# Patient Record
Sex: FEMALE | Race: OTHER | NOT HISPANIC OR LATINO
[De-identification: names, ages, dates, MRNs, and addresses within clinical notes are randomized per-mention and may not be internally consistent; named-entity substitution may affect disease eponyms.]

---

## 2014-05-07 RX ORDER — PANTOPRAZOLE SODIUM 20 MG/1
1 TABLET, DELAYED RELEASE ORAL
Qty: 0 | Refills: 0 | COMMUNITY
Start: 2014-05-07

## 2017-01-09 ENCOUNTER — APPOINTMENT (OUTPATIENT)
Dept: GERIATRICS | Facility: CLINIC | Age: 82
End: 2017-01-09

## 2017-02-06 ENCOUNTER — TRANSCRIPTION ENCOUNTER (OUTPATIENT)
Age: 82
End: 2017-02-06

## 2017-02-10 ENCOUNTER — APPOINTMENT (OUTPATIENT)
Dept: GERIATRICS | Facility: CLINIC | Age: 82
End: 2017-02-10

## 2017-02-21 ENCOUNTER — TRANSCRIPTION ENCOUNTER (OUTPATIENT)
Age: 82
End: 2017-02-21

## 2017-03-13 ENCOUNTER — APPOINTMENT (OUTPATIENT)
Dept: GERIATRICS | Facility: CLINIC | Age: 82
End: 2017-03-13

## 2017-03-13 VITALS
BODY MASS INDEX: 28.91 KG/M2 | DIASTOLIC BLOOD PRESSURE: 60 MMHG | WEIGHT: 134 LBS | OXYGEN SATURATION: 97 % | RESPIRATION RATE: 15 BRPM | HEIGHT: 57 IN | SYSTOLIC BLOOD PRESSURE: 110 MMHG | TEMPERATURE: 97.4 F | HEART RATE: 92 BPM

## 2017-03-13 DIAGNOSIS — R05 COUGH: ICD-10-CM

## 2017-03-20 ENCOUNTER — TRANSCRIPTION ENCOUNTER (OUTPATIENT)
Age: 82
End: 2017-03-20

## 2017-04-04 ENCOUNTER — TRANSCRIPTION ENCOUNTER (OUTPATIENT)
Age: 82
End: 2017-04-04

## 2017-04-04 ENCOUNTER — RX RENEWAL (OUTPATIENT)
Age: 82
End: 2017-04-04

## 2017-05-04 ENCOUNTER — INPATIENT (INPATIENT)
Facility: HOSPITAL | Age: 82
LOS: 3 days | Discharge: HOME | DRG: 698 | End: 2017-05-08
Attending: INTERNAL MEDICINE | Admitting: HOSPITALIST
Payer: MEDICARE

## 2017-05-04 VITALS
OXYGEN SATURATION: 96 % | TEMPERATURE: 100 F | DIASTOLIC BLOOD PRESSURE: 101 MMHG | SYSTOLIC BLOOD PRESSURE: 177 MMHG | HEART RATE: 119 BPM | RESPIRATION RATE: 20 BRPM

## 2017-05-04 DIAGNOSIS — N39.0 URINARY TRACT INFECTION, SITE NOT SPECIFIED: ICD-10-CM

## 2017-05-04 LAB
ALBUMIN SERPL ELPH-MCNC: 3.6 G/DL — SIGNIFICANT CHANGE UP (ref 3.3–5)
ALP SERPL-CCNC: 78 U/L — SIGNIFICANT CHANGE UP (ref 40–120)
ALT FLD-CCNC: 15 U/L RC — SIGNIFICANT CHANGE UP (ref 10–45)
ANION GAP SERPL CALC-SCNC: 16 MMOL/L — SIGNIFICANT CHANGE UP (ref 5–17)
APPEARANCE UR: ABNORMAL
APTT BLD: 34 SEC — SIGNIFICANT CHANGE UP (ref 27.5–37.4)
AST SERPL-CCNC: 17 U/L — SIGNIFICANT CHANGE UP (ref 10–40)
BACTERIA # UR AUTO: ABNORMAL /HPF
BASOPHILS # BLD AUTO: 0.1 K/UL — SIGNIFICANT CHANGE UP (ref 0–0.2)
BASOPHILS NFR BLD AUTO: 0.5 % — SIGNIFICANT CHANGE UP (ref 0–2)
BILIRUB SERPL-MCNC: 0.2 MG/DL — SIGNIFICANT CHANGE UP (ref 0.2–1.2)
BILIRUB UR-MCNC: NEGATIVE — SIGNIFICANT CHANGE UP
BUN SERPL-MCNC: 44 MG/DL — HIGH (ref 7–23)
CALCIUM SERPL-MCNC: 11.2 MG/DL — HIGH (ref 8.4–10.5)
CHLORIDE SERPL-SCNC: 101 MMOL/L — SIGNIFICANT CHANGE UP (ref 96–108)
CO2 SERPL-SCNC: 22 MMOL/L — SIGNIFICANT CHANGE UP (ref 22–31)
COLOR SPEC: YELLOW — SIGNIFICANT CHANGE UP
COMMENT - URINE: SIGNIFICANT CHANGE UP
COMMENT - URINE: SIGNIFICANT CHANGE UP
CREAT SERPL-MCNC: 2.81 MG/DL — HIGH (ref 0.5–1.3)
DIFF PNL FLD: ABNORMAL
EOSINOPHIL # BLD AUTO: 0.2 K/UL — SIGNIFICANT CHANGE UP (ref 0–0.5)
EOSINOPHIL NFR BLD AUTO: 1.7 % — SIGNIFICANT CHANGE UP (ref 0–6)
GAS PNL BLDV: SIGNIFICANT CHANGE UP
GLUCOSE SERPL-MCNC: 108 MG/DL — HIGH (ref 70–99)
GLUCOSE UR QL: NEGATIVE — SIGNIFICANT CHANGE UP
HCT VFR BLD CALC: 32.6 % — LOW (ref 34.5–45)
HGB BLD-MCNC: 10.5 G/DL — LOW (ref 11.5–15.5)
INR BLD: 1.02 RATIO — SIGNIFICANT CHANGE UP (ref 0.88–1.16)
KETONES UR-MCNC: NEGATIVE — SIGNIFICANT CHANGE UP
LEUKOCYTE ESTERASE UR-ACNC: ABNORMAL
LYMPHOCYTES # BLD AUTO: 19.6 % — SIGNIFICANT CHANGE UP (ref 13–44)
LYMPHOCYTES # BLD AUTO: 2.3 K/UL — SIGNIFICANT CHANGE UP (ref 1–3.3)
MCHC RBC-ENTMCNC: 29.3 PG — SIGNIFICANT CHANGE UP (ref 27–34)
MCHC RBC-ENTMCNC: 32.3 GM/DL — SIGNIFICANT CHANGE UP (ref 32–36)
MCV RBC AUTO: 90.5 FL — SIGNIFICANT CHANGE UP (ref 80–100)
MONOCYTES # BLD AUTO: 1.1 K/UL — HIGH (ref 0–0.9)
MONOCYTES NFR BLD AUTO: 9.3 % — SIGNIFICANT CHANGE UP (ref 2–14)
NEUTROPHILS # BLD AUTO: 8 K/UL — HIGH (ref 1.8–7.4)
NEUTROPHILS NFR BLD AUTO: 69 % — SIGNIFICANT CHANGE UP (ref 43–77)
NITRITE UR-MCNC: NEGATIVE — SIGNIFICANT CHANGE UP
PH UR: 6.5 — SIGNIFICANT CHANGE UP (ref 5–8)
PLATELET # BLD AUTO: 298 K/UL — SIGNIFICANT CHANGE UP (ref 150–400)
POTASSIUM SERPL-MCNC: 4.7 MMOL/L — SIGNIFICANT CHANGE UP (ref 3.5–5.3)
POTASSIUM SERPL-SCNC: 4.7 MMOL/L — SIGNIFICANT CHANGE UP (ref 3.5–5.3)
PROT SERPL-MCNC: 8.7 G/DL — HIGH (ref 6–8.3)
PROT UR-MCNC: 30 MG/DL
PROTHROM AB SERPL-ACNC: 11.1 SEC — SIGNIFICANT CHANGE UP (ref 9.8–12.7)
RBC # BLD: 3.6 M/UL — LOW (ref 3.8–5.2)
RBC # FLD: 13.5 % — SIGNIFICANT CHANGE UP (ref 10.3–14.5)
RBC CASTS # UR COMP ASSIST: ABNORMAL /HPF (ref 0–2)
SODIUM SERPL-SCNC: 139 MMOL/L — SIGNIFICANT CHANGE UP (ref 135–145)
SP GR SPEC: 1.01 — LOW (ref 1.01–1.02)
UROBILINOGEN FLD QL: NEGATIVE — SIGNIFICANT CHANGE UP
WBC # BLD: 11.7 K/UL — HIGH (ref 3.8–10.5)
WBC # FLD AUTO: 11.7 K/UL — HIGH (ref 3.8–10.5)
WBC UR QL: >50 /HPF (ref 0–5)

## 2017-05-04 PROCEDURE — 99285 EMERGENCY DEPT VISIT HI MDM: CPT | Mod: GC

## 2017-05-04 PROCEDURE — 74176 CT ABD & PELVIS W/O CONTRAST: CPT | Mod: 26

## 2017-05-04 RX ORDER — ACETAMINOPHEN 500 MG
1000 TABLET ORAL ONCE
Qty: 0 | Refills: 0 | Status: COMPLETED | OUTPATIENT
Start: 2017-05-04 | End: 2017-05-04

## 2017-05-04 RX ORDER — SODIUM CHLORIDE 9 MG/ML
1000 INJECTION INTRAMUSCULAR; INTRAVENOUS; SUBCUTANEOUS ONCE
Qty: 0 | Refills: 0 | Status: COMPLETED | OUTPATIENT
Start: 2017-05-04 | End: 2017-05-04

## 2017-05-04 RX ORDER — CEFTRIAXONE 500 MG/1
1 INJECTION, POWDER, FOR SOLUTION INTRAMUSCULAR; INTRAVENOUS ONCE
Qty: 0 | Refills: 0 | Status: COMPLETED | OUTPATIENT
Start: 2017-05-04 | End: 2017-05-04

## 2017-05-04 RX ORDER — CEFTRIAXONE 500 MG/1
1 INJECTION, POWDER, FOR SOLUTION INTRAMUSCULAR; INTRAVENOUS EVERY 24 HOURS
Qty: 0 | Refills: 0 | Status: DISCONTINUED | OUTPATIENT
Start: 2017-05-05 | End: 2017-05-05

## 2017-05-04 RX ORDER — CEFTRIAXONE 500 MG/1
INJECTION, POWDER, FOR SOLUTION INTRAMUSCULAR; INTRAVENOUS
Qty: 0 | Refills: 0 | Status: DISCONTINUED | OUTPATIENT
Start: 2017-05-04 | End: 2017-05-05

## 2017-05-04 RX ADMIN — SODIUM CHLORIDE 1000 MILLILITER(S): 9 INJECTION INTRAMUSCULAR; INTRAVENOUS; SUBCUTANEOUS at 22:57

## 2017-05-04 RX ADMIN — CEFTRIAXONE 100 GRAM(S): 500 INJECTION, POWDER, FOR SOLUTION INTRAMUSCULAR; INTRAVENOUS at 18:59

## 2017-05-04 RX ADMIN — SODIUM CHLORIDE 1000 MILLILITER(S): 9 INJECTION INTRAMUSCULAR; INTRAVENOUS; SUBCUTANEOUS at 20:12

## 2017-05-04 RX ADMIN — Medication 400 MILLIGRAM(S): at 22:58

## 2017-05-04 RX ADMIN — SODIUM CHLORIDE 1000 MILLILITER(S): 9 INJECTION INTRAMUSCULAR; INTRAVENOUS; SUBCUTANEOUS at 17:20

## 2017-05-04 NOTE — ED PROVIDER NOTE - OBJECTIVE STATEMENT
Tequila Smith, DO: 82F with hx of Dementia, CKD w/ 1 functional kidney, neurogenic bladder requiring self-catheterization & HTN here for dysuria & urinary frequency w/ hematuria. Pt AAO x 3.  endorses pt w/ chills - pt denies. No nausea/vomiting, dizziness, back pain, no change in vision, neck stiffness, no sore throat, no chest pain, no SOB, no cough, no abdominal pain, no diarrhea, no joint pain, no rashes, no focal neurologic complaints, otherwise as HPI.  PMD: Dr. Karina Wilson (Clinic)  Uro: Dr. Romel Pinedo

## 2017-05-04 NOTE — ED ADULT NURSE NOTE - OBJECTIVE STATEMENT
pt via ems with urinary retention no urine since last night hx neurogenic bladder with self cath per pt and  pt tachycardic with oral temp 99.1 on arrival skin warm dry abd large unreducable abd hernia pt denies chest pain orsob  abd discomfort on plapation

## 2017-05-04 NOTE — ED PROVIDER NOTE - PHYSICAL EXAMINATION
Tequila Smith, DO: PE: CONSTITUTIONAL: Well appearing, well nourished, in no apparent distress. ENMT: Airway patent, nasal mucosa clear, mouth with normal mucosa. HEAD: NCAT EYES: PERRL, EOMI CARDIAC: RRR, no m/r/g, no pedal edema RESPIRATORY: CTA b/l, no adventitious sounds GI: Abdomen non-distended, abdominal hernia TTP, abd otherwise TTP, no CVAT MSK: Spine appears normal, range of motion is not limited, no muscle/joint tenderness NEURO: CNII-XII grossly intact, 5/5 strength, full sensation all extremities SKIN: No rash

## 2017-05-04 NOTE — ED PROVIDER NOTE - MEDICAL DECISION MAKING DETAILS
82 y F congenital solitary kidney, unknown baseline Cr, self cath x 4-5 years for neurogenic bladder c decraesed UOP and suprapubic pain/tenderness c fevers at home (subjective).  Tachycardic on intial exam.  T 99.7 PO.  Concern for complicated UTI likely requiring admission.  UA/UCx, fluid bolus, basic labs, vbg, CT A/P to eval for obstruction, abx after cx, reassess.  --BMM

## 2017-05-04 NOTE — ED ADULT NURSE REASSESSMENT NOTE - NS ED NURSE REASSESS COMMENT FT1
Report received from BARRIE Jones. Pt. A+OX3, sinus tachycardia on CM. New IV placed, IVF being administered as ordered. Chris draining clear, yellow urine. Comfort and safety maintained. Pending dispo.

## 2017-05-04 NOTE — ED PROVIDER NOTE - CARE PLAN
Principal Discharge DX:	UTI (urinary tract infection)  Secondary Diagnosis:	Neurogenic bladder disorder

## 2017-05-04 NOTE — ED ADULT NURSE REASSESSMENT NOTE - NS ED NURSE REASSESS COMMENT FT1
pt upon return from ct scan iv site left a/c soft swelling iv removed from area to be ressited for fluid s

## 2017-05-04 NOTE — ED PROVIDER NOTE - PROGRESS NOTE DETAILS
Tequila Smith DO: Hospitalist paged. Tequila Smith, DO: Son now at bedside. States 2/2 Dementia, mother has been washing & reusing self-catheters because she believes someone told her in hospital she could. Son has been throwing out reused catheters.

## 2017-05-05 ENCOUNTER — TRANSCRIPTION ENCOUNTER (OUTPATIENT)
Age: 82
End: 2017-05-05

## 2017-05-05 ENCOUNTER — APPOINTMENT (OUTPATIENT)
Dept: GERIATRICS | Facility: CLINIC | Age: 82
End: 2017-05-05

## 2017-05-05 DIAGNOSIS — Z29.9 ENCOUNTER FOR PROPHYLACTIC MEASURES, UNSPECIFIED: ICD-10-CM

## 2017-05-05 DIAGNOSIS — F03.90 UNSPECIFIED DEMENTIA, UNSPECIFIED SEVERITY, WITHOUT BEHAVIORAL DISTURBANCE, PSYCHOTIC DISTURBANCE, MOOD DISTURBANCE, AND ANXIETY: ICD-10-CM

## 2017-05-05 DIAGNOSIS — E83.52 HYPERCALCEMIA: ICD-10-CM

## 2017-05-05 DIAGNOSIS — N31.9 NEUROMUSCULAR DYSFUNCTION OF BLADDER, UNSPECIFIED: ICD-10-CM

## 2017-05-05 DIAGNOSIS — N18.9 CHRONIC KIDNEY DISEASE, UNSPECIFIED: ICD-10-CM

## 2017-05-05 DIAGNOSIS — N39.0 URINARY TRACT INFECTION, SITE NOT SPECIFIED: ICD-10-CM

## 2017-05-05 DIAGNOSIS — I10 ESSENTIAL (PRIMARY) HYPERTENSION: ICD-10-CM

## 2017-05-05 DIAGNOSIS — N17.9 ACUTE KIDNEY FAILURE, UNSPECIFIED: ICD-10-CM

## 2017-05-05 LAB
24R-OH-CALCIDIOL SERPL-MCNC: 64.4 NG/ML — SIGNIFICANT CHANGE UP (ref 30–100)
CALCIUM SERPL-MCNC: 8.9 MG/DL — SIGNIFICANT CHANGE UP (ref 8.4–10.5)
PTH-INTACT FLD-MCNC: 32 PG/ML — SIGNIFICANT CHANGE UP (ref 15–65)
VIT D25+D1,25 OH+D1,25 PNL SERPL-MCNC: 44.3 PG/ML — SIGNIFICANT CHANGE UP (ref 19.9–79.3)

## 2017-05-05 PROCEDURE — 93010 ELECTROCARDIOGRAM REPORT: CPT

## 2017-05-05 PROCEDURE — 99223 1ST HOSP IP/OBS HIGH 75: CPT | Mod: GC

## 2017-05-05 PROCEDURE — 99222 1ST HOSP IP/OBS MODERATE 55: CPT

## 2017-05-05 RX ORDER — SODIUM CHLORIDE 9 MG/ML
1000 INJECTION INTRAMUSCULAR; INTRAVENOUS; SUBCUTANEOUS
Qty: 0 | Refills: 0 | Status: DISCONTINUED | OUTPATIENT
Start: 2017-05-05 | End: 2017-05-06

## 2017-05-05 RX ORDER — CEFTRIAXONE 500 MG/1
1 INJECTION, POWDER, FOR SOLUTION INTRAMUSCULAR; INTRAVENOUS EVERY 24 HOURS
Qty: 0 | Refills: 0 | Status: DISCONTINUED | OUTPATIENT
Start: 2017-05-05 | End: 2017-05-07

## 2017-05-05 RX ORDER — PANTOPRAZOLE SODIUM 20 MG/1
40 TABLET, DELAYED RELEASE ORAL
Qty: 0 | Refills: 0 | Status: DISCONTINUED | OUTPATIENT
Start: 2017-05-05 | End: 2017-05-08

## 2017-05-05 RX ORDER — MEROPENEM 1 G/30ML
500 INJECTION INTRAVENOUS EVERY 12 HOURS
Qty: 0 | Refills: 0 | Status: DISCONTINUED | OUTPATIENT
Start: 2017-05-05 | End: 2017-05-05

## 2017-05-05 RX ORDER — MIRTAZAPINE 45 MG/1
15 TABLET, ORALLY DISINTEGRATING ORAL AT BEDTIME
Qty: 0 | Refills: 0 | Status: DISCONTINUED | OUTPATIENT
Start: 2017-05-05 | End: 2017-05-08

## 2017-05-05 RX ORDER — SIMETHICONE 80 MG/1
80 TABLET, CHEWABLE ORAL
Qty: 0 | Refills: 0 | Status: DISCONTINUED | OUTPATIENT
Start: 2017-05-05 | End: 2017-05-08

## 2017-05-05 RX ORDER — METOPROLOL TARTRATE 50 MG
50 TABLET ORAL
Qty: 0 | Refills: 0 | Status: DISCONTINUED | OUTPATIENT
Start: 2017-05-05 | End: 2017-05-08

## 2017-05-05 RX ORDER — SIMVASTATIN 20 MG/1
20 TABLET, FILM COATED ORAL AT BEDTIME
Qty: 0 | Refills: 0 | Status: DISCONTINUED | OUTPATIENT
Start: 2017-05-05 | End: 2017-05-08

## 2017-05-05 RX ORDER — HEPARIN SODIUM 5000 [USP'U]/ML
5000 INJECTION INTRAVENOUS; SUBCUTANEOUS EVERY 12 HOURS
Qty: 0 | Refills: 0 | Status: DISCONTINUED | OUTPATIENT
Start: 2017-05-05 | End: 2017-05-05

## 2017-05-05 RX ORDER — FERROUS SULFATE 325(65) MG
325 TABLET ORAL DAILY
Qty: 0 | Refills: 0 | Status: DISCONTINUED | OUTPATIENT
Start: 2017-05-05 | End: 2017-05-08

## 2017-05-05 RX ORDER — SODIUM CHLORIDE 9 MG/ML
1000 INJECTION INTRAMUSCULAR; INTRAVENOUS; SUBCUTANEOUS
Qty: 0 | Refills: 0 | Status: DISCONTINUED | OUTPATIENT
Start: 2017-05-05 | End: 2017-05-05

## 2017-05-05 RX ORDER — HEPARIN SODIUM 5000 [USP'U]/ML
5000 INJECTION INTRAVENOUS; SUBCUTANEOUS EVERY 8 HOURS
Qty: 0 | Refills: 0 | Status: DISCONTINUED | OUTPATIENT
Start: 2017-05-05 | End: 2017-05-08

## 2017-05-05 RX ADMIN — SODIUM CHLORIDE 100 MILLILITER(S): 9 INJECTION INTRAMUSCULAR; INTRAVENOUS; SUBCUTANEOUS at 02:01

## 2017-05-05 RX ADMIN — SODIUM CHLORIDE 60 MILLILITER(S): 9 INJECTION INTRAMUSCULAR; INTRAVENOUS; SUBCUTANEOUS at 22:34

## 2017-05-05 RX ADMIN — SIMVASTATIN 20 MILLIGRAM(S): 20 TABLET, FILM COATED ORAL at 22:36

## 2017-05-05 RX ADMIN — Medication 1000 MILLIGRAM(S): at 00:52

## 2017-05-05 RX ADMIN — MIRTAZAPINE 15 MILLIGRAM(S): 45 TABLET, ORALLY DISINTEGRATING ORAL at 22:35

## 2017-05-05 RX ADMIN — Medication 325 MILLIGRAM(S): at 11:54

## 2017-05-05 RX ADMIN — HEPARIN SODIUM 5000 UNIT(S): 5000 INJECTION INTRAVENOUS; SUBCUTANEOUS at 06:47

## 2017-05-05 RX ADMIN — CEFTRIAXONE 100 GRAM(S): 500 INJECTION, POWDER, FOR SOLUTION INTRAMUSCULAR; INTRAVENOUS at 10:39

## 2017-05-05 RX ADMIN — Medication 50 MILLIGRAM(S): at 06:43

## 2017-05-05 RX ADMIN — SODIUM CHLORIDE 100 MILLILITER(S): 9 INJECTION INTRAMUSCULAR; INTRAVENOUS; SUBCUTANEOUS at 10:55

## 2017-05-05 RX ADMIN — SODIUM CHLORIDE 60 MILLILITER(S): 9 INJECTION INTRAMUSCULAR; INTRAVENOUS; SUBCUTANEOUS at 16:09

## 2017-05-05 RX ADMIN — Medication 50 MILLIGRAM(S): at 17:20

## 2017-05-05 RX ADMIN — HEPARIN SODIUM 5000 UNIT(S): 5000 INJECTION INTRAVENOUS; SUBCUTANEOUS at 22:35

## 2017-05-05 RX ADMIN — PANTOPRAZOLE SODIUM 40 MILLIGRAM(S): 20 TABLET, DELAYED RELEASE ORAL at 06:43

## 2017-05-05 NOTE — DISCHARGE NOTE ADULT - HOME CARE AGENCY
A nurse will visit you in your home from [facility].  They will contact you to confirm their visit.  Call [phone] or [phone] with any questions.

## 2017-05-05 NOTE — H&P ADULT. - PROBLEM SELECTOR PLAN 1
has increased urinary freq as symptom with tachycardia, leukocytosis  has h/o ESBL ecoli on chart but no documented cultures  will treat with meropenem for now and de-escalate pending Cx results  start  cc/hr, no signs of volume overload

## 2017-05-05 NOTE — H&P ADULT. - PROBLEM SELECTOR PLAN 3
slightly elevated  cont toprol xl 50 bid slightly elevated  cont Toprol xl 50 bid may have NELL on top of CKD as baseline 1.8-2  trend, avoid nephrotoxins, monitor UOP  IV Fluids

## 2017-05-05 NOTE — DISCHARGE NOTE ADULT - ADDITIONAL INSTRUCTIONS
Make appointments to follow up with your out patient physicians.  Bring all discharge paperwork including discharge medication list to your follow up appointments. Make appointments to follow up with your out patient physicians.  Bring all discharge paperwork including discharge medication list to your follow up appointments.  Follow-up with ID clinic in 2-3 weeks --call for appointment [phone]

## 2017-05-05 NOTE — DISCHARGE NOTE ADULT - PLAN OF CARE
Remain without infection HOME CARE INSTRUCTIONS  If you were prescribed antibiotics, take them exactly as your caregiver instructs you. Finish the medication even if you feel better after you have only taken some of the medication.  Drink enough water and fluids to keep your urine clear or pale yellow.  Avoid caffeine, tea, and carbonated beverages. They tend to irritate your bladder.  Empty your bladder often. Avoid holding urine for long periods of time.  Empty your bladder before and after sexual intercourse.  After a bowel movement, women should cleanse from front to back. Use each tissue only once.  SEEK MEDICAL CARE IF:  You have back pain.  You develop a fever.  Your symptoms do not begin to resolve within 3 days.  SEEK IMMEDIATE MEDICAL CARE IF:  You have severe back pain or lower abdominal pain.  You develop chills.  You have nausea or vomiting.  You have continued burning or discomfort with urination. Continue current medications Low salt diet  Activity as tolerated.  Take all medication as prescribed.  Follow up with your medical doctor for routine blood pressure monitoring at your next visit.  Notify your doctor if you have any of the following symptoms:   Dizziness, Lightheadedness, Blurry vision, Headache, Chest pain, Shortness of breath Avoid taking (NSAIDs) - (ex: Ibuprofen, Advil, Celebrex, Naprosyn)  Avoid taking any nephrotoxic agents (can harm kidneys) - Intravenous contrast for diagnostic testing, combination cold medications.  Have all medications adjusted for your renal function by your Health Care Provider.  Blood pressure control is important.  Take all medication as prescribed. chronic knee infection--continue antibiotic as prescribed follow-up with your PCP for Orthopedic consult/follow-up d/c with Chris catheter Continue current medications  home care arranged follow-up with your PCP for Orthopedic consult/follow-up  wound care right knee: dry dressing daily/prn

## 2017-05-05 NOTE — DISCHARGE NOTE ADULT - PATIENT PORTAL LINK FT
“You can access the [employer] Patient Portal, offered by [facility], by registering with the following website: [email or web address]”

## 2017-05-05 NOTE — H&P ADULT. - PROBLEM SELECTOR PLAN 4
near baseline  may have NELL on top of CKD  trend, avoid nephrotoxins, monitor UOP  likely improve with fluids IV Fluids as above  check PTH, vit d  trend Ca

## 2017-05-05 NOTE — DISCHARGE NOTE ADULT - PROVIDER TOKENS
TOKEN:'[id number]',TOKEN:'[id number]' TOKEN:'[id number]'

## 2017-05-05 NOTE — DISCHARGE NOTE ADULT - SECONDARY DIAGNOSIS.
Neurogenic bladder disorder HTN (hypertension) Acute on chronic kidney failure Effusion of right knee

## 2017-05-05 NOTE — H&P ADULT. - ATTENDING COMMENTS
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 Patient states that in the past 2-3 days she has felt more tired and felt that she has urinated more frequently than her baseline line. Denies abdominal pain, denies dysuria, flank pain. Patient states that when she self caths, but denies reusing her catheters. Per ED charting, son was concerned that pt was reusing her catherters. Pt able to provide much of the history, but with underlying dementia, accuracy of HPI in question.  Of note, Patient defers use of  phone. Team called at provided contact info yet unable to make contact with family.  Labs, imaging reviewed. EKG ordered and pending.   UTI (urinary tract infection) concern for sepsis as patient with leukocytosis and tachycardia on exam. No appreciable hypotension. -Maintenance IV fluid -?h/o ESBL E.coli on chart yet no documented cultures, treat with meropenem for now F/U speciation and sensitivities of cultures. continue with roque. Primary day team to follow up with family in AM with further collateral information.  Remainder of plan as noted above.  Primary day team to assume care in AM

## 2017-05-05 NOTE — H&P ADULT. - RS GEN PE MLT RESP DETAILS PC
respirations non-labored/no intercostal retractions/breath sounds equal/no rhonchi/airway patent/no chest wall tenderness/good air movement/no wheezes/no rales/clear to auscultation bilaterally

## 2017-05-05 NOTE — DISCHARGE NOTE ADULT - CARE PROVIDERS DIRECT ADDRESSES
,[email or web address]_Unknown ,[email or web address],[email or web address]_Unknown,[email or web address]

## 2017-05-05 NOTE — DISCHARGE NOTE ADULT - MEDICATION SUMMARY - MEDICATIONS TO TAKE
I will START or STAY ON the medications listed below when I get home from the hospital:    Remeron 15 mg oral tablet  -- 1 tab(s) by mouth once a day (at bedtime)  -- Indication: For Dementia    simvastatin 20 mg oral tablet  -- 1 tab(s) by mouth once a day (at bedtime)  -- Indication: For HLD    metoprolol succinate 50 mg oral tablet, extended release  -- 1 tab(s) by mouth 2 times a day  -- Indication: For HTN (hypertension)    ferrous sulfate  -- 325 milligram(s) by mouth once a day  -- Indication: For supplement    simethicone 80 mg oral tablet, chewable  -- 1 tab(s) by mouth 4 times a day, As needed, bloating  -- Indication: For Dyspepsia    amoxicillin-clavulanate 500 mg-125 mg oral tablet  -- 1 tab(s) by mouth 2 times a day  -- Indication: For Urinary tract infection    pantoprazole 40 mg oral delayed release tablet  -- 1 tab(s) by mouth 2 times a day (before meals)  -- Indication: For Dyspepsia I will START or STAY ON the medications listed below when I get home from the hospital:    Remeron 15 mg oral tablet  -- 1 tab(s) by mouth once a day (at bedtime)  -- Indication: For Dementia    simvastatin 20 mg oral tablet  -- 1 tab(s) by mouth once a day (at bedtime)  -- Indication: For HLD    metoprolol succinate 50 mg oral tablet, extended release  -- 1 tab(s) by mouth 2 times a day  -- Indication: For HTN (hypertension)    Keflex 500 mg oral capsule  -- 1 cap(s) by mouth every 8 hours  -- Finish all this medication unless otherwise directed by prescriber.    -- Indication: For knee infection     ferrous sulfate  -- 325 milligram(s) by mouth once a day  -- Indication: For supplement    simethicone 80 mg oral tablet, chewable  -- 1 tab(s) by mouth 4 times a day, As needed, bloating  -- Indication: For Dyspepsia    pantoprazole 40 mg oral delayed release tablet  -- 1 tab(s) by mouth 2 times a day (before meals)  -- Indication: For Dyspepsia

## 2017-05-05 NOTE — H&P ADULT. - LAB RESULTS AND INTERPRETATION
Labs reivewed by me  UA positive, Cr 2.81, ca 11.2, wbc 11.7, rbc 10.5 Labs reviewed by me  UA positive, Cr 2.81, ca 11.2, wbc 11.7 (leukocytosis), rbc 10.5

## 2017-05-05 NOTE — DISCHARGE NOTE ADULT - CARE PROVIDER_API CALL
Karina Wilson), Geriatric Medicine; Internal Medicine  [address] [place] [address]  Phone: [phone]  Fax: [phone]    Romel Schilling (), Nephrology  [address] [place] [address]  Phone: [phone]  Fax: [phone] Karina Wilson), Geriatric Medicine; Internal Medicine  [address] [place] [address]  Phone: [phone]  Fax: [phone]    Romel Schilling (), Nephrology  [address] [address]  Phone: [phone]  Fax: [phone]    Matheus Woo), Infectious Disease; Internal Medicine  [address] [place] [address]  Phone: [phone]  Fax: [phone]

## 2017-05-05 NOTE — H&P ADULT. - NEGATIVE GENERAL GENITOURINARY SYMPTOMS
no flank pain L/no flank pain R/no renal colic no flank pain R/no renal colic/no flank pain L/no dysuria

## 2017-05-05 NOTE — DISCHARGE NOTE ADULT - CARE PLAN
Principal Discharge DX:	UTI (urinary tract infection)  Goal:	Remain without infection  Instructions for follow-up, activity and diet:	HOME CARE INSTRUCTIONS  If you were prescribed antibiotics, take them exactly as your caregiver instructs you. Finish the medication even if you feel better after you have only taken some of the medication.  Drink enough water and fluids to keep your urine clear or pale yellow.  Avoid caffeine, tea, and carbonated beverages. They tend to irritate your bladder.  Empty your bladder often. Avoid holding urine for long periods of time.  Empty your bladder before and after sexual intercourse.  After a bowel movement, women should cleanse from front to back. Use each tissue only once.  SEEK MEDICAL CARE IF:  You have back pain.  You develop a fever.  Your symptoms do not begin to resolve within 3 days.  SEEK IMMEDIATE MEDICAL CARE IF:  You have severe back pain or lower abdominal pain.  You develop chills.  You have nausea or vomiting.  You have continued burning or discomfort with urination.  Secondary Diagnosis:	Neurogenic bladder disorder  Instructions for follow-up, activity and diet:	Continue current medications  Secondary Diagnosis:	HTN (hypertension)  Instructions for follow-up, activity and diet:	Low salt diet  Activity as tolerated.  Take all medication as prescribed.  Follow up with your medical doctor for routine blood pressure monitoring at your next visit.  Notify your doctor if you have any of the following symptoms:   Dizziness, Lightheadedness, Blurry vision, Headache, Chest pain, Shortness of breath  Secondary Diagnosis:	Acute on chronic kidney failure  Instructions for follow-up, activity and diet:	Avoid taking (NSAIDs) - (ex: Ibuprofen, Advil, Celebrex, Naprosyn)  Avoid taking any nephrotoxic agents (can harm kidneys) - Intravenous contrast for diagnostic testing, combination cold medications.  Have all medications adjusted for your renal function by your Health Care Provider.  Blood pressure control is important.  Take all medication as prescribed. Principal Discharge DX:	UTI (urinary tract infection)  Goal:	Remain without infection  Instructions for follow-up, activity and diet:	HOME CARE INSTRUCTIONS  If you were prescribed antibiotics, take them exactly as your caregiver instructs you. Finish the medication even if you feel better after you have only taken some of the medication.  Drink enough water and fluids to keep your urine clear or pale yellow.  Avoid caffeine, tea, and carbonated beverages. They tend to irritate your bladder.  Empty your bladder often. Avoid holding urine for long periods of time.  Empty your bladder before and after sexual intercourse.  After a bowel movement, women should cleanse from front to back. Use each tissue only once.  SEEK MEDICAL CARE IF:  You have back pain.  You develop a fever.  Your symptoms do not begin to resolve within 3 days.  SEEK IMMEDIATE MEDICAL CARE IF:  You have severe back pain or lower abdominal pain.  You develop chills.  You have nausea or vomiting.  You have continued burning or discomfort with urination.  Secondary Diagnosis:	Neurogenic bladder disorder  Instructions for follow-up, activity and diet:	Continue current medications  Secondary Diagnosis:	HTN (hypertension)  Instructions for follow-up, activity and diet:	Low salt diet  Activity as tolerated.  Take all medication as prescribed.  Follow up with your medical doctor for routine blood pressure monitoring at your next visit.  Notify your doctor if you have any of the following symptoms:   Dizziness, Lightheadedness, Blurry vision, Headache, Chest pain, Shortness of breath  Secondary Diagnosis:	Acute on chronic kidney failure  Instructions for follow-up, activity and diet:	Avoid taking (NSAIDs) - (ex: Ibuprofen, Advil, Celebrex, Naprosyn)  Avoid taking any nephrotoxic agents (can harm kidneys) - Intravenous contrast for diagnostic testing, combination cold medications.  Have all medications adjusted for your renal function by your Health Care Provider.  Blood pressure control is important.  Take all medication as prescribed.  Secondary Diagnosis:	Effusion of right knee  Goal:	chronic knee infection--continue antibiotic as prescribed  Instructions for follow-up, activity and diet:	follow-up with your PCP for Orthopedic consult/follow-up Principal Discharge DX:	UTI (urinary tract infection)  Goal:	Remain without infection  Instructions for follow-up, activity and diet:	HOME CARE INSTRUCTIONS  If you were prescribed antibiotics, take them exactly as your caregiver instructs you. Finish the medication even if you feel better after you have only taken some of the medication.  Drink enough water and fluids to keep your urine clear or pale yellow.  Avoid caffeine, tea, and carbonated beverages. They tend to irritate your bladder.  Empty your bladder often. Avoid holding urine for long periods of time.  Empty your bladder before and after sexual intercourse.  After a bowel movement, women should cleanse from front to back. Use each tissue only once.  SEEK MEDICAL CARE IF:  You have back pain.  You develop a fever.  Your symptoms do not begin to resolve within 3 days.  SEEK IMMEDIATE MEDICAL CARE IF:  You have severe back pain or lower abdominal pain.  You develop chills.  You have nausea or vomiting.  You have continued burning or discomfort with urination.  Secondary Diagnosis:	Neurogenic bladder disorder  Goal:	d/c with Chris catheter  Instructions for follow-up, activity and diet:	Continue current medications  home care arranged  Secondary Diagnosis:	HTN (hypertension)  Instructions for follow-up, activity and diet:	Low salt diet  Activity as tolerated.  Take all medication as prescribed.  Follow up with your medical doctor for routine blood pressure monitoring at your next visit.  Notify your doctor if you have any of the following symptoms:   Dizziness, Lightheadedness, Blurry vision, Headache, Chest pain, Shortness of breath  Secondary Diagnosis:	Acute on chronic kidney failure  Instructions for follow-up, activity and diet:	Avoid taking (NSAIDs) - (ex: Ibuprofen, Advil, Celebrex, Naprosyn)  Avoid taking any nephrotoxic agents (can harm kidneys) - Intravenous contrast for diagnostic testing, combination cold medications.  Have all medications adjusted for your renal function by your Health Care Provider.  Blood pressure control is important.  Take all medication as prescribed.  Secondary Diagnosis:	Effusion of right knee  Goal:	chronic knee infection--continue antibiotic as prescribed  Instructions for follow-up, activity and diet:	follow-up with your PCP for Orthopedic consult/follow-up  wound care right knee: dry dressing daily/prn

## 2017-05-05 NOTE — DISCHARGE NOTE ADULT - HOSPITAL COURSE
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 Pt able to provide much of the history but appears confused at times. Unable to reach family via phone. She states she has been feeling bladder pressure, urge to urinate more frequency for the past few days. Usually self caths once in am and once qhs. Denies dysuria, fever, chills, abd pain, flank pain, CP, SOB, diarrhea. Believes she takes an antibiotics from her urologist (6 weeks on, 6 weeks off) but unclear which antibiotic. Has documented ESBL ecoli uti in her chart but we do not have records of her culture. Per ED notes, appears son was concerned that pt was reusing her catherters. In the ED vitals were T 99.7, , /101, RR 20, O2 96 RA. Received 3 L NS bolus, 1g ceftriaxone.    Patient was initially started on IVF (maintenance) and Meropenem for concern of ESBL on urine culture from 2009. ID was consulted and recommended to switch Meropenem to Ceftriaxone via IV. Dr. Romel Pinedo (nephrology) was notified about her admission to Hospital. 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 Pt able to provide much of the history but appears confused at times Usually self caths once in am and once qhs. Denies dysuria, fever, chills, abd pain, flank pain, CP, SOB, diarrhea. Believes she takes an antibiotics from her urologist (6 weeks on, 6 weeks off). Patient was initially started on IVF (maintenance) and Meropenem for concern of ESBL on urine culture from 2009. ID was consulted and recommended to switch Meropenem to Ceftriaxone via IV. Patient also was evaluated by her nephrologist Dr. Romel Pinedo and we found that due to cognitive impairment, patient is not able to self catheterize anymore . After discussion with nephrologist and family roque catheter has been inserted and patient will be discharged with roque catheter. Urine culture showed Ecoli and IV antibiotic was changed to Augmentin for total course of 7 days. As per discussion with family members will not do further evaluation for chronic knee infection and patient will f/u with her own orthopedic surgeon. Plan and changes was discussed with PCP. 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 Pt able to provide much of the history but appears confused at times Usually self caths once in am and once qhs. Denies dysuria, fever, chills, abd pain, flank pain, CP, SOB, diarrhea. Believes she takes an antibiotics from her urologist (6 weeks on, 6 weeks off). Patient was initially started on IVF (maintenance) and Meropenem for concern of ESBL on urine culture from 2009. ID was consulted and recommended to switch Meropenem to Ceftriaxone via IV. Patient also was evaluated by her nephrologist Dr. Romel Pinedo and we found that due to cognitive impairment, patient is not able to self catheterize anymore . After discussion with nephrologist and family roque catheter has been inserted and patient will be discharged with roque catheter. Urine culture showed Ecoli and IV antibiotic was changed to Keflex for 7 more days. As per discussion with family members and ID will not do further evaluation for chronic knee infection and patient will f/u with ID and orthopedic surgeon. Plan and changes was discussed with PCP.

## 2017-05-05 NOTE — ED ADULT NURSE REASSESSMENT NOTE - NS ED NURSE REASSESS COMMENT FT1
Medicine MD at bedside evaluating patient. Chris bag noted to be draining and leaking from bag clear yellow urine. Chris bag changed, draining well.

## 2017-05-05 NOTE — H&P ADULT. - RADIOLOGY RESULTS AND INTERPRETATION
Radiology reviewed by me  CT w/o hydro, trabeculated bladder, renal atrophy Radiology reviewed:  CT abdomen/pelvis:   IMPRESSION:   1.  No hydronephrosis or nephrolithiasis.  2.  Thickened and mildly trabeculated bladder with indwelling Chris   catheter.  3.  Renal atrophy.

## 2017-05-05 NOTE — H&P ADULT. - HISTORY OF PRESENT ILLNESS
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 Pt able to provide much of the history but appears confused at times. Unable to reach family via phone. She states she has been feeling bladder pressure, urge to urinate more freq for the past few days. Usually self caths once in am and once qhs. Denies dysuria, fever, chills, abd pain, flank pain, CP, SOB, diarrhea. Believes she takes an antibiotics from her urologist (6 weeks on, 6 weeks off) but unclear which antibiotic. Has documented ESBL ecoli uti in her chart but we do not have records of her culture.     In the ED vitals were T 99.7, , /101, RR 20, O2 96 RA. Recieved 3 L NS bolus, 1g ceftriaxone. 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 Pt able to provide much of the history but appears confused at times. Unable to reach family via phone. She states she has been feeling bladder pressure, urge to urinate more freq for the past few days. Usually self caths once in am and once qhs. Denies dysuria, fever, chills, abd pain, flank pain, CP, SOB, diarrhea. Believes she takes an antibiotics from her urologist (6 weeks on, 6 weeks off) but unclear which antibiotic. Has documented ESBL ecoli uti in her chart but we do not have records of her culture. Per ED notes, appears son was concerned that pt was reusing her catherters.    In the ED vitals were T 99.7, , /101, RR 20, O2 96 RA. Recieved 3 L NS bolus, 1g ceftriaxone.

## 2017-05-06 LAB
-  AMIKACIN: SIGNIFICANT CHANGE UP
-  AMPICILLIN/SULBACTAM: SIGNIFICANT CHANGE UP
-  AMPICILLIN: SIGNIFICANT CHANGE UP
-  AZTREONAM: SIGNIFICANT CHANGE UP
-  CEFAZOLIN: SIGNIFICANT CHANGE UP
-  CEFEPIME: SIGNIFICANT CHANGE UP
-  CEFOXITIN: SIGNIFICANT CHANGE UP
-  CEFTAZIDIME: SIGNIFICANT CHANGE UP
-  CEFTRIAXONE: SIGNIFICANT CHANGE UP
-  CIPROFLOXACIN: SIGNIFICANT CHANGE UP
-  ERTAPENEM: SIGNIFICANT CHANGE UP
-  GENTAMICIN: SIGNIFICANT CHANGE UP
-  IMIPENEM: SIGNIFICANT CHANGE UP
-  LEVOFLOXACIN: SIGNIFICANT CHANGE UP
-  MEROPENEM: SIGNIFICANT CHANGE UP
-  NITROFURANTOIN: SIGNIFICANT CHANGE UP
-  PIPERACILLIN/TAZOBACTAM: SIGNIFICANT CHANGE UP
-  TOBRAMYCIN: SIGNIFICANT CHANGE UP
-  TRIMETHOPRIM/SULFAMETHOXAZOLE: SIGNIFICANT CHANGE UP
ANION GAP SERPL CALC-SCNC: 15 MMOL/L — SIGNIFICANT CHANGE UP (ref 5–17)
BUN SERPL-MCNC: 27 MG/DL — HIGH (ref 7–23)
CALCIUM SERPL-MCNC: 9.7 MG/DL — SIGNIFICANT CHANGE UP (ref 8.4–10.5)
CHLORIDE SERPL-SCNC: 109 MMOL/L — HIGH (ref 96–108)
CO2 SERPL-SCNC: 17 MMOL/L — LOW (ref 22–31)
CREAT SERPL-MCNC: 2.28 MG/DL — HIGH (ref 0.5–1.3)
CULTURE RESULTS: SIGNIFICANT CHANGE UP
GLUCOSE SERPL-MCNC: 101 MG/DL — HIGH (ref 70–99)
HCT VFR BLD CALC: 31 % — LOW (ref 34.5–45)
HGB BLD-MCNC: 9.5 G/DL — LOW (ref 11.5–15.5)
MAGNESIUM SERPL-MCNC: 1.9 MG/DL — SIGNIFICANT CHANGE UP (ref 1.6–2.6)
MCHC RBC-ENTMCNC: 28.4 PG — SIGNIFICANT CHANGE UP (ref 27–34)
MCHC RBC-ENTMCNC: 30.6 GM/DL — LOW (ref 32–36)
MCV RBC AUTO: 92.8 FL — SIGNIFICANT CHANGE UP (ref 80–100)
METHOD TYPE: SIGNIFICANT CHANGE UP
ORGANISM # SPEC MICROSCOPIC CNT: SIGNIFICANT CHANGE UP
ORGANISM # SPEC MICROSCOPIC CNT: SIGNIFICANT CHANGE UP
PHOSPHATE SERPL-MCNC: 3 MG/DL — SIGNIFICANT CHANGE UP (ref 2.5–4.5)
PLATELET # BLD AUTO: 320 K/UL — SIGNIFICANT CHANGE UP (ref 150–400)
POTASSIUM SERPL-MCNC: 4.5 MMOL/L — SIGNIFICANT CHANGE UP (ref 3.5–5.3)
POTASSIUM SERPL-SCNC: 4.5 MMOL/L — SIGNIFICANT CHANGE UP (ref 3.5–5.3)
PROT SERPL-MCNC: 6.8 G/DL — SIGNIFICANT CHANGE UP (ref 6–8.3)
PROT SERPL-MCNC: 6.8 G/DL — SIGNIFICANT CHANGE UP (ref 6–8.3)
RBC # BLD: 3.34 M/UL — LOW (ref 3.8–5.2)
RBC # FLD: 15 % — HIGH (ref 10.3–14.5)
SODIUM SERPL-SCNC: 141 MMOL/L — SIGNIFICANT CHANGE UP (ref 135–145)
SPECIMEN SOURCE: SIGNIFICANT CHANGE UP
WBC # BLD: 8.63 K/UL — SIGNIFICANT CHANGE UP (ref 3.8–10.5)
WBC # FLD AUTO: 8.63 K/UL — SIGNIFICANT CHANGE UP (ref 3.8–10.5)

## 2017-05-06 PROCEDURE — 99232 SBSQ HOSP IP/OBS MODERATE 35: CPT

## 2017-05-06 PROCEDURE — 99233 SBSQ HOSP IP/OBS HIGH 50: CPT

## 2017-05-06 RX ORDER — ERYTHROPOIETIN 10000 [IU]/ML
10000 INJECTION, SOLUTION INTRAVENOUS; SUBCUTANEOUS ONCE
Qty: 0 | Refills: 0 | Status: COMPLETED | OUTPATIENT
Start: 2017-05-06 | End: 2017-05-06

## 2017-05-06 RX ORDER — HYDRALAZINE HCL 50 MG
10 TABLET ORAL ONCE
Qty: 0 | Refills: 0 | Status: COMPLETED | OUTPATIENT
Start: 2017-05-06 | End: 2017-05-06

## 2017-05-06 RX ADMIN — HEPARIN SODIUM 5000 UNIT(S): 5000 INJECTION INTRAVENOUS; SUBCUTANEOUS at 12:51

## 2017-05-06 RX ADMIN — Medication 325 MILLIGRAM(S): at 12:49

## 2017-05-06 RX ADMIN — SIMVASTATIN 20 MILLIGRAM(S): 20 TABLET, FILM COATED ORAL at 21:44

## 2017-05-06 RX ADMIN — HEPARIN SODIUM 5000 UNIT(S): 5000 INJECTION INTRAVENOUS; SUBCUTANEOUS at 21:46

## 2017-05-06 RX ADMIN — Medication 50 MILLIGRAM(S): at 19:07

## 2017-05-06 RX ADMIN — MIRTAZAPINE 15 MILLIGRAM(S): 45 TABLET, ORALLY DISINTEGRATING ORAL at 21:45

## 2017-05-06 RX ADMIN — CEFTRIAXONE 100 GRAM(S): 500 INJECTION, POWDER, FOR SOLUTION INTRAMUSCULAR; INTRAVENOUS at 10:48

## 2017-05-06 RX ADMIN — HEPARIN SODIUM 5000 UNIT(S): 5000 INJECTION INTRAVENOUS; SUBCUTANEOUS at 05:21

## 2017-05-06 RX ADMIN — PANTOPRAZOLE SODIUM 40 MILLIGRAM(S): 20 TABLET, DELAYED RELEASE ORAL at 05:21

## 2017-05-06 RX ADMIN — Medication 10 MILLIGRAM(S): at 00:40

## 2017-05-06 RX ADMIN — Medication 50 MILLIGRAM(S): at 05:21

## 2017-05-06 RX ADMIN — ERYTHROPOIETIN 10000 UNIT(S): 10000 INJECTION, SOLUTION INTRAVENOUS; SUBCUTANEOUS at 12:50

## 2017-05-06 NOTE — PROVIDER CONTACT NOTE (OTHER) - ACTION/TREATMENT ORDERED:
Jeb Quintana will put an order in for intermittent catheterizations; the frequency will be as per patient request Jeb Quintana will put an order in for intermittent catheterizations; the frequency will be as per patient request. Bladder scan doesn't need to be done prior to catheterization

## 2017-05-06 NOTE — PROVIDER CONTACT NOTE (OTHER) - ASSESSMENT
Patient is requesting intermittent catheterization. Patient failed self eval of catheterization by urology. Patient had Chris catheter removed today

## 2017-05-06 NOTE — PROVIDER CONTACT NOTE (OTHER) - BACKGROUND
Patient admitted to unit with increased urinary frequency and urinary tract infection; patient is on IV ceftriaxone for UTI.

## 2017-05-07 LAB
ALBUMIN SERPL ELPH-MCNC: 3 G/DL — LOW (ref 3.3–5)
ALP SERPL-CCNC: 64 U/L — SIGNIFICANT CHANGE UP (ref 40–120)
ALT FLD-CCNC: 13 U/L — SIGNIFICANT CHANGE UP (ref 10–45)
ANION GAP SERPL CALC-SCNC: 13 MMOL/L — SIGNIFICANT CHANGE UP (ref 5–17)
AST SERPL-CCNC: 18 U/L — SIGNIFICANT CHANGE UP (ref 10–40)
BASOPHILS # BLD AUTO: 0.03 K/UL — SIGNIFICANT CHANGE UP (ref 0–0.2)
BASOPHILS NFR BLD AUTO: 0.4 % — SIGNIFICANT CHANGE UP (ref 0–2)
BILIRUB SERPL-MCNC: 0.2 MG/DL — SIGNIFICANT CHANGE UP (ref 0.2–1.2)
BUN SERPL-MCNC: 22 MG/DL — SIGNIFICANT CHANGE UP (ref 7–23)
CALCIUM SERPL-MCNC: 9.8 MG/DL — SIGNIFICANT CHANGE UP (ref 8.4–10.5)
CHLORIDE SERPL-SCNC: 106 MMOL/L — SIGNIFICANT CHANGE UP (ref 96–108)
CO2 SERPL-SCNC: 20 MMOL/L — LOW (ref 22–31)
CREAT SERPL-MCNC: 2.4 MG/DL — HIGH (ref 0.5–1.3)
EOSINOPHIL # BLD AUTO: 0.36 K/UL — SIGNIFICANT CHANGE UP (ref 0–0.5)
EOSINOPHIL NFR BLD AUTO: 4.4 % — SIGNIFICANT CHANGE UP (ref 0–6)
GLUCOSE SERPL-MCNC: 87 MG/DL — SIGNIFICANT CHANGE UP (ref 70–99)
HCT VFR BLD CALC: 31.8 % — LOW (ref 34.5–45)
HGB BLD-MCNC: 9.6 G/DL — LOW (ref 11.5–15.5)
IMM GRANULOCYTES NFR BLD AUTO: 0.6 % — SIGNIFICANT CHANGE UP (ref 0–1.5)
LYMPHOCYTES # BLD AUTO: 2.44 K/UL — SIGNIFICANT CHANGE UP (ref 1–3.3)
LYMPHOCYTES # BLD AUTO: 29.7 % — SIGNIFICANT CHANGE UP (ref 13–44)
MCHC RBC-ENTMCNC: 27.7 PG — SIGNIFICANT CHANGE UP (ref 27–34)
MCHC RBC-ENTMCNC: 30.2 GM/DL — LOW (ref 32–36)
MCV RBC AUTO: 91.9 FL — SIGNIFICANT CHANGE UP (ref 80–100)
MONOCYTES # BLD AUTO: 0.92 K/UL — HIGH (ref 0–0.9)
MONOCYTES NFR BLD AUTO: 11.2 % — SIGNIFICANT CHANGE UP (ref 2–14)
NEUTROPHILS # BLD AUTO: 4.41 K/UL — SIGNIFICANT CHANGE UP (ref 1.8–7.4)
NEUTROPHILS NFR BLD AUTO: 53.7 % — SIGNIFICANT CHANGE UP (ref 43–77)
PLATELET # BLD AUTO: 336 K/UL — SIGNIFICANT CHANGE UP (ref 150–400)
POTASSIUM SERPL-MCNC: 4.5 MMOL/L — SIGNIFICANT CHANGE UP (ref 3.5–5.3)
POTASSIUM SERPL-SCNC: 4.5 MMOL/L — SIGNIFICANT CHANGE UP (ref 3.5–5.3)
PROT SERPL-MCNC: 7.7 G/DL — SIGNIFICANT CHANGE UP (ref 6–8.3)
RBC # BLD: 3.46 M/UL — LOW (ref 3.8–5.2)
RBC # FLD: 15.1 % — HIGH (ref 10.3–14.5)
SODIUM SERPL-SCNC: 139 MMOL/L — SIGNIFICANT CHANGE UP (ref 135–145)
WBC # BLD: 8.21 K/UL — SIGNIFICANT CHANGE UP (ref 3.8–10.5)
WBC # FLD AUTO: 8.21 K/UL — SIGNIFICANT CHANGE UP (ref 3.8–10.5)

## 2017-05-07 PROCEDURE — 99232 SBSQ HOSP IP/OBS MODERATE 35: CPT

## 2017-05-07 RX ORDER — ERYTHROPOIETIN 10000 [IU]/ML
10000 INJECTION, SOLUTION INTRAVENOUS; SUBCUTANEOUS ONCE
Qty: 0 | Refills: 0 | Status: DISCONTINUED | OUTPATIENT
Start: 2017-05-07 | End: 2017-05-07

## 2017-05-07 RX ORDER — ERYTHROPOIETIN 10000 [IU]/ML
10000 INJECTION, SOLUTION INTRAVENOUS; SUBCUTANEOUS ONCE
Qty: 0 | Refills: 0 | Status: COMPLETED | OUTPATIENT
Start: 2017-05-07 | End: 2017-05-07

## 2017-05-07 RX ADMIN — ERYTHROPOIETIN 10000 UNIT(S): 10000 INJECTION, SOLUTION INTRAVENOUS; SUBCUTANEOUS at 18:31

## 2017-05-07 RX ADMIN — Medication 1 TABLET(S): at 18:31

## 2017-05-07 RX ADMIN — Medication 325 MILLIGRAM(S): at 12:57

## 2017-05-07 RX ADMIN — Medication 50 MILLIGRAM(S): at 05:12

## 2017-05-07 RX ADMIN — SIMVASTATIN 20 MILLIGRAM(S): 20 TABLET, FILM COATED ORAL at 21:47

## 2017-05-07 RX ADMIN — MIRTAZAPINE 15 MILLIGRAM(S): 45 TABLET, ORALLY DISINTEGRATING ORAL at 21:47

## 2017-05-07 RX ADMIN — HEPARIN SODIUM 5000 UNIT(S): 5000 INJECTION INTRAVENOUS; SUBCUTANEOUS at 05:11

## 2017-05-07 RX ADMIN — Medication 50 MILLIGRAM(S): at 18:32

## 2017-05-07 RX ADMIN — CEFTRIAXONE 100 GRAM(S): 500 INJECTION, POWDER, FOR SOLUTION INTRAMUSCULAR; INTRAVENOUS at 09:44

## 2017-05-07 RX ADMIN — HEPARIN SODIUM 5000 UNIT(S): 5000 INJECTION INTRAVENOUS; SUBCUTANEOUS at 12:57

## 2017-05-07 RX ADMIN — PANTOPRAZOLE SODIUM 40 MILLIGRAM(S): 20 TABLET, DELAYED RELEASE ORAL at 05:11

## 2017-05-07 RX ADMIN — HEPARIN SODIUM 5000 UNIT(S): 5000 INJECTION INTRAVENOUS; SUBCUTANEOUS at 21:47

## 2017-05-07 NOTE — PHYSICAL THERAPY INITIAL EVALUATION ADULT - GAIT DEVIATIONS NOTED, PT EVAL
decreased step length/verbal cueing for proper use of walker during amb/decreased weight-shifting ability/decreased margarita

## 2017-05-07 NOTE — PHYSICAL THERAPY INITIAL EVALUATION ADULT - GENERAL OBSERVATIONS, REHAB EVAL
Pt received Pt received supine in bed, +IVL, +chair alarm, A&Ox2 (self and place), +roque Pt received supine in bed, +IVL, +chair alarm, A&Ox2 (self and place), +roque, dressing on R knee, slightly swollen

## 2017-05-07 NOTE — PHYSICAL THERAPY INITIAL EVALUATION ADULT - DISCHARGE DISPOSITION, PT EVAL
rehabilitation facility/DC subacute rehab; if pt to go home, home PT, recommend use of rolling walker, assistance for all mobility/ADLs, Dc plan to be emailed, CM to be notified, NP aware.

## 2017-05-07 NOTE — PHYSICAL THERAPY INITIAL EVALUATION ADULT - ADDITIONAL COMMENTS
Per pt, pt lives with spouse in apt with 6 steps to enter, +elevator inside to 6th floor, PTA ind amb and ADLs, per CM notes HHA 5 daysx4 hrs; spouse able to assist as needed; owns rolling walker (used walker after surgery); family did not answer when PT attempted to call

## 2017-05-07 NOTE — PHYSICAL THERAPY INITIAL EVALUATION ADULT - PRECAUTIONS/LIMITATIONS, REHAB EVAL
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 CT abd/pelvis: No hydronephrosis or nephrolithiasis.Thickened and mildly trabeculated bladder with indwelling Chris catheter. Renal atrophy. 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 CT abd/pelvis: No hydronephrosis or nephrolithiasis.Thickened and mildly trabeculated bladder with indwelling Chris catheter. Renal atrophy./fall precautions

## 2017-05-07 NOTE — PHYSICAL THERAPY INITIAL EVALUATION ADULT - PERTINENT HX OF CURRENT PROBLEM, REHAB EVAL
Pt is a 82F admitted to [facility] on 5/4/17 for increased urinary frequency. Pt reports feeling bladder pressure, urge to urinate, self caths at home. Pt denies dysuria, fever/chills/abd pain/flank pain/CP/SOB/diarrhea.

## 2017-05-07 NOTE — PHYSICAL THERAPY INITIAL EVALUATION ADULT - ACTIVE RANGE OF MOTION EXAMINATION, REHAB EVAL
BUE and BLE WFl/Left UE Active ROM was WFL (within functional limits)/Right UE Active ROM was WFL (within functional limits)/Right LE Active ROM was WFL (within functional limits)/Left LE Active ROM was WFL (within functional limits)

## 2017-05-08 ENCOUNTER — TRANSCRIPTION ENCOUNTER (OUTPATIENT)
Age: 82
End: 2017-05-08

## 2017-05-08 VITALS — DIASTOLIC BLOOD PRESSURE: 90 MMHG | SYSTOLIC BLOOD PRESSURE: 157 MMHG | HEART RATE: 83 BPM

## 2017-05-08 LAB
% ALBUMIN: 39.6 % — SIGNIFICANT CHANGE UP
% ALPHA 1: 6.9 % — SIGNIFICANT CHANGE UP
% ALPHA 2: 14.5 % — SIGNIFICANT CHANGE UP
% BETA: 14.4 % — SIGNIFICANT CHANGE UP
% GAMMA: 24.6 % — SIGNIFICANT CHANGE UP
ALBUMIN SERPL ELPH-MCNC: 2.7 G/DL — LOW (ref 3.6–5.5)
ALBUMIN/GLOB SERPL ELPH: 0.7 RATIO — SIGNIFICANT CHANGE UP
ALPHA1 GLOB SERPL ELPH-MCNC: 0.5 G/DL — HIGH (ref 0.1–0.4)
ALPHA2 GLOB SERPL ELPH-MCNC: 1 G/DL — SIGNIFICANT CHANGE UP (ref 0.5–1)
ANION GAP SERPL CALC-SCNC: 14 MMOL/L — SIGNIFICANT CHANGE UP (ref 5–17)
B-GLOBULIN SERPL ELPH-MCNC: 1 G/DL — SIGNIFICANT CHANGE UP (ref 0.5–1)
BUN SERPL-MCNC: 27 MG/DL — HIGH (ref 7–23)
CALCIUM SERPL-MCNC: 9.8 MG/DL — SIGNIFICANT CHANGE UP (ref 8.4–10.5)
CHLORIDE SERPL-SCNC: 104 MMOL/L — SIGNIFICANT CHANGE UP (ref 96–108)
CO2 SERPL-SCNC: 19 MMOL/L — LOW (ref 22–31)
CREAT SERPL-MCNC: 2.41 MG/DL — HIGH (ref 0.5–1.3)
GAMMA GLOBULIN: 1.7 G/DL — HIGH (ref 0.6–1.6)
GLUCOSE SERPL-MCNC: 86 MG/DL — SIGNIFICANT CHANGE UP (ref 70–99)
HCT VFR BLD CALC: 29.9 % — LOW (ref 34.5–45)
HGB BLD-MCNC: 9.3 G/DL — LOW (ref 11.5–15.5)
INTERPRETATION SERPL IFE-IMP: SIGNIFICANT CHANGE UP
M PROTEIN 24H UR ELPH-MRATE: SIGNIFICANT CHANGE UP
MCHC RBC-ENTMCNC: 28.3 PG — SIGNIFICANT CHANGE UP (ref 27–34)
MCHC RBC-ENTMCNC: 31.1 GM/DL — LOW (ref 32–36)
MCV RBC AUTO: 90.9 FL — SIGNIFICANT CHANGE UP (ref 80–100)
PLATELET # BLD AUTO: 313 K/UL — SIGNIFICANT CHANGE UP (ref 150–400)
POTASSIUM SERPL-MCNC: 4.6 MMOL/L — SIGNIFICANT CHANGE UP (ref 3.5–5.3)
POTASSIUM SERPL-SCNC: 4.6 MMOL/L — SIGNIFICANT CHANGE UP (ref 3.5–5.3)
PROT PATTERN SERPL ELPH-IMP: SIGNIFICANT CHANGE UP
RBC # BLD: 3.29 M/UL — LOW (ref 3.8–5.2)
RBC # FLD: 14.6 % — HIGH (ref 10.3–14.5)
SODIUM SERPL-SCNC: 137 MMOL/L — SIGNIFICANT CHANGE UP (ref 135–145)
WBC # BLD: 8.78 K/UL — SIGNIFICANT CHANGE UP (ref 3.8–10.5)
WBC # FLD AUTO: 8.78 K/UL — SIGNIFICANT CHANGE UP (ref 3.8–10.5)

## 2017-05-08 PROCEDURE — 86334 IMMUNOFIX E-PHORESIS SERUM: CPT

## 2017-05-08 PROCEDURE — 87086 URINE CULTURE/COLONY COUNT: CPT

## 2017-05-08 PROCEDURE — 82330 ASSAY OF CALCIUM: CPT

## 2017-05-08 PROCEDURE — 85014 HEMATOCRIT: CPT

## 2017-05-08 PROCEDURE — 99285 EMERGENCY DEPT VISIT HI MDM: CPT | Mod: 25

## 2017-05-08 PROCEDURE — 85610 PROTHROMBIN TIME: CPT

## 2017-05-08 PROCEDURE — 84165 PROTEIN E-PHORESIS SERUM: CPT

## 2017-05-08 PROCEDURE — 84156 ASSAY OF PROTEIN URINE: CPT

## 2017-05-08 PROCEDURE — 99233 SBSQ HOSP IP/OBS HIGH 50: CPT

## 2017-05-08 PROCEDURE — 99239 HOSP IP/OBS DSCHRG MGMT >30: CPT

## 2017-05-08 PROCEDURE — 81001 URINALYSIS AUTO W/SCOPE: CPT

## 2017-05-08 PROCEDURE — 83605 ASSAY OF LACTIC ACID: CPT

## 2017-05-08 PROCEDURE — 51702 INSERT TEMP BLADDER CATH: CPT

## 2017-05-08 PROCEDURE — 85027 COMPLETE CBC AUTOMATED: CPT

## 2017-05-08 PROCEDURE — 84100 ASSAY OF PHOSPHORUS: CPT

## 2017-05-08 PROCEDURE — 82652 VIT D 1 25-DIHYDROXY: CPT

## 2017-05-08 PROCEDURE — 83970 ASSAY OF PARATHORMONE: CPT

## 2017-05-08 PROCEDURE — 82310 ASSAY OF CALCIUM: CPT

## 2017-05-08 PROCEDURE — 84295 ASSAY OF SERUM SODIUM: CPT

## 2017-05-08 PROCEDURE — 85730 THROMBOPLASTIN TIME PARTIAL: CPT

## 2017-05-08 PROCEDURE — 80048 BASIC METABOLIC PNL TOTAL CA: CPT

## 2017-05-08 PROCEDURE — 84132 ASSAY OF SERUM POTASSIUM: CPT

## 2017-05-08 PROCEDURE — 87040 BLOOD CULTURE FOR BACTERIA: CPT

## 2017-05-08 PROCEDURE — 83735 ASSAY OF MAGNESIUM: CPT

## 2017-05-08 PROCEDURE — 82947 ASSAY GLUCOSE BLOOD QUANT: CPT

## 2017-05-08 PROCEDURE — 86325 OTHER IMMUNOELECTROPHORESIS: CPT

## 2017-05-08 PROCEDURE — 74176 CT ABD & PELVIS W/O CONTRAST: CPT

## 2017-05-08 PROCEDURE — 82803 BLOOD GASES ANY COMBINATION: CPT

## 2017-05-08 PROCEDURE — 80053 COMPREHEN METABOLIC PANEL: CPT

## 2017-05-08 PROCEDURE — 96374 THER/PROPH/DIAG INJ IV PUSH: CPT | Mod: XU

## 2017-05-08 PROCEDURE — 97161 PT EVAL LOW COMPLEX 20 MIN: CPT

## 2017-05-08 PROCEDURE — 82435 ASSAY OF BLOOD CHLORIDE: CPT

## 2017-05-08 PROCEDURE — 82306 VITAMIN D 25 HYDROXY: CPT

## 2017-05-08 PROCEDURE — 87186 SC STD MICRODIL/AGAR DIL: CPT

## 2017-05-08 PROCEDURE — 93005 ELECTROCARDIOGRAM TRACING: CPT

## 2017-05-08 PROCEDURE — 84155 ASSAY OF PROTEIN SERUM: CPT

## 2017-05-08 RX ORDER — CEPHALEXIN 500 MG
1 CAPSULE ORAL
Qty: 21 | Refills: 0 | OUTPATIENT
Start: 2017-05-08 | End: 2017-05-15

## 2017-05-08 RX ADMIN — Medication 1 TABLET(S): at 05:58

## 2017-05-08 RX ADMIN — Medication 50 MILLIGRAM(S): at 05:59

## 2017-05-08 RX ADMIN — HEPARIN SODIUM 5000 UNIT(S): 5000 INJECTION INTRAVENOUS; SUBCUTANEOUS at 05:59

## 2017-05-08 RX ADMIN — PANTOPRAZOLE SODIUM 40 MILLIGRAM(S): 20 TABLET, DELAYED RELEASE ORAL at 05:59

## 2017-05-08 NOTE — PROVIDER CONTACT NOTE (OTHER) - REASON
Hypertension
Patient is hypertensive
Patient is hypertensive
Patient had roque catheter removed today; patient is requesting intermittent catheterizations by provider (failed self catheterization with urology consult); no intermittent catheterization is ordered
Patient is hypertensive

## 2017-05-08 NOTE — PROVIDER CONTACT NOTE (OTHER) - ASSESSMENT
Pt is A&Ox2-3 confused and forgetful at times as per baseline. Pt denies headache, palpitations, chest pain, dizziness, and SOB.

## 2017-05-08 NOTE — PROVIDER CONTACT NOTE (OTHER) - SITUATION
Patient is hypertensive
Patient had roque catheter removed today; patient is requesting intermittent catheterizations by provider (failed self catheterization with urology consult); no intermittent catheterization is ordered
Patient is hypertensive
Patient is hypertensive
Pts /99 manually pt is asymptomatic.

## 2017-05-09 LAB
CULTURE RESULTS: SIGNIFICANT CHANGE UP
CULTURE RESULTS: SIGNIFICANT CHANGE UP
SPECIMEN SOURCE: SIGNIFICANT CHANGE UP
SPECIMEN SOURCE: SIGNIFICANT CHANGE UP

## 2017-05-25 ENCOUNTER — TRANSCRIPTION ENCOUNTER (OUTPATIENT)
Age: 82
End: 2017-05-25

## 2017-05-26 ENCOUNTER — TRANSCRIPTION ENCOUNTER (OUTPATIENT)
Age: 82
End: 2017-05-26

## 2017-06-06 ENCOUNTER — TRANSCRIPTION ENCOUNTER (OUTPATIENT)
Age: 82
End: 2017-06-06

## 2017-06-15 ENCOUNTER — APPOINTMENT (OUTPATIENT)
Dept: UROLOGY | Facility: CLINIC | Age: 82
End: 2017-06-15

## 2017-06-15 VITALS
BODY MASS INDEX: 28.05 KG/M2 | WEIGHT: 130 LBS | HEART RATE: 88 BPM | HEIGHT: 57 IN | DIASTOLIC BLOOD PRESSURE: 83 MMHG | SYSTOLIC BLOOD PRESSURE: 144 MMHG | TEMPERATURE: 98.8 F

## 2017-06-15 DIAGNOSIS — N32.89 OTHER SPECIFIED DISORDERS OF BLADDER: ICD-10-CM

## 2017-06-20 ENCOUNTER — APPOINTMENT (OUTPATIENT)
Dept: GERIATRICS | Facility: CLINIC | Age: 82
End: 2017-06-20

## 2017-06-20 VITALS
BODY MASS INDEX: 27.84 KG/M2 | RESPIRATION RATE: 15 BRPM | SYSTOLIC BLOOD PRESSURE: 102 MMHG | HEART RATE: 95 BPM | TEMPERATURE: 98 F | OXYGEN SATURATION: 96 % | WEIGHT: 129.05 LBS | HEIGHT: 57 IN | DIASTOLIC BLOOD PRESSURE: 60 MMHG

## 2017-06-21 LAB
ANION GAP SERPL CALC-SCNC: 17 MMOL/L
BASOPHILS # BLD AUTO: 0.02 K/UL
BASOPHILS NFR BLD AUTO: 0.2 %
BUN SERPL-MCNC: 51 MG/DL
CALCIUM SERPL-MCNC: 10.6 MG/DL
CHLORIDE SERPL-SCNC: 102 MMOL/L
CO2 SERPL-SCNC: 19 MMOL/L
CREAT SERPL-MCNC: 3.13 MG/DL
EOSINOPHIL # BLD AUTO: 0.19 K/UL
EOSINOPHIL NFR BLD AUTO: 2.3 %
GLUCOSE SERPL-MCNC: 131 MG/DL
HCT VFR BLD CALC: 32.3 %
HGB BLD-MCNC: 9.8 G/DL
IMM GRANULOCYTES NFR BLD AUTO: 0.6 %
LYMPHOCYTES # BLD AUTO: 2.46 K/UL
LYMPHOCYTES NFR BLD AUTO: 29.1 %
MAN DIFF?: NORMAL
MCHC RBC-ENTMCNC: 28.4 PG
MCHC RBC-ENTMCNC: 30.3 GM/DL
MCV RBC AUTO: 93.6 FL
MONOCYTES # BLD AUTO: 0.71 K/UL
MONOCYTES NFR BLD AUTO: 8.4 %
NEUTROPHILS # BLD AUTO: 5.01 K/UL
NEUTROPHILS NFR BLD AUTO: 59.4 %
PLATELET # BLD AUTO: 309 K/UL
POTASSIUM SERPL-SCNC: 4.9 MMOL/L
RBC # BLD: 3.45 M/UL
RBC # FLD: 15.5 %
SODIUM SERPL-SCNC: 138 MMOL/L
WBC # FLD AUTO: 8.44 K/UL

## 2017-07-13 ENCOUNTER — APPOINTMENT (OUTPATIENT)
Dept: UROLOGY | Facility: CLINIC | Age: 82
End: 2017-07-13

## 2017-07-13 DIAGNOSIS — A49.9 URINARY TRACT INFECTION, SITE NOT SPECIFIED: ICD-10-CM

## 2017-07-13 DIAGNOSIS — N39.0 URINARY TRACT INFECTION, SITE NOT SPECIFIED: ICD-10-CM

## 2017-07-24 ENCOUNTER — RX RENEWAL (OUTPATIENT)
Age: 82
End: 2017-07-24

## 2017-07-24 ENCOUNTER — CLINICAL ADVICE (OUTPATIENT)
Age: 82
End: 2017-07-24

## 2017-07-26 DIAGNOSIS — Z79.2 LONG TERM (CURRENT) USE OF ANTIBIOTICS: ICD-10-CM

## 2017-07-27 ENCOUNTER — CLINICAL ADVICE (OUTPATIENT)
Age: 82
End: 2017-07-27

## 2017-07-27 PROBLEM — Z79.2 PROPHYLACTIC ANTIBIOTIC FOR DENTAL PROCEDURE INDICATED DUE TO PRIOR JOINT REPLACEMENT: Status: ACTIVE | Noted: 2017-07-27

## 2017-07-29 ENCOUNTER — INPATIENT (INPATIENT)
Facility: HOSPITAL | Age: 82
LOS: 5 days | Discharge: NURSING, REHAB OR LONG-TERM CARE | DRG: 690 | End: 2017-08-04
Attending: FAMILY MEDICINE | Admitting: FAMILY MEDICINE
Payer: MEDICARE

## 2017-07-29 VITALS
OXYGEN SATURATION: 99 % | DIASTOLIC BLOOD PRESSURE: 99 MMHG | HEART RATE: 116 BPM | RESPIRATION RATE: 18 BRPM | WEIGHT: 139.99 LBS | HEIGHT: 63 IN | SYSTOLIC BLOOD PRESSURE: 179 MMHG | TEMPERATURE: 100 F

## 2017-07-29 PROCEDURE — 73502 X-RAY EXAM HIP UNI 2-3 VIEWS: CPT | Mod: 26,RT

## 2017-07-29 PROCEDURE — 71010: CPT | Mod: 26

## 2017-07-29 PROCEDURE — 73562 X-RAY EXAM OF KNEE 3: CPT | Mod: 26,RT

## 2017-07-29 PROCEDURE — 73590 X-RAY EXAM OF LOWER LEG: CPT | Mod: 26,RT

## 2017-07-29 PROCEDURE — 72170 X-RAY EXAM OF PELVIS: CPT | Mod: 26,59

## 2017-07-29 PROCEDURE — 73552 X-RAY EXAM OF FEMUR 2/>: CPT | Mod: 26,RT

## 2017-07-29 NOTE — ED PROVIDER NOTE - OBJECTIVE STATEMENT
83 y/o female with a PMHx of HTN and dementia present to ED c/o head laceration and R knee pain s/p slip and fall.  notes the pt used to self catheter, now with roque cath in place.  notes the pt ripped out bag but cath is still in place. he reports the pt slipped on her urine and hit the back of her head causing laceration. fall was not witnesses. Cath bag was last changed 15 days ago. 83 y/o female with a PMHx of HTN and dementia present to ED c/o head laceration and R knee pain s/p slip and fall.  notes the pt used to self catheter, now with roque cath in place.  notes the pt bag from roque was lost in the morning, but cath is still in place. he reports the pt slipped on her urine and hit the back of her head causing bleeding.. fall was not witnessed, but they saw the patient right arfter and there was no LOC. Cath bag was last changed 15 days ago. 81 y/o female with a PMHx of HTN and dementia present to ED c/o head laceration and R knee pain s/p slip and fall.  notes the pt used to self catheter, now with roque cath in place.  notes the pt bag from roque was lost in the morning, but cath is still in place. he reports the pt slipped on her urine and hit the back of her head causing bleeding.. fall was not witnessed, but they saw the patient right after and there was no LOC. Cath bag was last changed 15 days ago.  also reports a chronic wound over the right lower anterior knee that constantly oozes.

## 2017-07-29 NOTE — ED PROVIDER NOTE - MEDICAL DECISION MAKING DETAILS
83 y/o female c/o head laceration s/o fall x today. Will send for CT, XR, labs, replace cath bag and reassess.

## 2017-07-29 NOTE — ED PROVIDER NOTE - AREA
"Subjective   Nandini Morrison is a 32 y.o. female is here today for follow-up.  Chief Complaint   Patient presents with   • Diabetes     no recent labs, testing BG 4 times daily, pt did not bring meter   • Goiter   • Vitamin D Deficiency     /66  Ht 67\" (170.2 cm)  Wt 171 lb 3.2 oz (77.7 kg)  BMI 26.81 kg/m2  Current Outpatient Prescriptions on File Prior to Visit   Medication Sig   • ACCU-CHEK KALYN PLUS test strip 1 each by Other route 4 (Four) Times a Day. Use as instructed   • ACCU-CHEK FASTCLIX LANCETS misc 4 (four) times a day.   • ergocalciferol (DRISDOL) 06531 UNITS capsule Take 1 po q week   • glucagon (GLUCAGON EMERGENCY) 1 MG injection Inject 1 mg under the skin 1 (one) time as needed for low blood sugar for up to 1 dose.   • Multiple Vitamins-Minerals (MULTIVITAMIN PO) Take 1 tablet by mouth daily.   • Needle, Disp, (BD DISP NEEDLES) 25G X 5/8\" misc 4 times a day for insulin injection.   • NOVOLOG FLEXPEN 100 UNIT/ML solution pen-injector sc pen Up to 50 units daily for multiple injections   • TRESIBA FLEXTOUCH 200 UNIT/ML solution pen-injector Inject 25 Units under the skin Daily.     No current facility-administered medications on file prior to visit.      Family History   Problem Relation Age of Onset   • Diabetes Maternal Aunt    • Hypertension Maternal Aunt      Social History   Substance Use Topics   • Smoking status: Never Smoker   • Smokeless tobacco: None   • Alcohol use Yes      Comment: socially          History of Present Illness      Encounter Diagnoses   Name Primary?   • Uncontrolled type 1 diabetes mellitus with complication Yes   • Nontoxic multinodular goiter    • Vitamin D deficiency disease    This is a 32-year-old female patient here today for routine follow-up visit.  She has not had any recent labs done.  She is being seen for the above-mentioned problems.  She did not bring her blood glucose meter in today's visit.  Her labs her last visit were reviewed.  Her hemoglobin " A1c reflects her diabetes is not under adequate control.  She denies needing any glucagon and states she has not had any severe hypoglycemic events.  She recently ran a half marathon.  She has no complaints at today's visit.    The following portions of the patient's history were reviewed and updated as appropriate: allergies, current medications, past family history, past medical history, past social history, past surgical history and problem list.    Review of Systems   Constitutional: Negative for fatigue.   HENT: Negative for trouble swallowing.    Eyes: Negative for visual disturbance.   Respiratory: Negative for shortness of breath.    Cardiovascular: Negative for leg swelling.   Endocrine: Negative for polyuria.   Skin: Negative for wound.   Neurological: Negative for numbness.       Objective   Physical Exam   Constitutional: She is oriented to person, place, and time. She appears well-developed and well-nourished. No distress.   HENT:   Head: Normocephalic and atraumatic.   Right Ear: External ear normal.   Left Ear: External ear normal.   Nose: Nose normal.   Mouth/Throat: Oropharynx is clear and moist. No oropharyngeal exudate.   Eyes: EOM are normal. Pupils are equal, round, and reactive to light. Right eye exhibits no discharge. Left eye exhibits no discharge.   Neck: Trachea normal, normal range of motion and full passive range of motion without pain. Neck supple. No tracheal tenderness present. Carotid bruit is not present. No tracheal deviation, no edema and no erythema present. No thyroid mass and no thyromegaly present.   Cardiovascular: Normal rate, regular rhythm, normal heart sounds and intact distal pulses.  Exam reveals no gallop and no friction rub.    No murmur heard.  Pulmonary/Chest: Effort normal and breath sounds normal. No stridor. No respiratory distress. She has no wheezes. She has no rales.   Abdominal: Soft. Bowel sounds are normal. She exhibits no distension.   Musculoskeletal:  Normal range of motion. She exhibits no edema or deformity.   Lymphadenopathy:     She has no cervical adenopathy.   Neurological: She is alert and oriented to person, place, and time.   Skin: Skin is warm and dry. No rash noted. She is not diaphoretic. No erythema. No pallor.   Psychiatric: She has a normal mood and affect. Her behavior is normal. Judgment and thought content normal.   Nursing note and vitals reviewed.      Lab Results   Component Value Date    HGBA1C 7.74 (H) 01/23/2017     Lab on 01/23/2017   Component Date Value Ref Range Status   • Glucose 01/23/2017 170* 65 - 99 mg/dL Final   • BUN 01/23/2017 10  6 - 20 mg/dL Final   • Creatinine 01/23/2017 0.88  0.57 - 1.00 mg/dL Final   • eGFR Non  Am 01/23/2017 75  >60 mL/min/1.73 Final   • eGFR African Am 01/23/2017 91  >60 mL/min/1.73 Final   • BUN/Creatinine Ratio 01/23/2017 11.4  7.0 - 25.0 Final   • Sodium 01/23/2017 140  136 - 145 mmol/L Final   • Potassium 01/23/2017 4.2  3.5 - 5.2 mmol/L Final   • Chloride 01/23/2017 100  98 - 107 mmol/L Final   • Total CO2 01/23/2017 25.3  22.0 - 29.0 mmol/L Final   • Calcium 01/23/2017 9.9  8.6 - 10.5 mg/dL Final   • Total Protein 01/23/2017 7.1  6.0 - 8.5 g/dL Final   • Albumin 01/23/2017 4.40  3.50 - 5.20 g/dL Final   • Globulin 01/23/2017 2.7  gm/dL Final   • A/G Ratio 01/23/2017 1.6  g/dL Final   • Total Bilirubin 01/23/2017 0.5  0.1 - 1.2 mg/dL Final   • Alkaline Phosphatase 01/23/2017 53  39 - 117 U/L Final   • AST (SGOT) 01/23/2017 35* 1 - 32 U/L Final   • ALT (SGPT) 01/23/2017 27  1 - 33 U/L Final   • Hemoglobin A1C 01/23/2017 7.74* 4.80 - 5.60 % Final    Comment: Hemoglobin A1C Ranges:  Increased Risk for Diabetes  5.7% to 6.4%  Diabetes                     >= 6.5%  Diabetic Goal                < 7.0%     • Total Cholesterol 01/23/2017 185  0 - 200 mg/dL Final   • Triglycerides 01/23/2017 74  0 - 150 mg/dL Final   • HDL Cholesterol 01/23/2017 77* 40 - 60 mg/dL Final   • VLDL Cholesterol 01/23/2017  14.8  5 - 40 mg/dL Final   • LDL Cholesterol  01/23/2017 93  0 - 100 mg/dL Final   • T3, Free 01/23/2017 3.3  2.0 - 4.4 pg/mL Final   • TSH 01/23/2017 2.990  0.270 - 4.200 mIU/mL Final   • T4, Total 01/23/2017 7.00  4.50 - 11.70 mcg/dL Final   • Free T4 01/23/2017 1.35  0.93 - 1.70 ng/dL Final   • 25 Hydroxy, Vitamin D 01/23/2017 52.3  30.0 - 100.0 ng/mL Final    Comment: Vitamin D deficiency has been defined by the [place] of  Medicine and an Endocrine Society practice guideline as a  level of serum 25-OH vitamin D less than 20 ng/mL (1,2).  The Endocrine Society went on to further define vitamin D  insufficiency as a level between 21 and 29 ng/mL (2).  1. IOM ([place] of [employer]). 2010. Dietary reference     intakes for calcium and D. [place]: The     National Academies Press.  2. Chet MF, Ladarius GARCIA, Freya BOYER, et al.     Evaluation, treatment, and prevention of vitamin D     deficiency: an Endocrine Society clinical practice     guideline. JCEM. 2011 Jul; 96(7):1911-30.     • Microalbumin, Urine 01/23/2017 13.7  Not Estab. ug/mL Final         Assessment/Plan   Nandini was seen today for diabetes, goiter and vitamin d deficiency.    Diagnoses and all orders for this visit:    Uncontrolled type 1 diabetes mellitus with complication  -     Comprehensive Metabolic Panel  -     T3, Free  -     T4, Free  -     Thyroid Panel With TSH  -     Hemoglobin A1c  -     Lipid Panel  -     MicroAlbumin, Urine, Random  -     Vitamin D 25 Hydroxy    Nontoxic multinodular goiter  -     Comprehensive Metabolic Panel  -     T3, Free  -     T4, Free  -     Thyroid Panel With TSH  -     Hemoglobin A1c  -     Lipid Panel  -     MicroAlbumin, Urine, Random  -     Vitamin D 25 Hydroxy    Vitamin D deficiency disease  -     Comprehensive Metabolic Panel  -     T3, Free  -     T4, Free  -     Thyroid Panel With TSH  -     Hemoglobin A1c  -     Lipid Panel  -     MicroAlbumin, Urine, Random  -     Vitamin D 25  Hydroxy      In summary, patient was seen and examined.  She will have extensive laboratory evaluation done at today's visit will be notified of the results along with any further recommendations.  She is to continue all her current medications as prescribed.  No medications were changed at today's appointment.  I've asked that she bring her blood glucose meter with her each visit so that we can make adjustments to her medications pending her blood sugars.  She is to follow-up in 4 months with labs prior if she prefers.  I've encouraged to contact the office should she have any questions or concerns prior to then.             occipital

## 2017-07-29 NOTE — ED PROVIDER NOTE - CARE PLAN
Principal Discharge DX:	Injury of head, initial encounter  Secondary Diagnosis:	UTI (urinary tract infection)  Secondary Diagnosis:	Knee injury, right, initial encounter

## 2017-07-29 NOTE — ED PROVIDER NOTE - MUSCULOSKELETAL, MLM
Spine appears normal, range of motion is not limited. Spine appears normal, range of motion is not limited. TTP of the R anterior knee. No midline, c-spine, or thoracic TTP. Spine appears normal, range of motion is not limited. Spine appears normal, range of motion is not limited. TTP of the R anterior knee, knee is swollen No midline, c-spine, or thoracic TTP.

## 2017-07-30 DIAGNOSIS — N39.0 URINARY TRACT INFECTION, SITE NOT SPECIFIED: ICD-10-CM

## 2017-07-30 DIAGNOSIS — R55 SYNCOPE AND COLLAPSE: ICD-10-CM

## 2017-07-30 DIAGNOSIS — S09.90XA UNSPECIFIED INJURY OF HEAD, INITIAL ENCOUNTER: ICD-10-CM

## 2017-07-30 DIAGNOSIS — N18.9 CHRONIC KIDNEY DISEASE, UNSPECIFIED: ICD-10-CM

## 2017-07-30 DIAGNOSIS — N17.9 ACUTE KIDNEY FAILURE, UNSPECIFIED: ICD-10-CM
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
## 2017-07-30 DIAGNOSIS — I10 ESSENTIAL (PRIMARY) HYPERTENSION: ICD-10-CM

## 2017-07-30 DIAGNOSIS — E78.5 HYPERLIPIDEMIA, UNSPECIFIED: ICD-10-CM

## 2017-07-30 DIAGNOSIS — Z29.9 ENCOUNTER FOR PROPHYLACTIC MEASURES, UNSPECIFIED: ICD-10-CM

## 2017-07-30 LAB
ALBUMIN SERPL ELPH-MCNC: 2.9 G/DL — LOW (ref 3.5–5)
ALP SERPL-CCNC: 77 U/L — SIGNIFICANT CHANGE UP (ref 40–120)
ALT FLD-CCNC: 20 U/L DA — SIGNIFICANT CHANGE UP (ref 10–60)
ANION GAP SERPL CALC-SCNC: 10 MMOL/L — SIGNIFICANT CHANGE UP (ref 5–17)
ANION GAP SERPL CALC-SCNC: 8 MMOL/L — SIGNIFICANT CHANGE UP (ref 5–17)
APPEARANCE UR: CLEAR — SIGNIFICANT CHANGE UP
APTT BLD: 32.3 SEC — SIGNIFICANT CHANGE UP (ref 27.5–37.4)
APTT BLD: 34.9 SEC — SIGNIFICANT CHANGE UP (ref 27.5–37.4)
AST SERPL-CCNC: 16 U/L — SIGNIFICANT CHANGE UP (ref 10–40)
BASOPHILS # BLD AUTO: 0.1 K/UL — SIGNIFICANT CHANGE UP (ref 0–0.2)
BASOPHILS NFR BLD AUTO: 0.5 % — SIGNIFICANT CHANGE UP (ref 0–2)
BILIRUB SERPL-MCNC: 0.4 MG/DL — SIGNIFICANT CHANGE UP (ref 0.2–1.2)
BILIRUB UR-MCNC: NEGATIVE — SIGNIFICANT CHANGE UP
BUN SERPL-MCNC: 43 MG/DL — HIGH (ref 7–18)
BUN SERPL-MCNC: 45 MG/DL — HIGH (ref 7–18)
CALCIUM SERPL-MCNC: 10.8 MG/DL — HIGH (ref 8.4–10.5)
CALCIUM SERPL-MCNC: 9.7 MG/DL — SIGNIFICANT CHANGE UP (ref 8.4–10.5)
CHLORIDE SERPL-SCNC: 104 MMOL/L — SIGNIFICANT CHANGE UP (ref 96–108)
CHLORIDE SERPL-SCNC: 108 MMOL/L — SIGNIFICANT CHANGE UP (ref 96–108)
CHOLEST SERPL-MCNC: 149 MG/DL — SIGNIFICANT CHANGE UP (ref 10–199)
CO2 SERPL-SCNC: 23 MMOL/L — SIGNIFICANT CHANGE UP (ref 22–31)
CO2 SERPL-SCNC: 23 MMOL/L — SIGNIFICANT CHANGE UP (ref 22–31)
COLOR SPEC: YELLOW — SIGNIFICANT CHANGE UP
CREAT SERPL-MCNC: 3.09 MG/DL — HIGH (ref 0.5–1.3)
CREAT SERPL-MCNC: 3.15 MG/DL — HIGH (ref 0.5–1.3)
CRP SERPL-MCNC: 6.9 MG/DL — HIGH (ref 0–0.4)
DIFF PNL FLD: ABNORMAL
EOSINOPHIL # BLD AUTO: 0 K/UL — SIGNIFICANT CHANGE UP (ref 0–0.5)
EOSINOPHIL NFR BLD AUTO: 0.3 % — SIGNIFICANT CHANGE UP (ref 0–6)
ERYTHROCYTE [SEDIMENTATION RATE] IN BLOOD: 115 MM/HR — HIGH (ref 0–20)
FERRITIN SERPL-MCNC: 842 NG/ML — HIGH (ref 15–150)
FOLATE SERPL-MCNC: >20 NG/ML — SIGNIFICANT CHANGE UP (ref 4.8–24.2)
GLUCOSE SERPL-MCNC: 103 MG/DL — HIGH (ref 70–99)
GLUCOSE SERPL-MCNC: 123 MG/DL — HIGH (ref 70–99)
GLUCOSE UR QL: NEGATIVE — SIGNIFICANT CHANGE UP
HAPTOGLOB SERPL-MCNC: 297 MG/DL — HIGH (ref 34–200)
HBA1C BLD-MCNC: 5.2 % — SIGNIFICANT CHANGE UP (ref 4–5.6)
HCT VFR BLD CALC: 28.2 % — LOW (ref 34.5–45)
HCT VFR BLD CALC: 32.4 % — LOW (ref 34.5–45)
HDLC SERPL-MCNC: 49 MG/DL — SIGNIFICANT CHANGE UP (ref 40–125)
HGB BLD-MCNC: 10.4 G/DL — LOW (ref 11.5–15.5)
HGB BLD-MCNC: 8.9 G/DL — LOW (ref 11.5–15.5)
INR BLD: 1.06 RATIO — SIGNIFICANT CHANGE UP (ref 0.88–1.16)
INR BLD: 1.08 RATIO — SIGNIFICANT CHANGE UP (ref 0.88–1.16)
IRON SATN MFR SERPL: 10 % — LOW (ref 15–50)
IRON SATN MFR SERPL: 18 UG/DL — LOW (ref 40–150)
KETONES UR-MCNC: NEGATIVE — SIGNIFICANT CHANGE UP
LACTATE SERPL-SCNC: 1.2 MMOL/L — SIGNIFICANT CHANGE UP (ref 0.7–2)
LDH SERPL L TO P-CCNC: 133 U/L — SIGNIFICANT CHANGE UP (ref 120–225)
LEUKOCYTE ESTERASE UR-ACNC: ABNORMAL
LIPID PNL WITH DIRECT LDL SERPL: 87 MG/DL — SIGNIFICANT CHANGE UP
LYMPHOCYTES # BLD AUTO: 1 K/UL — SIGNIFICANT CHANGE UP (ref 1–3.3)
LYMPHOCYTES # BLD AUTO: 7.1 % — LOW (ref 13–44)
MAGNESIUM SERPL-MCNC: 2.4 MG/DL — SIGNIFICANT CHANGE UP (ref 1.6–2.6)
MCHC RBC-ENTMCNC: 28.9 PG — SIGNIFICANT CHANGE UP (ref 27–34)
MCHC RBC-ENTMCNC: 29.9 PG — SIGNIFICANT CHANGE UP (ref 27–34)
MCHC RBC-ENTMCNC: 31.4 GM/DL — LOW (ref 32–36)
MCHC RBC-ENTMCNC: 31.9 GM/DL — LOW (ref 32–36)
MCV RBC AUTO: 92 FL — SIGNIFICANT CHANGE UP (ref 80–100)
MCV RBC AUTO: 93.6 FL — SIGNIFICANT CHANGE UP (ref 80–100)
MONOCYTES # BLD AUTO: 0.9 K/UL — SIGNIFICANT CHANGE UP (ref 0–0.9)
MONOCYTES NFR BLD AUTO: 6.3 % — SIGNIFICANT CHANGE UP (ref 2–14)
NEUTROPHILS # BLD AUTO: 12.3 K/UL — HIGH (ref 1.8–7.4)
NEUTROPHILS NFR BLD AUTO: 85.8 % — HIGH (ref 43–77)
NITRITE UR-MCNC: NEGATIVE — SIGNIFICANT CHANGE UP
PH UR: 6.5 — SIGNIFICANT CHANGE UP (ref 5–8)
PHOSPHATE SERPL-MCNC: 3.1 MG/DL — SIGNIFICANT CHANGE UP (ref 2.5–4.5)
PLATELET # BLD AUTO: 268 K/UL — SIGNIFICANT CHANGE UP (ref 150–400)
PLATELET # BLD AUTO: 272 K/UL — SIGNIFICANT CHANGE UP (ref 150–400)
POTASSIUM SERPL-MCNC: 4.7 MMOL/L — SIGNIFICANT CHANGE UP (ref 3.5–5.3)
POTASSIUM SERPL-MCNC: 4.8 MMOL/L — SIGNIFICANT CHANGE UP (ref 3.5–5.3)
POTASSIUM SERPL-SCNC: 4.7 MMOL/L — SIGNIFICANT CHANGE UP (ref 3.5–5.3)
POTASSIUM SERPL-SCNC: 4.8 MMOL/L — SIGNIFICANT CHANGE UP (ref 3.5–5.3)
PROT SERPL-MCNC: 8.7 G/DL — HIGH (ref 6–8.3)
PROT UR-MCNC: 100
PROTHROM AB SERPL-ACNC: 11.6 SEC — SIGNIFICANT CHANGE UP (ref 9.8–12.7)
PROTHROM AB SERPL-ACNC: 11.8 SEC — SIGNIFICANT CHANGE UP (ref 9.8–12.7)
RBC # BLD: 3.04 M/UL — LOW (ref 3.8–5.2)
RBC # BLD: 3.06 M/UL — LOW (ref 3.8–5.2)
RBC # BLD: 3.47 M/UL — LOW (ref 3.8–5.2)
RBC # FLD: 14.3 % — SIGNIFICANT CHANGE UP (ref 10.3–14.5)
RBC # FLD: 14.5 % — SIGNIFICANT CHANGE UP (ref 10.3–14.5)
RETICS #: 38.6 K/UL — SIGNIFICANT CHANGE UP (ref 25–125)
RETICS/RBC NFR: 1.3 % — SIGNIFICANT CHANGE UP (ref 0.5–2.5)
SODIUM SERPL-SCNC: 137 MMOL/L — SIGNIFICANT CHANGE UP (ref 135–145)
SODIUM SERPL-SCNC: 139 MMOL/L — SIGNIFICANT CHANGE UP (ref 135–145)
SP GR SPEC: 1.01 — SIGNIFICANT CHANGE UP (ref 1.01–1.02)
TIBC SERPL-MCNC: 173 UG/DL — LOW (ref 250–450)
TOTAL CHOLESTEROL/HDL RATIO MEASUREMENT: 3 RATIO — LOW (ref 3.3–7.1)
TRIGL SERPL-MCNC: 65 MG/DL — SIGNIFICANT CHANGE UP (ref 10–149)
TSH SERPL-MCNC: 3.23 UU/ML — SIGNIFICANT CHANGE UP (ref 0.34–4.82)
UIBC SERPL-MCNC: 155 UG/DL — SIGNIFICANT CHANGE UP (ref 110–370)
UROBILINOGEN FLD QL: NEGATIVE — SIGNIFICANT CHANGE UP
VIT B12 SERPL-MCNC: 567 PG/ML — SIGNIFICANT CHANGE UP (ref 243–894)
WBC # BLD: 11.1 K/UL — HIGH (ref 3.8–10.5)
WBC # BLD: 14.3 K/UL — HIGH (ref 3.8–10.5)
WBC # FLD AUTO: 11.1 K/UL — HIGH (ref 3.8–10.5)
WBC # FLD AUTO: 14.3 K/UL — HIGH (ref 3.8–10.5)

## 2017-07-30 PROCEDURE — 72125 CT NECK SPINE W/O DYE: CPT | Mod: 26

## 2017-07-30 PROCEDURE — 29505 APPLICATION LONG LEG SPLINT: CPT

## 2017-07-30 PROCEDURE — 73562 X-RAY EXAM OF KNEE 3: CPT | Mod: 26,RT

## 2017-07-30 PROCEDURE — 99285 EMERGENCY DEPT VISIT HI MDM: CPT | Mod: 25

## 2017-07-30 PROCEDURE — 70450 CT HEAD/BRAIN W/O DYE: CPT | Mod: 26

## 2017-07-30 PROCEDURE — 71010: CPT | Mod: 26

## 2017-07-30 RX ORDER — SODIUM CHLORIDE 9 MG/ML
1000 INJECTION INTRAMUSCULAR; INTRAVENOUS; SUBCUTANEOUS ONCE
Qty: 0 | Refills: 0 | Status: COMPLETED | OUTPATIENT
Start: 2017-07-30 | End: 2017-07-30

## 2017-07-30 RX ORDER — ACETAMINOPHEN 500 MG
650 TABLET ORAL EVERY 6 HOURS
Qty: 0 | Refills: 0 | Status: DISCONTINUED | OUTPATIENT
Start: 2017-07-30 | End: 2017-08-04

## 2017-07-30 RX ORDER — SODIUM CHLORIDE 9 MG/ML
1000 INJECTION INTRAMUSCULAR; INTRAVENOUS; SUBCUTANEOUS
Qty: 0 | Refills: 0 | Status: DISCONTINUED | OUTPATIENT
Start: 2017-07-30 | End: 2017-08-04

## 2017-07-30 RX ORDER — CEFTRIAXONE 500 MG/1
1 INJECTION, POWDER, FOR SOLUTION INTRAMUSCULAR; INTRAVENOUS ONCE
Qty: 0 | Refills: 0 | Status: COMPLETED | OUTPATIENT
Start: 2017-07-30 | End: 2017-07-30

## 2017-07-30 RX ORDER — HEPARIN SODIUM 5000 [USP'U]/ML
5000 INJECTION INTRAVENOUS; SUBCUTANEOUS EVERY 8 HOURS
Qty: 0 | Refills: 0 | Status: DISCONTINUED | OUTPATIENT
Start: 2017-07-30 | End: 2017-08-04

## 2017-07-30 RX ORDER — ACETAMINOPHEN 500 MG
650 TABLET ORAL ONCE
Qty: 0 | Refills: 0 | Status: COMPLETED | OUTPATIENT
Start: 2017-07-30 | End: 2017-07-30

## 2017-07-30 RX ORDER — MORPHINE SULFATE 50 MG/1
2 CAPSULE, EXTENDED RELEASE ORAL ONCE
Qty: 0 | Refills: 0 | Status: DISCONTINUED | OUTPATIENT
Start: 2017-07-30 | End: 2017-07-30

## 2017-07-30 RX ORDER — PANTOPRAZOLE SODIUM 20 MG/1
40 TABLET, DELAYED RELEASE ORAL
Qty: 0 | Refills: 0 | Status: DISCONTINUED | OUTPATIENT
Start: 2017-07-30 | End: 2017-08-04

## 2017-07-30 RX ORDER — METOPROLOL TARTRATE 50 MG
50 TABLET ORAL
Qty: 0 | Refills: 0 | Status: DISCONTINUED | OUTPATIENT
Start: 2017-07-30 | End: 2017-08-04

## 2017-07-30 RX ORDER — TRAMADOL HYDROCHLORIDE 50 MG/1
25 TABLET ORAL EVERY 8 HOURS
Qty: 0 | Refills: 0 | Status: DISCONTINUED | OUTPATIENT
Start: 2017-07-30 | End: 2017-08-04

## 2017-07-30 RX ORDER — CEFTRIAXONE 500 MG/1
1 INJECTION, POWDER, FOR SOLUTION INTRAMUSCULAR; INTRAVENOUS EVERY 24 HOURS
Qty: 0 | Refills: 0 | Status: DISCONTINUED | OUTPATIENT
Start: 2017-07-30 | End: 2017-08-01

## 2017-07-30 RX ORDER — SIMVASTATIN 20 MG/1
20 TABLET, FILM COATED ORAL AT BEDTIME
Qty: 0 | Refills: 0 | Status: DISCONTINUED | OUTPATIENT
Start: 2017-07-30 | End: 2017-08-04

## 2017-07-30 RX ORDER — MIRTAZAPINE 45 MG/1
30 TABLET, ORALLY DISINTEGRATING ORAL AT BEDTIME
Qty: 0 | Refills: 0 | Status: DISCONTINUED | OUTPATIENT
Start: 2017-07-30 | End: 2017-08-02

## 2017-07-30 RX ORDER — MORPHINE SULFATE 50 MG/1
2 CAPSULE, EXTENDED RELEASE ORAL EVERY 8 HOURS
Qty: 0 | Refills: 0 | Status: DISCONTINUED | OUTPATIENT
Start: 2017-07-30 | End: 2017-08-04

## 2017-07-30 RX ORDER — CHOLECALCIFEROL (VITAMIN D3) 125 MCG
1000 CAPSULE ORAL DAILY
Qty: 0 | Refills: 0 | Status: DISCONTINUED | OUTPATIENT
Start: 2017-07-30 | End: 2017-08-03

## 2017-07-30 RX ORDER — FERROUS SULFATE 325(65) MG
325 TABLET ORAL DAILY
Qty: 0 | Refills: 0 | Status: DISCONTINUED | OUTPATIENT
Start: 2017-07-30 | End: 2017-08-04

## 2017-07-30 RX ADMIN — Medication 1000 UNIT(S): at 13:30

## 2017-07-30 RX ADMIN — MORPHINE SULFATE 2 MILLIGRAM(S): 50 CAPSULE, EXTENDED RELEASE ORAL at 04:06

## 2017-07-30 RX ADMIN — SODIUM CHLORIDE 1000 MILLILITER(S): 9 INJECTION INTRAMUSCULAR; INTRAVENOUS; SUBCUTANEOUS at 04:06

## 2017-07-30 RX ADMIN — PANTOPRAZOLE SODIUM 40 MILLIGRAM(S): 20 TABLET, DELAYED RELEASE ORAL at 13:29

## 2017-07-30 RX ADMIN — SIMVASTATIN 20 MILLIGRAM(S): 20 TABLET, FILM COATED ORAL at 21:42

## 2017-07-30 RX ADMIN — CEFTRIAXONE 100 GRAM(S): 500 INJECTION, POWDER, FOR SOLUTION INTRAMUSCULAR; INTRAVENOUS at 04:04

## 2017-07-30 RX ADMIN — HEPARIN SODIUM 5000 UNIT(S): 5000 INJECTION INTRAVENOUS; SUBCUTANEOUS at 21:42

## 2017-07-30 RX ADMIN — SODIUM CHLORIDE 60 MILLILITER(S): 9 INJECTION INTRAMUSCULAR; INTRAVENOUS; SUBCUTANEOUS at 13:30

## 2017-07-30 RX ADMIN — MORPHINE SULFATE 2 MILLIGRAM(S): 50 CAPSULE, EXTENDED RELEASE ORAL at 04:02

## 2017-07-30 RX ADMIN — MIRTAZAPINE 30 MILLIGRAM(S): 45 TABLET, ORALLY DISINTEGRATING ORAL at 21:42

## 2017-07-30 RX ADMIN — Medication 50 MILLIGRAM(S): at 17:25

## 2017-07-30 RX ADMIN — Medication 325 MILLIGRAM(S): at 13:30

## 2017-07-30 RX ADMIN — MORPHINE SULFATE 2 MILLIGRAM(S): 50 CAPSULE, EXTENDED RELEASE ORAL at 06:19

## 2017-07-30 RX ADMIN — Medication 650 MILLIGRAM(S): at 04:05

## 2017-07-30 RX ADMIN — MORPHINE SULFATE 2 MILLIGRAM(S): 50 CAPSULE, EXTENDED RELEASE ORAL at 06:18

## 2017-07-30 RX ADMIN — HEPARIN SODIUM 5000 UNIT(S): 5000 INJECTION INTRAVENOUS; SUBCUTANEOUS at 13:29

## 2017-07-30 NOTE — H&P ADULT - ADDITIONAL PE
laceration over back of scalp, no acute bleeding at this time however unable to fully visualize the wound 2/2 to matted scalp hair coated with dry blood

## 2017-07-30 NOTE — ED PROCEDURE NOTE - CPROC ED POST PROC CARE GUIDE1
Verbal/written post procedure instructions were given to patient/caregiver.
Instructed patient/caregiver regarding signs and symptoms of infection./Instructed patient/caregiver to follow-up with primary care physician./Keep the cast/splint/dressing clean and dry./Elevate the injured extremity as instructed./Verbal/written post procedure instructions were given to patient/caregiver.

## 2017-07-30 NOTE — H&P ADULT - NEGATIVE OPHTHALMOLOGIC SYMPTOMS
no discharge R/no lacrimation L/no lacrimation R/no loss of vision R/no scleral injection R/no blurred vision L/no irritation R/no photophobia/no pain R/no loss of vision L/no pain L/no irritation L/no diplopia/no discharge L

## 2017-07-30 NOTE — H&P ADULT - PROBLEM SELECTOR PLAN 2
UA positive, with low grade fever and leucocytosis with left shift, lactate WNL  s/p rocephin and 1L NS  c/w c/w Abx and ICF, check UCx UA positive, with low grade fever and leucocytosis with left shift, lactate WNL  s/p rocephin and 1L NS  c/w c/w Abx and IVF, check UCx

## 2017-07-30 NOTE — H&P ADULT - ATTENDING COMMENTS
patient seen and examined. case fully discussed with  ER attending and medical resident. reviewed chief complaint. reviewed review of systems. reviewed list of medication,  reviewed physical exam. reviewed assessment and plan. agree with full H and P. Will follow up with Orthopedic. DVT prophylaxis. Follow up with ID.

## 2017-07-30 NOTE — H&P ADULT - PROBLEM SELECTOR PLAN 1
s/p mechanical fall after slipping over her urine. head laceration present however no active bleed.  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 head laceration present however no active bleed.  CT head/cervical spine show no fracture or dislocations  Also had wound and bleed from a small wound over the upper, anterior of right tibia, Xray knee/tibia/fibula: Right knee arthroplasty displaced periprosthetic fractures of the distal femur.  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 head laceration present however no active bleed.  CT head/cervical spine show no fracture or dislocations  Also had wound and bleed from a small wound over the upper, anterior of right tibia, Xray knee/tibia/fibula: Right knee arthroplasty displaced periprosthetic fractures of the distal femur.  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 however pending official note at this time. R Knee is braced

## 2017-07-30 NOTE — CONSULT NOTE ADULT - SUBJECTIVE AND OBJECTIVE BOX
this is a 83 y/o F with a history of bilateral total knee replacements 9 years ago who fell coming out of the bathroom. She does not remember when this happened. She state's she hit her RIGHT knee on the floor and the back of her head on the bed. She was unable to get back up. She noticed blood on the floor that was coming from the back of her head. She called her  who subsequently called EMS. She was BIBEMS. No LOC, no headache, no change in vision, no dizziness, no light headed ness, no fever, chills, nausea, or vomiting.   also interviewed, pt. sustaines a head laceration which was treated by ED MD.  pt is s/p kidney removal when she was 11y/o. 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 Surgery was recommended by Dr. Kauffman, however due to poor function the surgery was not performed.    PMH: Dementia, HTN  PSH: as above  NKDA  no smoking, no etoh use.    PE:  Vital Signs Last 24 Hrs  T(C): 36.3 (30 Jul 2017 10:57), Max: 38.1 (30 Jul 2017 02:12)  T(F): 97.3 (30 Jul 2017 10:57), Max: 100.5 (30 Jul 2017 02:12)  HR: 88 (30 Jul 2017 10:57) (88 - 116)  BP: 116/55 (30 Jul 2017 10:57) (112/53 - 179/99)  BP(mean): --  RR: 18 (30 Jul 2017 08:02) (18 - 18)  SpO2: 98% (30 Jul 2017 08:02) (97% - 99%)    AOx3, lying comfortably in bed    RLE: knee pain on ROM, tender joint line, ROM limited due to pain  + draining sinus in the middle of knee incision, calves soft NT, NVI, weak dorsiflexion bilaterally.  LLE: knee with ROM 0-90 without pain, calves soft NT, NVI    < from: Xray Knee 3 Views, Right (07.30.17 @ 08:53) >  FINDINGS:  Right knee arthroplasty is again seen. There is periprosthetic displaced   fracture, with free fracture fragments laterally, medially and   posteriorly around the distal femur. There is significant soft tissue   swelling of the knee and a joint effusion. Proximal tibia appears intact.    IMPRESSION:  Right knee arthroplasty displaced periprosthetic fractures of the distal   femur.    < end of copied text >    A/P: Right knee periprosthetic fracture of distal femur  with chronic right knee infected prosthesis  - case d/w on call ortho MD (Donovan Dexter MD)  - knee immobilizer applied  - non-weight bearing to right leg  - analgesics PRn  - DVT prophylaxis  - will most likely be treated via conservative treatment in a brace, as per husbands hesitating regarding surgical intervention and given patients poor kidney status  - will request 2nd opinion consult from a joint specialist to see the patient HPI: Patient is an 81 y/o F with a history of bilateral total knee replacements 9 years ago who fell coming out of the bathroom. She does not remember when this happened. She state's she hit her RIGHT knee on the floor and the back of her head on the bed. She was unable to get back up. 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 Surgery was recommended by Dr. Kauffman, however due to poor function the surgery was not performed.    PMH: Dementia, HTN  PSH: as above  NKDA  no smoking, no etoh use.    PE:  Vital Signs Last 24 Hrs  T(C): 36.3 (30 Jul 2017 10:57), Max: 38.1 (30 Jul 2017 02:12)  T(F): 97.3 (30 Jul 2017 10:57), Max: 100.5 (30 Jul 2017 02:12)  HR: 88 (30 Jul 2017 10:57) (88 - 116)  BP: 116/55 (30 Jul 2017 10:57) (112/53 - 179/99)  BP(mean): --  RR: 18 (30 Jul 2017 08:02) (18 - 18)  SpO2: 98% (30 Jul 2017 08:02) (97% - 99%)    AOx3, lying comfortably in bed    RLE: Right knee pain on ROM, tender joint line, Mild swelling, ROM limited due to pain  Positive sinus in the middle of knee incision. No drainage noted.  calves soft NT, NVI, weak dorsiflexion bilaterally.  LLE: knee with ROM 0-90 without pain, calves soft NT, NVI    < from: Xray Knee 3 Views, Right (07.30.17 @ 08:53) >  FINDINGS:  Right knee arthroplasty is again seen. There is periprosthetic displaced   fracture, with free fracture fragments laterally, medially and   posteriorly around the distal femur. There is significant soft tissue   swelling of the knee and a joint effusion. Proximal tibia appears intact.    IMPRESSION:  Right knee arthroplasty displaced periprosthetic fractures of the distal   femur.    A/P: Right knee periprosthetic fracture of distal femur  with chronic right knee infected prosthesis    Plan:     Closed treatment of fracture and application of long leg splint/immobilizer.   CT scan of right knee to evaluate extent of the fracture.   - non-weight bearing to right leg  - analgesics PRn  - DVT prophylaxis

## 2017-07-30 NOTE — ED PROCEDURE NOTE - NS ED PERI VASCULAR NEG
no paresthesia/fingers/toes warm to touch/no cyanosis of extremity/capillary refill time < 2 seconds
capillary refill time < 2 seconds/fingers/toes warm to touch/no swelling/no cyanosis of extremity/no paresthesia

## 2017-07-30 NOTE — H&P ADULT - NEGATIVE CARDIOVASCULAR SYMPTOMS
no peripheral edema/no dyspnea on exertion/no claudication/no chest pain/no paroxysmal nocturnal dyspnea/no palpitations/no orthopnea

## 2017-07-30 NOTE — H&P ADULT - PROBLEM SELECTOR PLAN 5
Patient has CKD, and congenital unilateral hypoplastic kidney, its a known Dx and has been following Nephrologist at [facility]  Will send urine lytes, obtain baseline Creatinine  Check a US , Kidney if renal function worsens

## 2017-07-30 NOTE — ED PROCEDURE NOTE - CPROC ED TIME OUT STATEMENT1
“Patient's name, , procedure and correct site were confirmed during the [place] Timeout.”
“Patient's name, , procedure and correct site were confirmed during the [place] Timeout.”

## 2017-07-30 NOTE — H&P ADULT - EXTREMITIES COMMENTS
right knee in knee brace with blood over upper, anterior part of tibia, through a small wound  hemostasis now achieved

## 2017-07-30 NOTE — H&P ADULT - NEUROLOGICAL DETAILS
alert and oriented x 3/responds to pain/sensation intact/deep reflexes intact/responds to verbal commands/cranial nerves intact

## 2017-07-30 NOTE — ED PROCEDURE NOTE - NS ED PERI NEURO NEG
Post-application: Motor, sensory, and vascular responses intact in the injured extremity./Pre-application: Motor, sensory, and vascular responses intact in the injured extremity./The patient/caregiver verbalized understanding of how to care for the injured extremity with splint
Pre-application: Motor, sensory, and vascular responses intact in the injured extremity./Post-application: Motor, sensory, and vascular responses intact in the injured extremity./The patient/caregiver verbalized understanding of how to care for the injured extremity with splint

## 2017-07-30 NOTE — H&P ADULT - RS GEN PE MLT RESP DETAILS PC
no rales/breath sounds equal/good air movement/clear to auscultation bilaterally/respirations non-labored/no rhonchi/no wheezes/no intercostal retractions/no subcutaneous emphysema/airway patent/normal/no chest wall tenderness

## 2017-07-30 NOTE — H&P ADULT - ASSESSMENT
83 y/o female from home, with a PMHx of HTN, HLD and dementia present to ED c/o head laceration and R knee pain s/p slip and fall  Patient has low grade fever of 100.5 in the ED, tachycardiac to 114, BP initially elevated to 170/99, later improved to 130/74 without interventions. Labs significant for leucocytosis to 14.3 with left shift, with anemia, H/H 10.4/32.4. Lee panel WNL except BUN: Creat 45:3.09. CT head and cervical spine, no fracture or dislocation, UA: Mod leuc esterase and blood, 11-25 wbc abd RBC, mod bacteria, hyaline cast present. 83 y/o female from home, with a PMHx of HTN, HLD and dementia present to ED c/o head laceration and R knee pain s/p slip and fall  Patient has low grade fever of 100.5 in the ED, tachycardiac to 114, BP initially elevated to 170/99, later improved to 130/74 without interventions. Labs significant for leucocytosis to 14.3 with left shift, with anemia, H/H 10.4/32.4. Lee panel WNL except BUN: Creat 45:3.09. CT head and cervical spine, no fracture or dislocation, UA: Mod leuc esterase and blood, 11-25 wbc abd RBC, mod bacteria, hyaline cast present.  CXR: No pneumothorax, 2 small nodular opacities projecting over the right lung base measuring   up to 7 mm. Further evaluation with chest CT is suggested as clinically   warranted.

## 2017-07-30 NOTE — H&P ADULT - NEGATIVE GENERAL SYMPTOMS
no weight loss/no sweating/no anorexia/no weight gain/no polydipsia/no chills/no polyphagia/no malaise/no fatigue/no polyuria

## 2017-07-30 NOTE — ED ADULT NURSE REASSESSMENT NOTE - NS ED NURSE REASSESS COMMENT FT1
Report received from SHEYLA Dawson RN. Patient resting in bed. Awake. Breathing on room air. On right long leg immobilizer.

## 2017-07-30 NOTE — H&P ADULT - NEGATIVE SKIN SYMPTOMS
no dryness/no brittle nails/no rash/no pitted nails/no hair loss/no change in size/color of mole/no tumor/no itching

## 2017-07-30 NOTE — H&P ADULT - HISTORY OF PRESENT ILLNESS
81 y/o female from home, with a PMHx of HTN, HLD and dementia present to ED c/o head laceration and R knee pain s/p slip and fall.  notes the pt used to self catheter, however patient now with roque cath in place ( indication of roque unknown however when asked, thae patient gives a vague Hx and says that she had a RTA 3 years ago, when she was injured and since then has a been self catheterizing, ? neurogenuc bladder ).  notes the pt bag from roque was lost in the morning, but cath was still in place.Cath bag was last changed 15 days ago. patient reports the she slipped on her urine and hit the back of her head causing bleeding, patient denies any palpitaions, sweating, headaches or other prodormal symptoms before she fell, however admits that she also fell 10 days ago and at that time she felt dizzy. patient unable to explain clearly what happened when she fell, however denies LOC  post fall but says she hit her head and noticed blood after which her  came in right after and called EMS.Cath bag was last changed 15 days ago. As per ED provider note,  also reports a chronic wound over the right lower anterior knee that constantly oozes, however patient denies that and says that she has b/l TKR 9 years ago, but has no problems with either knee. Patient unable to give a good history. She has a laceration over back of her scalp, with matted hair 2/2 to dry blood over it and hence laceration site, unable to visualize. When asked about any H/O kidney ds, patient has that her one kidney is smaller than the other, but unsure about h/o CKD. ROS negative except above  NKDA. never smoker or drinker, FHx of DM in mother. SHx: b/l TKR 81 y/o female from home, with a PMHx of HTN, HLD and dementia present to ED c/o head laceration and R knee pain s/p slip and fall.  notes the pt used to self catheter, however patient now with roque cath in place as patient has dementia and unable to self cath ( indication of roque unknown however when asked, bruno patient gives a vague Hx and says that she had a RTA 3 years ago, when she was injured and since then has a been self catheterizing, ? neurogenic bladder ).  notes the pt bag from roque was lost in the morning, but cath was still in place.Cath bag was last changed 15 days ago. patient reports the she slipped on her urine and hit the back of her head causing bleeding, patient denies any palpitaions, sweating, headaches or other prodormal symptoms before she fell. Patient however denies LOC  post fall but says she hit her head and noticed blood after which her  came in right after and called EMS. However information was later obtained from  who affirmed that patient had a mechanical fall from slipping because of urine on the floor.Cath bag was last changed 15 days ago.  also reports a chronic wound over the right lower anterior knee that constantly oozes. Patient has a H/O b/l TKR and has been following Dr Kauffman at [facility] as there has been infection of the R knee prosthesis that has been oozing on and off and plan was to replace prosthesis.  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 She also has anemia of CKD and Hb of 10 is baseline per . patient sees Dr Pinedo for procrit shots every 2 weeks.  She has a laceration over back of her scalp, with matted hair 2/2 to dry blood over it and hence laceration site, unable to visualize. ROS negative except above  NKDA. never smoker or drinker, FHx of DM in mother. SHx: b/l TKR  NKDA. never smoker or drinker, FHx of DM in mother. SHx: b/l TKR 83 y/o female from home, with a PMHx of HTN, HLD and dementia present to ED c/o head laceration and R knee pain s/p slip and fall.  notes the pt used to self catheter, however patient now with roque cath in place as patient has dementia and unable to self cath ( indication of roque unknown however when asked, bruno patient gives a vague Hx and says that she had a RTA 3 years ago, when she was injured and since then has a been self catheterizing, ? neurogenic bladder ).  notes the pt bag from roque was lost in the morning, but cath was still in place.Cath bag was last changed 15 days ago. patient reports the she slipped on her urine and hit the back of her head causing bleeding, patient denies any palpitaions, sweating, headaches or other prodormal symptoms before she fell. Patient also denies LOC  post fall but says she hit her head and noticed blood after which her  came in right after and called EMS. However information was later obtained from  who affirmed that patient had a mechanical fall from slipping because of urine on the floor.  also reports a chronic wound over the right lower anterior knee that constantly oozes. Patient has a H/O b/l TKR and has been following Dr Kauffman at [facility] as there has been infection of the R knee prosthesis that has been oozing on and off and plan was to replace prosthesis.  also confirms that patient always has an elevated creatinine 2/2 to congenital hypoplastic unilateral kidney and follws Dr Pinedo, Nephrologist at [facility] for the same. She also has anemia of CKD and Hb of 10 is baseline per . patient sees Dr Pinedo for procrit shots every 2 weeks.  She has a laceration over back of her scalp, with matted hair 2/2 to dry blood over it and hence laceration site, unable to visualize. ROS negative except above  NKDA. never smoker or drinker, FHx of DM in mother. SHx: b/l TKR  NKDA. never smoker or drinker, FHx of DM in mother. SHx: b/l TKR

## 2017-07-31 LAB
24R-OH-CALCIDIOL SERPL-MCNC: 116 NG/ML — HIGH (ref 30–100)
ANION GAP SERPL CALC-SCNC: 7 MMOL/L — SIGNIFICANT CHANGE UP (ref 5–17)
BUN SERPL-MCNC: 44 MG/DL — HIGH (ref 7–18)
CALCIUM SERPL-MCNC: 9.3 MG/DL — SIGNIFICANT CHANGE UP (ref 8.4–10.5)
CHLORIDE SERPL-SCNC: 109 MMOL/L — HIGH (ref 96–108)
CO2 SERPL-SCNC: 23 MMOL/L — SIGNIFICANT CHANGE UP (ref 22–31)
CREAT SERPL-MCNC: 2.99 MG/DL — HIGH (ref 0.5–1.3)
GLUCOSE SERPL-MCNC: 98 MG/DL — SIGNIFICANT CHANGE UP (ref 70–99)
HCT VFR BLD CALC: 25.5 % — LOW (ref 34.5–45)
HGB BLD-MCNC: 8 G/DL — LOW (ref 11.5–15.5)
MAGNESIUM SERPL-MCNC: 2.4 MG/DL — SIGNIFICANT CHANGE UP (ref 1.6–2.6)
MCHC RBC-ENTMCNC: 29.6 PG — SIGNIFICANT CHANGE UP (ref 27–34)
MCHC RBC-ENTMCNC: 31.3 GM/DL — LOW (ref 32–36)
MCV RBC AUTO: 94.5 FL — SIGNIFICANT CHANGE UP (ref 80–100)
PHOSPHATE SERPL-MCNC: 2.9 MG/DL — SIGNIFICANT CHANGE UP (ref 2.5–4.5)
PLATELET # BLD AUTO: 220 K/UL — SIGNIFICANT CHANGE UP (ref 150–400)
POTASSIUM SERPL-MCNC: 4.6 MMOL/L — SIGNIFICANT CHANGE UP (ref 3.5–5.3)
POTASSIUM SERPL-SCNC: 4.6 MMOL/L — SIGNIFICANT CHANGE UP (ref 3.5–5.3)
RBC # BLD: 2.7 M/UL — LOW (ref 3.8–5.2)
RBC # FLD: 15.1 % — HIGH (ref 10.3–14.5)
SODIUM SERPL-SCNC: 139 MMOL/L — SIGNIFICANT CHANGE UP (ref 135–145)
TRANSFERRIN SERPL-MCNC: 159 MG/DL — LOW (ref 200–360)
WBC # BLD: 9.1 K/UL — SIGNIFICANT CHANGE UP (ref 3.8–10.5)
WBC # FLD AUTO: 9.1 K/UL — SIGNIFICANT CHANGE UP (ref 3.8–10.5)

## 2017-07-31 RX ADMIN — TRAMADOL HYDROCHLORIDE 25 MILLIGRAM(S): 50 TABLET ORAL at 00:07

## 2017-07-31 RX ADMIN — HEPARIN SODIUM 5000 UNIT(S): 5000 INJECTION INTRAVENOUS; SUBCUTANEOUS at 06:52

## 2017-07-31 RX ADMIN — Medication 1000 UNIT(S): at 13:32

## 2017-07-31 RX ADMIN — TRAMADOL HYDROCHLORIDE 25 MILLIGRAM(S): 50 TABLET ORAL at 00:48

## 2017-07-31 RX ADMIN — Medication 50 MILLIGRAM(S): at 06:52

## 2017-07-31 RX ADMIN — Medication 325 MILLIGRAM(S): at 13:14

## 2017-07-31 RX ADMIN — PANTOPRAZOLE SODIUM 40 MILLIGRAM(S): 20 TABLET, DELAYED RELEASE ORAL at 06:52

## 2017-07-31 RX ADMIN — Medication 50 MILLIGRAM(S): at 17:47

## 2017-07-31 RX ADMIN — HEPARIN SODIUM 5000 UNIT(S): 5000 INJECTION INTRAVENOUS; SUBCUTANEOUS at 21:57

## 2017-07-31 RX ADMIN — CEFTRIAXONE 100 GRAM(S): 500 INJECTION, POWDER, FOR SOLUTION INTRAMUSCULAR; INTRAVENOUS at 06:52

## 2017-07-31 RX ADMIN — HEPARIN SODIUM 5000 UNIT(S): 5000 INJECTION INTRAVENOUS; SUBCUTANEOUS at 13:14

## 2017-07-31 RX ADMIN — SIMVASTATIN 20 MILLIGRAM(S): 20 TABLET, FILM COATED ORAL at 21:57

## 2017-07-31 RX ADMIN — MIRTAZAPINE 30 MILLIGRAM(S): 45 TABLET, ORALLY DISINTEGRATING ORAL at 21:57

## 2017-07-31 NOTE — PROGRESS NOTE ADULT - PROBLEM SELECTOR PLAN 5
Patient has CKD, and congenital unilateral hypoplastic kidney, its a known Dx and has been following Nephrologist at [facility]  Will send urine lytes, obtain baseline Creatinine  Will consider US Kidney if renal function worsens

## 2017-07-31 NOTE — CONSULT NOTE ADULT - CONSULT REASON
-recurrent Urinary Tract infection due to neurogenic bladder.  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

## 2017-07-31 NOTE — CONSULT NOTE ADULT - ASSESSMENT
82 y old female with PMH of bilateral knee replacement and recurrent UTIs secondary to neurogenic bladder and chronic Chris's catheter in place. Patient came in with status post fall and fracture of right distal female. Patient had her knee replacement a couple of years ago. Patient hit her knee 18 months ago as per daughter and developed a fluid fill lesion at the end of scar from right knee replacement incision. on examination knee was covered with brace and abscess was covered with gauze bandage. we were able to obtain images from daughter the first time patient had the lesion. It was a fluid filled dark lesion with redness in the center. Patient got it drained from out side facility and it healed with help of antibiotics. Patient started having the same complaint again after few weeks. The lesion drains white fluid and pus from it. patient's orthopedic surgeon suspects that this is may be due to patient's infected prosthetic joint on right side. Patient is been on multiple antibiotics since then. Patient had low grade fever this morning. denied any pain in the right knee. patient's urine looked cloudy. She denied any burning, frequency and urgency in urine. Blood cultures, Urine cultures are still testing.   Patient currently on Rocephin UTI  chronic right knee prosthesis infection  leukocytosis - has normalised  low grade fever only  plan - cont rocephin 1 gm iv qd for uti.  I went through all the options with the  and daughter for the pt regarding her infected prosthesis which include removing prosthesis and rxing with iv abxs then replacing it, removing prosthesis and fusing joint, vs chronic suppressive therapy vs treating acute flares of disease.  family has opted to treat acute flares of disease at this time.

## 2017-07-31 NOTE — PHYSICAL THERAPY INITIAL EVALUATION ADULT - CRITERIA FOR SKILLED THERAPEUTIC INTERVENTIONS
predicted duration of therapy intervention/risk reduction/prevention/anticipated discharge recommendation/rehab potential/therapy frequency/functional limitations in following categories/impairments found

## 2017-07-31 NOTE — PHYSICAL THERAPY INITIAL EVALUATION ADULT - TRANSFER SAFETY CONCERNS NOTED: SIT/STAND, REHAB EVAL
decreased weight-shifting ability/losing balance/inability to maintain weight-bearing restrictions w/o assist

## 2017-07-31 NOTE — PHYSICAL THERAPY INITIAL EVALUATION ADULT - ACTIVE RANGE OF MOTION EXAMINATION, REHAB EVAL
Rt. hip 1/5 range, Rt. knee not assessed as immobilized, and Rt. ankle 1/5 range/Left LE Active ROM was WFL (within functional limits)/arielle. upper extremity Active ROM was WNL (within normal limits)

## 2017-07-31 NOTE — CONSULT NOTE ADULT - SKIN COMMENTS
right knee is swollen and warm but no erythema or drainage at this time, small wound overlying right knee.

## 2017-07-31 NOTE — PHYSICAL THERAPY INITIAL EVALUATION ADULT - LEVEL OF INDEPENDENCE: BED TO CHAIR, REHAB EVAL
attempted but unable to perfrom secondary to pt. pain and severe fear of falling. Will reattempt at future session.

## 2017-07-31 NOTE — PHYSICAL THERAPY INITIAL EVALUATION ADULT - GENERAL OBSERVATIONS, REHAB EVAL
Consult received, chart reviewed. Patient received supine in bed, NAD, +IV, +roque. Knee immobilizer off, bandage at knee c/d/i, Rt. immobilizer placed on by PT. Patient agreed to EVALUATION from Physical Therapist.

## 2017-07-31 NOTE — PROGRESS NOTE ADULT - PROBLEM SELECTOR PLAN 1
Stable  CT head/cervical spine: no fracture or dislocations  Xray knee/tibia/fibula: Right knee arthroplasty displaced periprosthetic fractures of the distal femur.  As per , patient is s/p b/l TKR and has an infected prosthesis on the right, and has chronic discharge, follows Orthopedic Doc at [facility] for the same   CRP 6.9  Spoke with Ortho PA today - Ortho will examine pt this afternoon/evening.  Admitting team d/w Ortho Amando TSANG over phone who said most likely patient will need conservative management with f/up with her own orthopedic doctor as an out patient. however pending official note at this time. R Knee is braced Stable  CT head/cervical spine: no fracture or dislocations  Xray knee/tibia/fibula: Right knee arthroplasty displaced periprosthetic fractures of the distal femur.  As per , patient is s/p b/l TKR and has an infected prosthesis on the right, and has chronic discharge, follows Orthopedic Doc at [facility] for the same   CRP 6.9  Spoke with Ortho PA today - Ortho will examine pt this afternoon/evening.  Admitting team d/w Ortho Amando TSANG over phone who said most likely patient will need conservative management with f/up with her own orthopedic doctor as an out patient. however pending official note at this time. R Knee is braced  PT rec JONATHAN

## 2017-07-31 NOTE — PROGRESS NOTE ADULT - ASSESSMENT
82 F from home w/ PMHx of HTN, HLD and dementia  c/o head laceration and R knee pain s/p slip and fall. Pt was admitted for mechanical fall and UTI.

## 2017-07-31 NOTE — PROGRESS NOTE ADULT - SUBJECTIVE AND OBJECTIVE BOX
PGY 1 Note discussed with supervising resident and primary attending    Patient is a 82y old  Female who presents with a chief complaint of fall, head laceration, UTI, NELL (2017 08:47)      INTERVAL HPI/OVERNIGHT EVENTS: Pt was seen and examined at bedside. Pt offers no new complaints; current symptoms stable.    MEDICATIONS  (STANDING):  sodium chloride 0.9%. 1000 milliLiter(s) (60 mL/Hr) IV Continuous <Continuous>  heparin  Injectable 5000 Unit(s) SubCutaneous every 8 hours  mirtazapine 30 milliGRAM(s) Oral at bedtime  simvastatin 20 milliGRAM(s) Oral at bedtime  metoprolol 50 milliGRAM(s) Oral two times a day  pantoprazole    Tablet 40 milliGRAM(s) Oral before breakfast  cholecalciferol 1000 Unit(s) Oral daily  ferrous    sulfate 325 milliGRAM(s) Oral daily  cefTRIAXone   IVPB 1 Gram(s) IV Intermittent every 24 hours    MEDICATIONS  (PRN):  acetaminophen   Tablet 650 milliGRAM(s) Oral every 6 hours PRN For Temp greater than 38 C (100.4 F)  acetaminophen   Tablet. 650 milliGRAM(s) Oral every 6 hours PRN Mild Pain (1 - 3)  traMADol 25 milliGRAM(s) Oral every 8 hours PRN Moderate Pain (4 - 6)  morphine  - Injectable 2 milliGRAM(s) IV Push every 8 hours PRN Severe Pain (7 - 10)      __________________________________________________  REVIEW OF SYSTEMS:    CONSTITUTIONAL: No fever, chills, night sweats  EYES: no acute visual disturbances  NECK: No pain or stiffness  RESPIRATORY: No cough; No shortness of breath  CARDIOVASCULAR: No chest pain, no palpitations  GASTROINTESTINAL: No pain. No nausea or vomiting; No diarrhea  GENITOURINARY: no dysuria, no frequency, no hesitancy      Vital Signs Last 24 Hrs  T(C): 36.9 (2017 15:55), Max: 37.9 (2017 08:28)  T(F): 98.5 (2017 15:55), Max: 100.3 (2017 08:28)  HR: 93 (2017 15:55) (89 - 114)  BP: 132/68 (2017 15:55) (124/48 - 150/76)  BP(mean): --  RR: 17 (2017 15:55) (16 - 17)  SpO2: 98% (2017 15:55) (96% - 99%)    ________________________________________________  PHYSICAL EXAM:  GENERAL: NAD. Well appearing, well nourished. Normal mood & affect.   HEENT: Normocephalic;  conjunctivae and sclerae clear; moist mucous membranes;   NECK : supple  CHEST/LUNG: Clear to auscultation bilaterally with good air entry   HEART: S1 S2  regular; no murmurs, gallops or rubs  ABDOMEN: Soft, Nontender, Nondistended; Bowel sounds present  EXTREMITIES: no cyanosis; no edema; no calf tenderness; peripheral pulses intact  SKIN: warm and dry; no rash  NERVOUS SYSTEM:  Awake and alert; Oriented  to place, person and time ; no new deficits  : Chris in place, cloudy urine    _________________________________________________  LABS:                        8.0    9.1   )-----------( 220      ( 2017 08:05 )             25.5     07-31    139  |  109<H>  |  44<H>  ----------------------------<  98  4.6   |  23  |  2.99<H>    Ca    9.3      2017 08:05  Phos  2.9     07-31  Mg     2.4     07-31    TPro  8.7<H>  /  Alb  2.9<L>  /  TBili  0.4  /  DBili  x   /  AST  16  /  ALT  20  /  AlkPhos  77  07-30    PT/INR - ( 2017 12:34 )   PT: 11.8 sec;   INR: 1.08 ratio         PTT - ( 2017 12:34 )  PTT:32.3 sec  Urinalysis Basic - ( 2017 02:40 )    Color: Yellow / Appearance: Clear / S.010 / pH: x  Gluc: x / Ketone: Negative  / Bili: Negative / Urobili: Negative   Blood: x / Protein: 100 / Nitrite: Negative   Leuk Esterase: Moderate / RBC: 10-25 /HPF / WBC 11-25 /HPF   Sq Epi: x / Non Sq Epi: Few / Bacteria: Moderate /HPF        Consultant(s) Notes Reviewed:   YES    Care Discussed with PGY-2/Attending/Consultants :  YES    Plan of care was discussed with patient and /or primary care giver; all questions and concerns were addressed and care was aligned with patient's wishes.

## 2017-07-31 NOTE — PROGRESS NOTE ADULT - PROBLEM SELECTOR PLAN 2
ID service Dr. Posadas following pt. Appreciate advice.  UA +  c/w IV rocephin 1g daily  UCx E Coli and Citrobacter 50-90k ID service Dr. Posadas following pt. Appreciate advice.  UA +; Per pt, she straight caths herself at home but has been unable to do so recently  c/w IV rocephin 1g daily  UCx E Coli and Citrobacter 50-90k

## 2017-07-31 NOTE — PHYSICAL THERAPY INITIAL EVALUATION ADULT - PLANNED THERAPY INTERVENTIONS, PT EVAL
postural re-education/gait training/bed mobility training/ROM/strengthening/balance training/transfer training

## 2017-07-31 NOTE — PHYSICAL THERAPY INITIAL EVALUATION ADULT - PASSIVE RANGE OF MOTION EXAMINATION, REHAB EVAL
Rt. hip WFL, Rt. knee not assessed as immobilized, and Rt. ankle WFL/bilateral upper extremity Passive ROM was WNL (within normal limits)/Left LE Passive ROM was WFL (within functional limits)

## 2017-07-31 NOTE — CONSULT NOTE ADULT - SUBJECTIVE AND OBJECTIVE BOX
HPI:  83 y/o female from home, with a PMHx of HTN, HLD and dementia present to ED c/o head laceration and R knee pain s/p slip and fall.  notes the pt used to self catheter, however patient now with roque cath in place as patient has dementia and unable to self cath ( indication of roque unknown however when asked, cesiae patient gives a vague Hx and says that she had a RTA 3 years ago, when she was injured and since then has a been self catheterizing, ? neurogenic bladder ).  notes the pt bag from roque was lost in the morning, but cath was still in place.Cath bag was last changed 15 days ago. patient reports the she slipped on her urine and hit the back of her head causing bleeding, patient denies any palpitaions, sweating, headaches or other prodormal symptoms before she fell. Patient also denies LOC  post fall but says she hit her head and noticed blood after which her  came in right after and called EMS. However information was later obtained from  who affirmed that patient had a mechanical fall from slipping because of urine on the floor.  also reports a chronic wound over the right lower anterior knee that constantly oozes. 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 She also has anemia of CKD and Hb of 10 is baseline per . patient sees Dr Pinedo for procrit shots every 2 weeks.  She has a laceration over back of her scalp, with matted hair 2/2 to dry blood over it and hence laceration site, unable to visualize. ROS negative except above  NKDA. never smoker or drinker, FHx of DM in mother. SHx: b/l TKR  NKDA. never smoker or drinker, FHx of DM in mother. SHx: b/l TKR (2017 08:47)      PAST MEDICAL & SURGICAL HISTORY:  Dementia  No significant past surgical history      No Known Allergies      Meds:  acetaminophen   Tablet 650 milliGRAM(s) Oral every 6 hours PRN  acetaminophen   Tablet. 650 milliGRAM(s) Oral every 6 hours PRN  traMADol 25 milliGRAM(s) Oral every 8 hours PRN  morphine  - Injectable 2 milliGRAM(s) IV Push every 8 hours PRN  sodium chloride 0.9%. 1000 milliLiter(s) IV Continuous <Continuous>  heparin  Injectable 5000 Unit(s) SubCutaneous every 8 hours  mirtazapine 30 milliGRAM(s) Oral at bedtime  simvastatin 20 milliGRAM(s) Oral at bedtime  metoprolol 50 milliGRAM(s) Oral two times a day  pantoprazole    Tablet 40 milliGRAM(s) Oral before breakfast  cholecalciferol 1000 Unit(s) Oral daily  ferrous    sulfate 325 milliGRAM(s) Oral daily  cefTRIAXone   IVPB 1 Gram(s) IV Intermittent every 24 hours      SOCIAL HISTORY:  Smoker:  YES / NO        PACK YEARS:                         WHEN QUIT?  ETOH use:  YES / NO               FREQUENCY / QUANTITY:  Ilicit Drug use:  YES / NO  Occupation:  Assisted device use (Cane / Walker):  Live with:    FAMILY HISTORY:      VITALS:  Vital Signs Last 24 Hrs  T(C): 37.9 (2017 08:28), Max: 37.9 (2017 08:28)  T(F): 100.3 (2017 08:28), Max: 100.3 (2017 08:28)  HR: 89 (2017 10:03) (86 - 114)  BP: 124/48 (2017 10:03) (114/62 - 150/76)  BP(mean): --  RR: 16 (2017 08:28) (16 - 18)  SpO2: 99% (2017 10:03) (96% - 99%)    LABS/DIAGNOSTIC TESTS:                          8.0    9.1   )-----------( 220      ( 2017 08:05 )             25.5     WBC Count: 9.1 K/uL ( @ 08:05)  WBC Count: 11.1 K/uL ( @ 12:34)  WBC Count: 14.3 K/uL ( @ 01:49)          139  |  109<H>  |  44<H>  ----------------------------<  98  4.6   |  23  |  2.99<H>    Ca    9.3      2017 08:05  Phos  2.9     07-  Mg     2.4     07-31    TPro  8.7<H>  /  Alb  2.9<L>  /  TBili  0.4  /  DBili  x   /  AST  16  /  ALT  20  /  AlkPhos  77  07-30      Urinalysis Basic - ( 2017 02:40 )    Color: Yellow / Appearance: Clear / S.010 / pH: x  Gluc: x / Ketone: Negative  / Bili: Negative / Urobili: Negative   Blood: x / Protein: 100 / Nitrite: Negative   Leuk Esterase: Moderate / RBC: 10-25 /HPF / WBC 11-25 /HPF   Sq Epi: x / Non Sq Epi: Few / Bacteria: Moderate /HPF        LIVER FUNCTIONS - ( 2017 01:49 )  Alb: 2.9 g/dL / Pro: 8.7 g/dL / ALK PHOS: 77 U/L / ALT: 20 U/L DA / AST: 16 U/L / GGT: x             PT/INR - ( 2017 12:34 )   PT: 11.8 sec;   INR: 1.08 ratio         PTT - ( 2017 12:34 )  PTT:32.3 sec      CULTURES: Blood culture testing  urine culture testing      RADIOLOGY:    Xray Knee 3 Views, Right   Right knee arthroplasty is again seen. There is periprosthetic displaced   fracture, with free fracture fragments laterally, medially and   posteriorly around the distal femur. There is significant soft tissue   swelling of the knee and a joint effusion. Proximal tibia appears intact.    IMPRESSION:  Right knee arthroplasty displaced periprosthetic fractures of the distal   femur.      Xray Chest 1 View AP-PORTABLE IMMEDIATE   FINDINGS:  No focal lung consolidation.  No pneumothorax or pleural effusion.   The cardiac silhouette is not accurately assessed by AP technique. Aortic   calcifications.  Degenerative changes of both shoulders.  Partially visualized lumbar spinal fusion hardware.    IMPRESSION:  No pneumothorax.                ROS  [  ] UNABLE TO ELICIT HPI:  83 y/o female from home, with a PMHx of HTN, HLD and dementia present to ED c/o head laceration and R knee pain s/p slip and fall.  notes the pt used to self catheter, however patient now with roque cath in place as patient has dementia and unable to self cath ( indication of roque unknown however when asked,  ? neurogenic bladder ).roque was last changed 10 days ago and again on admission here. patient reports the she slipped on her urine and hit the back of her head causing bleeding, patient denies any palpitaions, sweating, headaches or other prodormal symptoms before she fell. Patient also denies LOC  post fall. However information was later obtained from  who affirmed that patient had a mechanical fall from slipping because of urine on the floor.  also reports a chronic wound over the right lower anterior knee that constantly oozes. 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  also confirms that patient always has an elevated creatinine 2/2 to congenital hypoplastic unilateral kidney and follws Dr Pinedo, Nephrologist at [facility] for the same. She also has anemia of CKD and Hb of 10 is baseline per . patient sees Dr Pinedo for procrit shots every 2 weeks.   ROS negative except above  NKDA. never smoker or drinker, FHx of DM in mother.         PAST MEDICAL & SURGICAL HISTORY:  Dementia   past surgical history  bilat knee replacements      No Known Allergies      Meds:  acetaminophen   Tablet 650 milliGRAM(s) Oral every 6 hours PRN  acetaminophen   Tablet. 650 milliGRAM(s) Oral every 6 hours PRN  traMADol 25 milliGRAM(s) Oral every 8 hours PRN  morphine  - Injectable 2 milliGRAM(s) IV Push every 8 hours PRN  sodium chloride 0.9%. 1000 milliLiter(s) IV Continuous <Continuous>  heparin  Injectable 5000 Unit(s) SubCutaneous every 8 hours  mirtazapine 30 milliGRAM(s) Oral at bedtime  simvastatin 20 milliGRAM(s) Oral at bedtime  metoprolol 50 milliGRAM(s) Oral two times a day  pantoprazole    Tablet 40 milliGRAM(s) Oral before breakfast  cholecalciferol 1000 Unit(s) Oral daily  ferrous    sulfate 325 milliGRAM(s) Oral daily  cefTRIAXone   IVPB 1 Gram(s) IV Intermittent every 24 hours      SOCIAL HISTORY: as above      FAMILY HISTORY: as above      VITALS:  Vital Signs Last 24 Hrs  T(C): 37.9 (2017 08:28), Max: 37.9 (2017 08:28)  T(F): 100.3 (2017 08:28), Max: 100.3 (2017 08:28)  HR: 89 (2017 10:03) (86 - 114)  BP: 124/48 (2017 10:03) (114/62 - 150/76)  BP(mean): --  RR: 16 (2017 08:28) (16 - 18)  SpO2: 99% (2017 10:03) (96% - 99%)    LABS/DIAGNOSTIC TESTS:                          8.0    9.1   )-----------( 220      ( 2017 08:05 )             25.5     WBC Count: 9.1 K/uL ( @ 08:05)  WBC Count: 11.1 K/uL ( @ 12:34)  WBC Count: 14.3 K/uL ( @ 01:49)          139  |  109<H>  |  44<H>  ----------------------------<  98  4.6   |  23  |  2.99<H>    Ca    9.3      2017 08:05  Phos  2.9       Mg     2.4         TPro  8.7<H>  /  Alb  2.9<L>  /  TBili  0.4  /  DBili  x   /  AST  16  /  ALT  20  /  AlkPhos  77  30      Urinalysis Basic - ( 2017 02:40 )    Color: Yellow / Appearance: Clear / S.010 / pH: x  Gluc: x / Ketone: Negative  / Bili: Negative / Urobili: Negative   Blood: x / Protein: 100 / Nitrite: Negative   Leuk Esterase: Moderate / RBC: 10-25 /HPF / WBC 11-25 /HPF   Sq Epi: x / Non Sq Epi: Few / Bacteria: Moderate /HPF        LIVER FUNCTIONS - ( 2017 01:49 )  Alb: 2.9 g/dL / Pro: 8.7 g/dL / ALK PHOS: 77 U/L / ALT: 20 U/L DA / AST: 16 U/L / GGT: x             PT/INR - ( 2017 12:34 )   PT: 11.8 sec;   INR: 1.08 ratio         PTT - ( 2017 12:34 )  PTT:32.3 sec      CULTURES: Blood culture testing  urine culture testing      RADIOLOGY:    Xray Knee 3 Views, Right   Right knee arthroplasty is again seen. There is periprosthetic displaced   fracture, with free fracture fragments laterally, medially and   posteriorly around the distal femur. There is significant soft tissue   swelling of the knee and a joint effusion. Proximal tibia appears intact.    IMPRESSION:  Right knee arthroplasty displaced periprosthetic fractures of the distal   femur.      Xray Chest 1 View AP-PORTABLE IMMEDIATE   FINDINGS:  No focal lung consolidation.  No pneumothorax or pleural effusion.   The cardiac silhouette is not accurately assessed by AP technique. Aortic   calcifications.  Degenerative changes of both shoulders.  Partially visualized lumbar spinal fusion hardware.    IMPRESSION:  No pneumothorax.                ROS  [  ] UNABLE TO ELICIT

## 2017-08-01 LAB
-  AMIKACIN: SIGNIFICANT CHANGE UP
-  AMIKACIN: SIGNIFICANT CHANGE UP
-  AMPICILLIN/SULBACTAM: SIGNIFICANT CHANGE UP
-  AMPICILLIN/SULBACTAM: SIGNIFICANT CHANGE UP
-  AMPICILLIN: SIGNIFICANT CHANGE UP
-  AMPICILLIN: SIGNIFICANT CHANGE UP
-  AZTREONAM: SIGNIFICANT CHANGE UP
-  AZTREONAM: SIGNIFICANT CHANGE UP
-  CEFAZOLIN: SIGNIFICANT CHANGE UP
-  CEFAZOLIN: SIGNIFICANT CHANGE UP
-  CEFEPIME: SIGNIFICANT CHANGE UP
-  CEFEPIME: SIGNIFICANT CHANGE UP
-  CEFOXITIN: SIGNIFICANT CHANGE UP
-  CEFOXITIN: SIGNIFICANT CHANGE UP
-  CEFTAZIDIME: SIGNIFICANT CHANGE UP
-  CEFTAZIDIME: SIGNIFICANT CHANGE UP
-  CEFTRIAXONE: SIGNIFICANT CHANGE UP
-  CEFTRIAXONE: SIGNIFICANT CHANGE UP
-  CIPROFLOXACIN: SIGNIFICANT CHANGE UP
-  CIPROFLOXACIN: SIGNIFICANT CHANGE UP
-  ERTAPENEM: SIGNIFICANT CHANGE UP
-  ERTAPENEM: SIGNIFICANT CHANGE UP
-  GENTAMICIN: SIGNIFICANT CHANGE UP
-  GENTAMICIN: SIGNIFICANT CHANGE UP
-  IMIPENEM: SIGNIFICANT CHANGE UP
-  IMIPENEM: SIGNIFICANT CHANGE UP
-  LEVOFLOXACIN: SIGNIFICANT CHANGE UP
-  LEVOFLOXACIN: SIGNIFICANT CHANGE UP
-  MEROPENEM: SIGNIFICANT CHANGE UP
-  MEROPENEM: SIGNIFICANT CHANGE UP
-  NITROFURANTOIN: SIGNIFICANT CHANGE UP
-  NITROFURANTOIN: SIGNIFICANT CHANGE UP
-  PIPERACILLIN/TAZOBACTAM: SIGNIFICANT CHANGE UP
-  PIPERACILLIN/TAZOBACTAM: SIGNIFICANT CHANGE UP
-  TOBRAMYCIN: SIGNIFICANT CHANGE UP
-  TOBRAMYCIN: SIGNIFICANT CHANGE UP
-  TRIMETHOPRIM/SULFAMETHOXAZOLE: SIGNIFICANT CHANGE UP
-  TRIMETHOPRIM/SULFAMETHOXAZOLE: SIGNIFICANT CHANGE UP
ANION GAP SERPL CALC-SCNC: 7 MMOL/L — SIGNIFICANT CHANGE UP (ref 5–17)
BUN SERPL-MCNC: 41 MG/DL — HIGH (ref 7–18)
CALCIUM SERPL-MCNC: 9 MG/DL — SIGNIFICANT CHANGE UP (ref 8.4–10.5)
CHLORIDE SERPL-SCNC: 109 MMOL/L — HIGH (ref 96–108)
CO2 SERPL-SCNC: 24 MMOL/L — SIGNIFICANT CHANGE UP (ref 22–31)
CREAT SERPL-MCNC: 2.96 MG/DL — HIGH (ref 0.5–1.3)
CULTURE RESULTS: SIGNIFICANT CHANGE UP
GLUCOSE SERPL-MCNC: 86 MG/DL — SIGNIFICANT CHANGE UP (ref 70–99)
HCT VFR BLD CALC: 25.4 % — LOW (ref 34.5–45)
HGB BLD-MCNC: 8.3 G/DL — LOW (ref 11.5–15.5)
MCHC RBC-ENTMCNC: 30.6 PG — SIGNIFICANT CHANGE UP (ref 27–34)
MCHC RBC-ENTMCNC: 32.6 GM/DL — SIGNIFICANT CHANGE UP (ref 32–36)
MCV RBC AUTO: 94 FL — SIGNIFICANT CHANGE UP (ref 80–100)
METHOD TYPE: SIGNIFICANT CHANGE UP
METHOD TYPE: SIGNIFICANT CHANGE UP
ORGANISM # SPEC MICROSCOPIC CNT: SIGNIFICANT CHANGE UP
PLATELET # BLD AUTO: 215 K/UL — SIGNIFICANT CHANGE UP (ref 150–400)
POTASSIUM SERPL-MCNC: 4.4 MMOL/L — SIGNIFICANT CHANGE UP (ref 3.5–5.3)
POTASSIUM SERPL-SCNC: 4.4 MMOL/L — SIGNIFICANT CHANGE UP (ref 3.5–5.3)
RBC # BLD: 2.7 M/UL — LOW (ref 3.8–5.2)
RBC # FLD: 14.2 % — SIGNIFICANT CHANGE UP (ref 10.3–14.5)
SODIUM SERPL-SCNC: 140 MMOL/L — SIGNIFICANT CHANGE UP (ref 135–145)
SPECIMEN SOURCE: SIGNIFICANT CHANGE UP
WBC # BLD: 9.3 K/UL — SIGNIFICANT CHANGE UP (ref 3.8–10.5)
WBC # FLD AUTO: 9.3 K/UL — SIGNIFICANT CHANGE UP (ref 3.8–10.5)

## 2017-08-01 PROCEDURE — 73700 CT LOWER EXTREMITY W/O DYE: CPT | Mod: 26,RT

## 2017-08-01 RX ORDER — METOPROLOL TARTRATE 50 MG
25 TABLET ORAL ONCE
Qty: 0 | Refills: 0 | Status: DISCONTINUED | OUTPATIENT
Start: 2017-08-01 | End: 2017-08-01

## 2017-08-01 RX ORDER — ERTAPENEM SODIUM 1 G/1
1000 INJECTION, POWDER, LYOPHILIZED, FOR SOLUTION INTRAMUSCULAR; INTRAVENOUS EVERY 24 HOURS
Qty: 0 | Refills: 0 | Status: DISCONTINUED | OUTPATIENT
Start: 2017-08-02 | End: 2017-08-04

## 2017-08-01 RX ORDER — METOPROLOL TARTRATE 50 MG
12.5 TABLET ORAL ONCE
Qty: 0 | Refills: 0 | Status: DISCONTINUED | OUTPATIENT
Start: 2017-08-01 | End: 2017-08-01

## 2017-08-01 RX ORDER — ERTAPENEM SODIUM 1 G/1
INJECTION, POWDER, LYOPHILIZED, FOR SOLUTION INTRAMUSCULAR; INTRAVENOUS
Qty: 0 | Refills: 0 | Status: DISCONTINUED | OUTPATIENT
Start: 2017-08-01 | End: 2017-08-04

## 2017-08-01 RX ORDER — ERTAPENEM SODIUM 1 G/1
1000 INJECTION, POWDER, LYOPHILIZED, FOR SOLUTION INTRAMUSCULAR; INTRAVENOUS ONCE
Qty: 0 | Refills: 0 | Status: COMPLETED | OUTPATIENT
Start: 2017-08-01 | End: 2017-08-01

## 2017-08-01 RX ADMIN — Medication 50 MILLIGRAM(S): at 05:36

## 2017-08-01 RX ADMIN — Medication 50 MILLIGRAM(S): at 17:04

## 2017-08-01 RX ADMIN — CEFTRIAXONE 100 GRAM(S): 500 INJECTION, POWDER, FOR SOLUTION INTRAMUSCULAR; INTRAVENOUS at 05:55

## 2017-08-01 RX ADMIN — HEPARIN SODIUM 5000 UNIT(S): 5000 INJECTION INTRAVENOUS; SUBCUTANEOUS at 14:45

## 2017-08-01 RX ADMIN — SIMVASTATIN 20 MILLIGRAM(S): 20 TABLET, FILM COATED ORAL at 21:49

## 2017-08-01 RX ADMIN — ERTAPENEM SODIUM 120 MILLIGRAM(S): 1 INJECTION, POWDER, LYOPHILIZED, FOR SOLUTION INTRAMUSCULAR; INTRAVENOUS at 17:04

## 2017-08-01 RX ADMIN — Medication 1000 UNIT(S): at 12:38

## 2017-08-01 RX ADMIN — PANTOPRAZOLE SODIUM 40 MILLIGRAM(S): 20 TABLET, DELAYED RELEASE ORAL at 07:51

## 2017-08-01 RX ADMIN — HEPARIN SODIUM 5000 UNIT(S): 5000 INJECTION INTRAVENOUS; SUBCUTANEOUS at 21:49

## 2017-08-01 RX ADMIN — HEPARIN SODIUM 5000 UNIT(S): 5000 INJECTION INTRAVENOUS; SUBCUTANEOUS at 05:35

## 2017-08-01 RX ADMIN — Medication 325 MILLIGRAM(S): at 12:38

## 2017-08-01 RX ADMIN — MIRTAZAPINE 30 MILLIGRAM(S): 45 TABLET, ORALLY DISINTEGRATING ORAL at 21:49

## 2017-08-01 NOTE — PROGRESS NOTE ADULT - PROBLEM SELECTOR PLAN 1
Stable  CT head/cervical spine: no fracture or dislocations  Xray knee/tibia/fibula: Right knee arthroplasty displaced periprosthetic fractures of the distal femur.  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 Gill, orthopedics service, today in person. CT R knee ordered to confirm R distal femur fracture seen on X ray - await official note  R Knee is braced  PT rec JONATHAN

## 2017-08-01 NOTE — PROGRESS NOTE ADULT - SUBJECTIVE AND OBJECTIVE BOX
PGY 1 Note discussed with supervising resident and primary attending    Patient is a 82y old  Female who presents with a chief complaint of fall, head laceration, UTI, NELL (30 Jul 2017 08:47)      INTERVAL HPI/OVERNIGHT EVENTS: Pt was seen and examined at bedside. Pt offers no new complaints; current symptoms resolving    MEDICATIONS  (STANDING):  sodium chloride 0.9%. 1000 milliLiter(s) (60 mL/Hr) IV Continuous <Continuous>  heparin  Injectable 5000 Unit(s) SubCutaneous every 8 hours  mirtazapine 30 milliGRAM(s) Oral at bedtime  simvastatin 20 milliGRAM(s) Oral at bedtime  metoprolol 50 milliGRAM(s) Oral two times a day  pantoprazole    Tablet 40 milliGRAM(s) Oral before breakfast  cholecalciferol 1000 Unit(s) Oral daily  ferrous    sulfate 325 milliGRAM(s) Oral daily  ertapenem  IVPB   IV Intermittent   ertapenem  IVPB 1000 milliGRAM(s) IV Intermittent once    MEDICATIONS  (PRN):  acetaminophen   Tablet 650 milliGRAM(s) Oral every 6 hours PRN For Temp greater than 38 C (100.4 F)  acetaminophen   Tablet. 650 milliGRAM(s) Oral every 6 hours PRN Mild Pain (1 - 3)  traMADol 25 milliGRAM(s) Oral every 8 hours PRN Moderate Pain (4 - 6)  morphine  - Injectable 2 milliGRAM(s) IV Push every 8 hours PRN Severe Pain (7 - 10)      __________________________________________________  REVIEW OF SYSTEMS:    CONSTITUTIONAL: No fever, chills, night sweats  EYES: no acute visual disturbances  NECK: No pain or stiffness  RESPIRATORY: No cough; No shortness of breath  CARDIOVASCULAR: No chest pain, no palpitations  GASTROINTESTINAL: No pain. No nausea or vomiting; No diarrhea  GENITOURINARY: no dysuria, no frequency, no hesitancy          Vital Signs Last 24 Hrs  T(C): 37.1 (01 Aug 2017 08:26), Max: 37.1 (01 Aug 2017 00:54)  T(F): 98.7 (01 Aug 2017 08:26), Max: 98.7 (01 Aug 2017 00:54)  HR: 87 (01 Aug 2017 08:26) (87 - 99)  BP: 119/60 (01 Aug 2017 11:51) (119/60 - 173/80)  BP(mean): --  RR: 16 (01 Aug 2017 08:26) (14 - 17)  SpO2: 97% (01 Aug 2017 08:26) (96% - 99%)    ________________________________________________  PHYSICAL EXAM:  GENERAL: NAD. Well appearing, well nourished. Normal mood & affect.   HEENT: Normocephalic;  conjunctivae and sclerae clear; moist mucous membranes;   NECK : supple  CHEST/LUNG: Clear to auscultation bilaterally with good air entry   HEART: S1 S2  regular; no murmurs, gallops or rubs  ABDOMEN: Soft, Nontender, Nondistended; Bowel sounds present  EXTREMITIES: +tenderness on deep palpation R knee; no cyanosis; no edema; no calf tenderness; peripheral pulses intact  SKIN: warm and dry; no rash  NERVOUS SYSTEM:  Awake and alert; Oriented  to place, person and time ; no new deficits  : Chris in place, cloudy urine    _________________________________________________  LABS:                        8.3    9.3   )-----------( 215      ( 01 Aug 2017 06:04 )             25.4     08-01    140  |  109<H>  |  41<H>  ----------------------------<  86  4.4   |  24  |  2.96<H>    Ca    9.0      01 Aug 2017 06:04  Phos  2.9     07-31  Mg     2.4     07-31                RADIOLOGY & ADDITIONAL TESTS:    Culture - Urine (07.30.17 @ 12:31)    -  Cefoxitin: S <=4    -  Ertapenem: S <=0.5    -  Ertapenem: S <=0.5    -  Gentamicin: S <=1    -  Gentamicin: S <=1    -  Levofloxacin: S <=1    -  Levofloxacin: S <=1    -  Trimethoprim/Sulfamethoxazole: S <=0.5/9.5    -  Trimethoprim/Sulfamethoxazole: S <=0.5/9.5    -  Ampicillin/Sulbactam: R 8/4    -  Ampicillin/Sulbactam: R 8/4    -  Cefazolin: R >16    -  Cefazolin: R >16    -  Cefoxitin: R >16    -  Ceftazidime: R 8    -  Ceftazidime: S <=1    -  Ceftriaxone: R >32    -  Imipenem: S <=1    -  Imipenem: S <=1    -  Meropenem: S <=1    -  Meropenem: S <=1    -  Nitrofurantoin: S <=32    -  Nitrofurantoin: S <=32    -  Piperacillin/Tazobactam: R <=8    -  Amikacin: S <=8    -  Amikacin: S <=8    -  Ampicillin: R >16    -  Ampicillin: R >16    -  Aztreonam: R 16    -  Aztreonam: S <=4    -  Cefepime: R >16    -  Cefepime: S <=2    -  Tobramycin: S <=2    -  Tobramycin: S <=2    -  Ceftriaxone: S <=1    -  Ciprofloxacin: S <=0.5    -  Ciprofloxacin: S <=0.5    -  Piperacillin/Tazobactam: S <=8    Specimen Source: .Urine Catheterized    Culture Results:   50,000 - 99,000 CFU/mL Escherichia coli ESBL  50,000 - 99,000 CFU/mL Citrobacter freundii    Organism Identification: Escherichia coli ESBL  Citrobacter freundii    Organism: Citrobacter freundii    Organism: Escherichia coli ESBL    Method Type: CHET    Method Type: CHET        Consultant(s) Notes Reviewed:   YES    Care Discussed with PGY-2/Attending/Consultants :  YES    Plan of care was discussed with patient and /or primary care giver; all questions and concerns were addressed and care was aligned with patient's wishes. PGY 1 Note discussed with supervising resident and primary attending    Patient is a 82y old  Female who presents with a chief complaint of fall, head laceration, UTI, NELL (30 Jul 2017 08:47)      INTERVAL HPI/OVERNIGHT EVENTS: Pt was seen and examined at bedside. Pt offers no new complaints; current symptoms stable. I spoke with pt's  and pt's daughter at bedside today. Addressed questions regarding discharge planning -  Kris is involved.    MEDICATIONS  (STANDING):  sodium chloride 0.9%. 1000 milliLiter(s) (60 mL/Hr) IV Continuous <Continuous>  heparin  Injectable 5000 Unit(s) SubCutaneous every 8 hours  mirtazapine 30 milliGRAM(s) Oral at bedtime  simvastatin 20 milliGRAM(s) Oral at bedtime  metoprolol 50 milliGRAM(s) Oral two times a day  pantoprazole    Tablet 40 milliGRAM(s) Oral before breakfast  cholecalciferol 1000 Unit(s) Oral daily  ferrous    sulfate 325 milliGRAM(s) Oral daily  ertapenem  IVPB   IV Intermittent   ertapenem  IVPB 1000 milliGRAM(s) IV Intermittent once    MEDICATIONS  (PRN):  acetaminophen   Tablet 650 milliGRAM(s) Oral every 6 hours PRN For Temp greater than 38 C (100.4 F)  acetaminophen   Tablet. 650 milliGRAM(s) Oral every 6 hours PRN Mild Pain (1 - 3)  traMADol 25 milliGRAM(s) Oral every 8 hours PRN Moderate Pain (4 - 6)  morphine  - Injectable 2 milliGRAM(s) IV Push every 8 hours PRN Severe Pain (7 - 10)      __________________________________________________  REVIEW OF SYSTEMS:    CONSTITUTIONAL: No fever, chills, night sweats  EYES: no acute visual disturbances  NECK: No pain or stiffness  RESPIRATORY: No cough; No shortness of breath  CARDIOVASCULAR: No chest pain, no palpitations  GASTROINTESTINAL: No pain. No nausea or vomiting; No diarrhea  GENITOURINARY: no dysuria, no frequency, no hesitancy          Vital Signs Last 24 Hrs  T(C): 37.1 (01 Aug 2017 08:26), Max: 37.1 (01 Aug 2017 00:54)  T(F): 98.7 (01 Aug 2017 08:26), Max: 98.7 (01 Aug 2017 00:54)  HR: 87 (01 Aug 2017 08:26) (87 - 99)  BP: 119/60 (01 Aug 2017 11:51) (119/60 - 173/80)  BP(mean): --  RR: 16 (01 Aug 2017 08:26) (14 - 17)  SpO2: 97% (01 Aug 2017 08:26) (96% - 99%)    ________________________________________________  PHYSICAL EXAM:  GENERAL: NAD. Well appearing, well nourished. Normal mood & affect.   HEENT: Normocephalic;  conjunctivae and sclerae clear; moist mucous membranes;   NECK : supple  CHEST/LUNG: Clear to auscultation bilaterally with good air entry   HEART: S1 S2  regular; no murmurs, gallops or rubs  ABDOMEN: Soft, Nontender, Nondistended; Bowel sounds present  EXTREMITIES: +tenderness on deep palpation R knee; no cyanosis; no edema; no calf tenderness; peripheral pulses intact  SKIN: warm and dry; no rash  NERVOUS SYSTEM:  Awake and alert; Oriented  to place, person and time ; no new deficits  : Chris in place, cloudy urine    _________________________________________________  LABS:                        8.3    9.3   )-----------( 215      ( 01 Aug 2017 06:04 )             25.4     08-01    140  |  109<H>  |  41<H>  ----------------------------<  86  4.4   |  24  |  2.96<H>    Ca    9.0      01 Aug 2017 06:04  Phos  2.9     07-31  Mg     2.4     07-31                RADIOLOGY & ADDITIONAL TESTS:    Culture - Urine (07.30.17 @ 12:31)    -  Cefoxitin: S <=4    -  Ertapenem: S <=0.5    -  Ertapenem: S <=0.5    -  Gentamicin: S <=1    -  Gentamicin: S <=1    -  Levofloxacin: S <=1    -  Levofloxacin: S <=1    -  Trimethoprim/Sulfamethoxazole: S <=0.5/9.5    -  Trimethoprim/Sulfamethoxazole: S <=0.5/9.5    -  Ampicillin/Sulbactam: R 8/4    -  Ampicillin/Sulbactam: R 8/4    -  Cefazolin: R >16    -  Cefazolin: R >16    -  Cefoxitin: R >16    -  Ceftazidime: R 8    -  Ceftazidime: S <=1    -  Ceftriaxone: R >32    -  Imipenem: S <=1    -  Imipenem: S <=1    -  Meropenem: S <=1    -  Meropenem: S <=1    -  Nitrofurantoin: S <=32    -  Nitrofurantoin: S <=32    -  Piperacillin/Tazobactam: R <=8    -  Amikacin: S <=8    -  Amikacin: S <=8    -  Ampicillin: R >16    -  Ampicillin: R >16    -  Aztreonam: R 16    -  Aztreonam: S <=4    -  Cefepime: R >16    -  Cefepime: S <=2    -  Tobramycin: S <=2    -  Tobramycin: S <=2    -  Ceftriaxone: S <=1    -  Ciprofloxacin: S <=0.5    -  Ciprofloxacin: S <=0.5    -  Piperacillin/Tazobactam: S <=8    Specimen Source: .Urine Catheterized    Culture Results:   50,000 - 99,000 CFU/mL Escherichia coli ESBL  50,000 - 99,000 CFU/mL Citrobacter freundii    Organism Identification: Escherichia coli ESBL  Citrobacter freundii    Organism: Citrobacter freundii    Organism: Escherichia coli ESBL    Method Type: CHET    Method Type: CHET        Consultant(s) Notes Reviewed:   YES    Care Discussed with PGY-2/Attending/Consultants :  YES    Plan of care was discussed with patient and /or primary care giver; all questions and concerns were addressed and care was aligned with patient's wishes.

## 2017-08-01 NOTE — PROGRESS NOTE ADULT - PROBLEM SELECTOR PLAN 2
ID service Dr. Posadas following pt. Appreciate advice.  UA +; Per pt, she straight caths herself at home but has been unable to do so recently  UCx ESBL E coli +... On contact isolation  start ertapenem 1g daily  d/c IV rocephin 1g daily

## 2017-08-01 NOTE — PROGRESS NOTE ADULT - SUBJECTIVE AND OBJECTIVE BOX
82y Female    Meds:  ertapenem  IVPB   IV Intermittent     Allergies    No Known Allergies    Intolerances        VITALS:  Vital Signs Last 24 Hrs  T(C): 37.3 (01 Aug 2017 16:10), Max: 37.3 (01 Aug 2017 16:10)  T(F): 99.1 (01 Aug 2017 16:10), Max: 99.1 (01 Aug 2017 16:10)  HR: 107 (01 Aug 2017 16:10) (87 - 107)  BP: 149/74 (01 Aug 2017 16:10) (119/60 - 173/80)  BP(mean): --  RR: 16 (01 Aug 2017 16:10) (14 - 16)  SpO2: 97% (01 Aug 2017 16:10) (96% - 99%)    LABS/DIAGNOSTIC TESTS:                          8.3    9.3   )-----------( 215      ( 01 Aug 2017 06:04 )             25.4         08-01    140  |  109<H>  |  41<H>  ----------------------------<  86  4.4   |  24  |  2.96<H>    Ca    9.0      01 Aug 2017 06:04  Phos  2.9     07-31  Mg     2.4     07-31            CULTURES: Culture - Urine (07.30.17 @ 12:31)    -  Cefoxitin: S <=4    -  Ertapenem: S <=0.5    -  Ertapenem: S <=0.5    -  Gentamicin: S <=1    -  Gentamicin: S <=1    -  Levofloxacin: S <=1    -  Levofloxacin: S <=1    -  Trimethoprim/Sulfamethoxazole: S <=0.5/9.5    -  Trimethoprim/Sulfamethoxazole: S <=0.5/9.5    -  Ampicillin/Sulbactam: R 8/4    -  Ampicillin/Sulbactam: R 8/4    -  Cefazolin: R >16    -  Cefazolin: R >16    -  Cefoxitin: R >16    -  Ceftazidime: R 8    -  Ceftazidime: S <=1    -  Ceftriaxone: R >32    -  Imipenem: S <=1    -  Imipenem: S <=1    -  Meropenem: S <=1    -  Meropenem: S <=1    -  Nitrofurantoin: S <=32    -  Nitrofurantoin: S <=32    -  Piperacillin/Tazobactam: R <=8    -  Amikacin: S <=8    -  Amikacin: S <=8    -  Ampicillin: R >16    -  Ampicillin: R >16    -  Aztreonam: R 16    -  Aztreonam: S <=4    -  Cefepime: R >16    -  Cefepime: S <=2    -  Tobramycin: S <=2    -  Tobramycin: S <=2    -  Ceftriaxone: S <=1    -  Ciprofloxacin: S <=0.5    -  Ciprofloxacin: S <=0.5    -  Piperacillin/Tazobactam: S <=8    Specimen Source: .Urine Catheterized    Culture Results:   50,000 - 99,000 CFU/mL Escherichia coli ESBL  50,000 - 99,000 CFU/mL Citrobacter freundii    Organism Identification: Escherichia coli ESBL  Citrobacter freundii    Organism: Citrobacter freundii    Organism: Escherichia coli ESBL    Method Type: CHET    Method Type: CHET      Culture - Blood (07.30.17 @ 12:40)    Specimen Source: .Blood Blood    Culture Results:   No growth to date.    Culture - Blood (07.30.17 @ 12:40)    Specimen Source: .Blood Blood    Culture Results:   No growth to date.        ROS:  [  ] UNABLE TO ELICIT 82y Female doing well overall, family at bedside. she grew out an ESBL e.coli hence her abxs were changed to invanz 1 gm iv qd today. the pt isn't wearing her brace and denies having any complaints.    Meds:  ertapenem  IVPB   IV Intermittent     Allergies    No Known Allergies    Intolerances        VITALS:  Vital Signs Last 24 Hrs  T(C): 37.3 (01 Aug 2017 16:10), Max: 37.3 (01 Aug 2017 16:10)  T(F): 99.1 (01 Aug 2017 16:10), Max: 99.1 (01 Aug 2017 16:10)  HR: 107 (01 Aug 2017 16:10) (87 - 107)  BP: 149/74 (01 Aug 2017 16:10) (119/60 - 173/80)  BP(mean): --  RR: 16 (01 Aug 2017 16:10) (14 - 16)  SpO2: 97% (01 Aug 2017 16:10) (96% - 99%)    LABS/DIAGNOSTIC TESTS:                          8.3    9.3   )-----------( 215      ( 01 Aug 2017 06:04 )             25.4         08-01    140  |  109<H>  |  41<H>  ----------------------------<  86  4.4   |  24  |  2.96<H>    Ca    9.0      01 Aug 2017 06:04  Phos  2.9     07-31  Mg     2.4     07-31            CULTURES: Culture - Urine (07.30.17 @ 12:31)    -  Cefoxitin: S <=4    -  Ertapenem: S <=0.5    -  Ertapenem: S <=0.5    -  Gentamicin: S <=1    -  Gentamicin: S <=1    -  Levofloxacin: S <=1    -  Levofloxacin: S <=1    -  Trimethoprim/Sulfamethoxazole: S <=0.5/9.5    -  Trimethoprim/Sulfamethoxazole: S <=0.5/9.5    -  Ampicillin/Sulbactam: R 8/4    -  Ampicillin/Sulbactam: R 8/4    -  Cefazolin: R >16    -  Cefazolin: R >16    -  Cefoxitin: R >16    -  Ceftazidime: R 8    -  Ceftazidime: S <=1    -  Ceftriaxone: R >32    -  Imipenem: S <=1    -  Imipenem: S <=1    -  Meropenem: S <=1    -  Meropenem: S <=1    -  Nitrofurantoin: S <=32    -  Nitrofurantoin: S <=32    -  Piperacillin/Tazobactam: R <=8    -  Amikacin: S <=8    -  Amikacin: S <=8    -  Ampicillin: R >16    -  Ampicillin: R >16    -  Aztreonam: R 16    -  Aztreonam: S <=4    -  Cefepime: R >16    -  Cefepime: S <=2    -  Tobramycin: S <=2    -  Tobramycin: S <=2    -  Ceftriaxone: S <=1    -  Ciprofloxacin: S <=0.5    -  Ciprofloxacin: S <=0.5    -  Piperacillin/Tazobactam: S <=8    Specimen Source: .Urine Catheterized    Culture Results:   50,000 - 99,000 CFU/mL Escherichia coli ESBL  50,000 - 99,000 CFU/mL Citrobacter freundii    Organism Identification: Escherichia coli ESBL  Citrobacter freundii    Organism: Citrobacter freundii    Organism: Escherichia coli ESBL    Method Type: CHET    Method Type: CHET      Culture - Blood (07.30.17 @ 12:40)    Specimen Source: .Blood Blood    Culture Results:   No growth to date.    Culture - Blood (07.30.17 @ 12:40)    Specimen Source: .Blood Blood    Culture Results:   No growth to date.        ROS:  [  ] UNABLE TO ELICIT

## 2017-08-01 NOTE — PROGRESS NOTE ADULT - PROBLEM SELECTOR PLAN 5
CKD stage V, and congenital unilateral hypoplastic kidney, its a known Dx and has been following Nephrologist at [facility]  Will consider US Kidney if renal function worsens

## 2017-08-01 NOTE — PROGRESS NOTE ADULT - ATTENDING COMMENTS
Patient is seen and examine. Case fully discussed with the medical team. Above note is appreciated. Will follow up with orthopedic team about fracture. Continue DVT prophylaxis.

## 2017-08-01 NOTE — PROGRESS NOTE ADULT - ASSESSMENT
UTI(ESBL)  RIGHT KNEE PROSTHESIS INFECTION  plan - cont Invanz 1 gm iv q24 hrs D#1  when ready for discharge will switch to cipro po as both organisms sensitive to it.

## 2017-08-02 ENCOUNTER — TRANSCRIPTION ENCOUNTER (OUTPATIENT)
Age: 82
End: 2017-08-02

## 2017-08-02 DIAGNOSIS — F03.90 UNSPECIFIED DEMENTIA WITHOUT BEHAVIORAL DISTURBANCE: ICD-10-CM

## 2017-08-02 DIAGNOSIS — F02.81 DEMENTIA IN OTHER DISEASES CLASSIFIED ELSEWHERE, UNSPECIFIED SEVERITY, WITH BEHAVIORAL DISTURBANCE: ICD-10-CM

## 2017-08-02 LAB
ANION GAP SERPL CALC-SCNC: 8 MMOL/L — SIGNIFICANT CHANGE UP (ref 5–17)
BUN SERPL-MCNC: 38 MG/DL — HIGH (ref 7–18)
CALCIUM SERPL-MCNC: 8.9 MG/DL — SIGNIFICANT CHANGE UP (ref 8.4–10.5)
CHLORIDE SERPL-SCNC: 108 MMOL/L — SIGNIFICANT CHANGE UP (ref 96–108)
CO2 SERPL-SCNC: 24 MMOL/L — SIGNIFICANT CHANGE UP (ref 22–31)
CREAT SERPL-MCNC: 2.79 MG/DL — HIGH (ref 0.5–1.3)
GLUCOSE SERPL-MCNC: 94 MG/DL — SIGNIFICANT CHANGE UP (ref 70–99)
HCT VFR BLD CALC: 27.7 % — LOW (ref 34.5–45)
HGB BLD-MCNC: 8.6 G/DL — LOW (ref 11.5–15.5)
MCHC RBC-ENTMCNC: 29.3 PG — SIGNIFICANT CHANGE UP (ref 27–34)
MCHC RBC-ENTMCNC: 31 GM/DL — LOW (ref 32–36)
MCV RBC AUTO: 94.4 FL — SIGNIFICANT CHANGE UP (ref 80–100)
PLATELET # BLD AUTO: 242 K/UL — SIGNIFICANT CHANGE UP (ref 150–400)
POTASSIUM SERPL-MCNC: 4.1 MMOL/L — SIGNIFICANT CHANGE UP (ref 3.5–5.3)
POTASSIUM SERPL-SCNC: 4.1 MMOL/L — SIGNIFICANT CHANGE UP (ref 3.5–5.3)
RBC # BLD: 2.94 M/UL — LOW (ref 3.8–5.2)
RBC # FLD: 14.7 % — HIGH (ref 10.3–14.5)
SODIUM SERPL-SCNC: 140 MMOL/L — SIGNIFICANT CHANGE UP (ref 135–145)
WBC # BLD: 9.2 K/UL — SIGNIFICANT CHANGE UP (ref 3.8–10.5)
WBC # FLD AUTO: 9.2 K/UL — SIGNIFICANT CHANGE UP (ref 3.8–10.5)

## 2017-08-02 RX ORDER — RISPERIDONE 4 MG/1
0.25 TABLET ORAL EVERY 6 HOURS
Qty: 0 | Refills: 0 | Status: DISCONTINUED | OUTPATIENT
Start: 2017-08-02 | End: 2017-08-04

## 2017-08-02 RX ORDER — MIRTAZAPINE 45 MG/1
15 TABLET, ORALLY DISINTEGRATING ORAL AT BEDTIME
Qty: 0 | Refills: 0 | Status: DISCONTINUED | OUTPATIENT
Start: 2017-08-02 | End: 2017-08-04

## 2017-08-02 RX ORDER — HALOPERIDOL DECANOATE 100 MG/ML
2 INJECTION INTRAMUSCULAR AT BEDTIME
Qty: 0 | Refills: 0 | Status: DISCONTINUED | OUTPATIENT
Start: 2017-08-02 | End: 2017-08-04

## 2017-08-02 RX ADMIN — Medication 50 MILLIGRAM(S): at 05:57

## 2017-08-02 RX ADMIN — Medication 1000 UNIT(S): at 11:32

## 2017-08-02 RX ADMIN — ERTAPENEM SODIUM 120 MILLIGRAM(S): 1 INJECTION, POWDER, LYOPHILIZED, FOR SOLUTION INTRAMUSCULAR; INTRAVENOUS at 14:12

## 2017-08-02 RX ADMIN — HEPARIN SODIUM 5000 UNIT(S): 5000 INJECTION INTRAVENOUS; SUBCUTANEOUS at 05:57

## 2017-08-02 RX ADMIN — HEPARIN SODIUM 5000 UNIT(S): 5000 INJECTION INTRAVENOUS; SUBCUTANEOUS at 13:46

## 2017-08-02 RX ADMIN — Medication 50 MILLIGRAM(S): at 17:48

## 2017-08-02 RX ADMIN — MIRTAZAPINE 15 MILLIGRAM(S): 45 TABLET, ORALLY DISINTEGRATING ORAL at 21:54

## 2017-08-02 RX ADMIN — PANTOPRAZOLE SODIUM 40 MILLIGRAM(S): 20 TABLET, DELAYED RELEASE ORAL at 05:57

## 2017-08-02 RX ADMIN — Medication 325 MILLIGRAM(S): at 11:32

## 2017-08-02 RX ADMIN — RISPERIDONE 0.25 MILLIGRAM(S): 4 TABLET ORAL at 17:48

## 2017-08-02 RX ADMIN — HEPARIN SODIUM 5000 UNIT(S): 5000 INJECTION INTRAVENOUS; SUBCUTANEOUS at 21:54

## 2017-08-02 RX ADMIN — SIMVASTATIN 20 MILLIGRAM(S): 20 TABLET, FILM COATED ORAL at 21:54

## 2017-08-02 NOTE — PROGRESS NOTE ADULT - PROBLEM SELECTOR PLAN 3
Stable  c/w home dose of lopresssor  DASH diet  Monitor BP ID service Dr. Posadas following pt. Appreciate advice.  UA +; Per pt, she straight caths herself at home but has been unable to do so recently  UCx ESBL E coli +... On contact isolation  c/w ertapenem 1g daily  on d/c will switch to PO cipro

## 2017-08-02 NOTE — PROGRESS NOTE ADULT - ASSESSMENT
1.	UTI(ESBL)  2.	Infected Right knee prosthesis  ·	continue Invanz 1gm IV daily D2  ·	when ready for discharge will switch to cipro po

## 2017-08-02 NOTE — PROGRESS NOTE ADULT - PROBLEM SELECTOR PLAN 2
ID service Dr. Posadas following pt. Appreciate advice.  UA +; Per pt, she straight caths herself at home but has been unable to do so recently  UCx ESBL E coli +... On contact isolation  start ertapenem 1g daily  d/c IV rocephin 1g daily pt had episode of sundown yesterday. Per RN and staff, pt was agitated and confused.  Psychiatry service Dr. Boyd was consulted. Appreciate advice.   dec Remeron to 15mg daily  Add Risperidal 0.25 q6pm  haldol 1mg IM PRN q12 agitation

## 2017-08-02 NOTE — PROGRESS NOTE ADULT - ASSESSMENT
82 F from home w/ PMHx of HTN, HLD and dementia  c/o head laceration and R knee pain s/p slip and fall. Pt was admitted for mechanical fall and UTI. 82 F from home w/ PMHx of HTN, HLD and dementia c/o head laceration and R knee pain s/p slip and fall. Pt was admitted for mechanical fall and UTI.

## 2017-08-02 NOTE — BEHAVIORAL HEALTH ASSESSMENT NOTE - SUMMARY
This is a82 y/o , female from home with PMH of Dementia on Remeron and PRN Haldol,was admitted with  s/p fall.  Patient was interviewed,chart was reviewed,case was discussed with MD.Patients  seen  Patient;" I feel better","I fructured my knee".  Patient is a/o x2,thin,petite,minimally verbal,behaviorally unpredictable.  Speech is goal directed,illogical.at times.Mood-OK.Denied s/h thought,denied a/v hallucinations.memory and cognition-limited.I&J-limited.

## 2017-08-02 NOTE — PROGRESS NOTE ADULT - SUBJECTIVE AND OBJECTIVE BOX
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 Patient continues to do well and has no complaints at this time. Currently wearing right knee brace.  Has not has any fevers in the past 2 days and wbc count is normal. Renal function has also been improving.    REVIEW OF SYSTEMS:  [  ] Not able to illicit  General: no fevers no malaise 	  GI: no nvd	  : no urinary symptoms   Skin: no rashes  Musculoskeletal: no knee pain	    Meds:  ertapenem  IVPB 1000 milliGRAM(s) IV Intermittent every 24 hours     Allergies: No Known Allergies    VITALS:  Vital Signs Last 24 Hrs  T(C): 36.9 (02 Aug 2017 07:50), Max: 37.3 (01 Aug 2017 16:10)  T(F): 98.4 (02 Aug 2017 07:50), Max: 99.1 (01 Aug 2017 16:10)  HR: 91 (02 Aug 2017 07:50) (91 - 107)  BP: 143/71 (02 Aug 2017 07:50) (143/71 - 172/76)  BP(mean): --  RR: 16 (02 Aug 2017 07:50) (16 - 16)  SpO2: 98% (02 Aug 2017 07:50) (97% - 98%)    PHYSICAL EXAM:  HEENT: n/a  Neck: supple no LN's   Respiratory: lungs clear  Cardiovascular: S1 S2 reg no murmurs  Gastrointestinal: soft with +BS, nontender and nondistended  Extremities: Right knee edema  Skin: no rashes, +right knee warmth  Ortho: right knee secured with brace  Neuro: AAO x 3    LABS/DIAGNOSTIC TESTS:                        8.6    9.2   )-----------( 242      ( 02 Aug 2017 05:51 )             27.7   08-02    140  |  108  |  38<H>  ----------------------------<  94  4.1   |  24  |  2.79<H> 2.96 < 2.99    Ca    8.9      02 Aug 2017 05:51      CULTURES: Culture - Urine (07.30.17 @ 12:31)    -  Cefoxitin: S <=4    -  Ertapenem: S <=0.5    -  Ertapenem: S <=0.5    -  Gentamicin: S <=1    -  Gentamicin: S <=1    -  Levofloxacin: S <=1    -  Levofloxacin: S <=1    -  Trimethoprim/Sulfamethoxazole: S <=0.5/9.5    -  Trimethoprim/Sulfamethoxazole: S <=0.5/9.5    -  Ampicillin/Sulbactam: R 8/4    -  Ampicillin/Sulbactam: R 8/4    -  Cefazolin: R >16    -  Cefazolin: R >16    -  Cefoxitin: R >16    -  Ceftazidime: R 8    -  Ceftazidime: S <=1    -  Ceftriaxone: R >32    -  Imipenem: S <=1    -  Imipenem: S <=1    -  Meropenem: S <=1    -  Meropenem: S <=1    -  Nitrofurantoin: S <=32    -  Nitrofurantoin: S <=32    -  Piperacillin/Tazobactam: R <=8    -  Amikacin: S <=8    -  Amikacin: S <=8    -  Ampicillin: R >16    -  Ampicillin: R >16    -  Aztreonam: R 16    -  Aztreonam: S <=4    -  Cefepime: R >16    -  Cefepime: S <=2    -  Tobramycin: S <=2    -  Tobramycin: S <=2    -  Ceftriaxone: S <=1    -  Ciprofloxacin: S <=0.5    -  Ciprofloxacin: S <=0.5    -  Piperacillin/Tazobactam: S <=8    Specimen Source: .Urine Catheterized    Culture Results:   50,000 - 99,000 CFU/mL Escherichia coli ESBL  50,000 - 99,000 CFU/mL Citrobacter freundii    Organism Identification: Escherichia coli ESBL  Citrobacter freundii    Organism: Citrobacter freundii    Organism: Escherichia coli ESBL    Method Type: CHET    Method Type: CHET      Culture - Blood (07.30.17 @ 12:40)    Specimen Source: .Blood Blood    Culture Results:   No growth to date.    Culture - Blood (07.30.17 @ 12:40)    Specimen Source: .Blood Blood    Culture Results:   No growth to date.    RADIOLOGY: No new studies

## 2017-08-02 NOTE — PROGRESS NOTE ADULT - PROBLEM SELECTOR PLAN 1
Stable  CT head/cervical spine: no fracture or dislocations  Xray knee/tibia/fibula: Right knee arthroplasty displaced periprosthetic fractures of the distal femur.  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 Gill, orthopedics service, today in person. CT R knee ordered to confirm R distal femur fracture seen on X ray - await official note  R Knee is braced  PT rec JONATHAN Orthopedics service Dr. Gill saw pt. Appreciate advice.  Stable  rec pain management and daily physical therapy   f/u w/ Dr. Kauffman or Dr. Gill after d/c [phone]  CT head/cervical spine: no fracture or dislocations  Xray knee/tibia/fibula: Right knee arthroplasty displaced periprosthetic fractures of the distal femur.  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 for the same   CRP 6.9  R Knee is braced  PT rec JONATHAN

## 2017-08-02 NOTE — PROGRESS NOTE ADULT - ATTENDING COMMENTS
Patient is seen and examined. Case fully discussed with the medical team. Above note is appreciated. Orthopedic follow up is appreciated. Conservative treatment as per orthopedics.  Will follow up with ID and orthopedics. DVT prophylaxis.

## 2017-08-02 NOTE — PROGRESS NOTE ADULT - SUBJECTIVE AND OBJECTIVE BOX
PGY 1 Note discussed with supervising resident and primary attending    Patient is a 82y old  Female who presents with a chief complaint of fall, head laceration, UTI, NELL (30 Jul 2017 08:47)      INTERVAL HPI/OVERNIGHT EVENTS: Pt was seen and examined at bedside. Pt offers no new complaints; current symptoms stable. Pt's daughter (leaving to [facility] today) and  at bedside. Per RN, pt had episode of sundowning last night - was agitated. Pt was switched to a different room overnight for observation.    MEDICATIONS  (STANDING):  sodium chloride 0.9%. 1000 milliLiter(s) (60 mL/Hr) IV Continuous <Continuous>  heparin  Injectable 5000 Unit(s) SubCutaneous every 8 hours  mirtazapine 30 milliGRAM(s) Oral at bedtime  simvastatin 20 milliGRAM(s) Oral at bedtime  metoprolol 50 milliGRAM(s) Oral two times a day  pantoprazole    Tablet 40 milliGRAM(s) Oral before breakfast  cholecalciferol 1000 Unit(s) Oral daily  ferrous    sulfate 325 milliGRAM(s) Oral daily  ertapenem  IVPB   IV Intermittent   ertapenem  IVPB 1000 milliGRAM(s) IV Intermittent every 24 hours    MEDICATIONS  (PRN):  acetaminophen   Tablet 650 milliGRAM(s) Oral every 6 hours PRN For Temp greater than 38 C (100.4 F)  acetaminophen   Tablet. 650 milliGRAM(s) Oral every 6 hours PRN Mild Pain (1 - 3)  traMADol 25 milliGRAM(s) Oral every 8 hours PRN Moderate Pain (4 - 6)  morphine  - Injectable 2 milliGRAM(s) IV Push every 8 hours PRN Severe Pain (7 - 10)  haloperidol    Injectable 2 milliGRAM(s) IV Push at bedtime PRN agitation      __________________________________________________  REVIEW OF SYSTEMS:    CONSTITUTIONAL: No fever, chills, night sweats  EYES: no acute visual disturbances  NECK: No pain or stiffness  RESPIRATORY: No cough; No shortness of breath  CARDIOVASCULAR: No chest pain, no palpitations  GASTROINTESTINAL: No pain. No nausea or vomiting; No diarrhea  GENITOURINARY: no dysuria, no frequency, no hesitancy        Vital Signs Last 24 Hrs  T(C): 36.9 (02 Aug 2017 07:50), Max: 37.3 (01 Aug 2017 16:10)  T(F): 98.4 (02 Aug 2017 07:50), Max: 99.1 (01 Aug 2017 16:10)  HR: 91 (02 Aug 2017 07:50) (91 - 107)  BP: 143/71 (02 Aug 2017 07:50) (143/71 - 172/76)  BP(mean): --  RR: 16 (02 Aug 2017 07:50) (16 - 16)  SpO2: 98% (02 Aug 2017 07:50) (97% - 98%)    ________________________________________________  PHYSICAL EXAM:  GENERAL: NAD. Well appearing, well nourished. Normal mood & affect.   HEENT: Normocephalic;  conjunctivae and sclerae clear; moist mucous membranes;   NECK : supple  CHEST/LUNG: Clear to auscultation bilaterally with good air entry   HEART: S1 S2  regular; no murmurs, gallops or rubs  ABDOMEN: Soft, Nontender, Nondistended; Bowel sounds present  EXTREMITIES: no cyanosis; no edema; no calf tenderness; peripheral pulses intact  SKIN: warm and dry; no rash  NERVOUS SYSTEM:  Awake and alert; Oriented  to place, person and time ; no new deficits  : Chris in place      _________________________________________________  LABS:                        8.6    9.2   )-----------( 242      ( 02 Aug 2017 05:51 )             27.7     08-02    140  |  108  |  38<H>  ----------------------------<  94  4.1   |  24  |  2.79<H>    Ca    8.9      02 Aug 2017 05:51        RADIOLOGY & ADDITIONAL TESTS:  CT Knee:  Impression:    Status post right total knee arthroplasty. 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   Broad cortical defects are noted along the medial femoral condyle in the   metaphyseal region and along the anterolateral aspect of the proximal   tibia. Surrounding osseous sclerosis and periosteal reaction is noted.   There is a moderate joint effusion with synovial thickening consistent   with synovitis. Findings raise concern for underlying infectionand   correlation with arthrocentesis is recommended to exclude this etiology.   Alternative etiologies for these areas of osteolysis include chronic   particle disease with associated osseous stress reaction. There is a   superimposed subacute to chronic appearing fracture through a large area   of osteolysis along the posterior aspect of the medial femoral condyle   which is oriented in the sagittal plane.    Imaging Personally Reviewed:  YES    Consultant(s) Notes Reviewed:   YES    Care Discussed with PGY-2/Attending/Consultants :  YES    Plan of care was discussed with patient and /or primary care giver; all questions and concerns were addressed and care was aligned with patient's wishes. PGY 1 Note discussed with supervising resident and primary attending    Patient is a 82y old  Female who presents with a chief complaint of fall, head laceration, UTI, NELL (30 Jul 2017 08:47)      INTERVAL HPI/OVERNIGHT EVENTS: Pt was seen and examined at bedside. Pt offers no new complaints; current symptoms stable. Pt's daughter (leaving to [facility] today) and  at bedside. Per RN, pt had episode of sundowning last night - was agitated. Pt was switched to a different room overnight for observation.    MEDICATIONS  (STANDING):  sodium chloride 0.9%. 1000 milliLiter(s) (60 mL/Hr) IV Continuous <Continuous>  heparin  Injectable 5000 Unit(s) SubCutaneous every 8 hours  mirtazapine 30 milliGRAM(s) Oral at bedtime  simvastatin 20 milliGRAM(s) Oral at bedtime  metoprolol 50 milliGRAM(s) Oral two times a day  pantoprazole    Tablet 40 milliGRAM(s) Oral before breakfast  cholecalciferol 1000 Unit(s) Oral daily  ferrous    sulfate 325 milliGRAM(s) Oral daily  ertapenem  IVPB   IV Intermittent   ertapenem  IVPB 1000 milliGRAM(s) IV Intermittent every 24 hours    MEDICATIONS  (PRN):  acetaminophen   Tablet 650 milliGRAM(s) Oral every 6 hours PRN For Temp greater than 38 C (100.4 F)  acetaminophen   Tablet. 650 milliGRAM(s) Oral every 6 hours PRN Mild Pain (1 - 3)  traMADol 25 milliGRAM(s) Oral every 8 hours PRN Moderate Pain (4 - 6)  morphine  - Injectable 2 milliGRAM(s) IV Push every 8 hours PRN Severe Pain (7 - 10)  haloperidol    Injectable 2 milliGRAM(s) IV Push at bedtime PRN agitation      __________________________________________________  REVIEW OF SYSTEMS:    CONSTITUTIONAL: No fever, chills, night sweats  EYES: no acute visual disturbances  NECK: No pain or stiffness  RESPIRATORY: No cough; No shortness of breath  CARDIOVASCULAR: No chest pain, no palpitations  GASTROINTESTINAL: No pain. No nausea or vomiting; No diarrhea  GENITOURINARY: no dysuria, no frequency, no hesitancy        Vital Signs Last 24 Hrs  T(C): 36.9 (02 Aug 2017 07:50), Max: 37.3 (01 Aug 2017 16:10)  T(F): 98.4 (02 Aug 2017 07:50), Max: 99.1 (01 Aug 2017 16:10)  HR: 91 (02 Aug 2017 07:50) (91 - 107)  BP: 143/71 (02 Aug 2017 07:50) (143/71 - 172/76)  BP(mean): --  RR: 16 (02 Aug 2017 07:50) (16 - 16)  SpO2: 98% (02 Aug 2017 07:50) (97% - 98%)    ________________________________________________  PHYSICAL EXAM:  GENERAL: NAD. Well appearing, well nourished. Normal mood & affect.   HEENT: Normocephalic;  conjunctivae and sclerae clear; moist mucous membranes;   NECK : supple  CHEST/LUNG: Clear to auscultation bilaterally with good air entry   HEART: S1 S2  regular; no murmurs, gallops or rubs  ABDOMEN: Soft, Nontender, Nondistended; Bowel sounds present  EXTREMITIES: no cyanosis; no edema; no calf tenderness; peripheral pulses intact  SKIN: warm and dry; no rash  NERVOUS SYSTEM:  Awake and alert; Oriented  to place, person and time ; no new deficits  : Chris in place      _________________________________________________  LABS:                        8.6    9.2   )-----------( 242      ( 02 Aug 2017 05:51 )             27.7     08-02    140  |  108  |  38<H>  ----------------------------<  94  4.1   |  24  |  2.79<H>    Ca    8.9      02 Aug 2017 05:51        RADIOLOGY & ADDITIONAL TESTS:  CT Knee:  Impression:    Status post right total knee arthroplasty. 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   Broad cortical defects are noted along the medial femoral condyle in the   metaphyseal region and along the anterolateral aspect of the proximal   tibia. Surrounding osseous sclerosis and periosteal reaction is noted.   There is a moderate joint effusion with synovial thickening consistent   with synovitis. Findings raise concern for underlying infectionand   correlation with arthrocentesis is recommended to exclude this etiology.   Alternative etiologies for these areas of osteolysis include chronic   particle disease with associated osseous stress reaction. There is a   superimposed subacute to chronic appearing fracture through a large area   of osteolysis along the posterior aspect of the medial femoral condyle   which is oriented in the sagittal plane.      Consultant(s) Notes Reviewed:   YES    Care Discussed with PGY-2/Attending/Consultants :  YES    Plan of care was discussed with patient and /or primary care giver; all questions and concerns were addressed and care was aligned with patient's wishes.

## 2017-08-02 NOTE — PROGRESS NOTE ADULT - PROBLEM SELECTOR PLAN 5
CKD stage V, and congenital unilateral hypoplastic kidney, its a known Dx and has been following Nephrologist at [facility]  Will consider US Kidney if renal function worsens Stable  c/w statin

## 2017-08-02 NOTE — PROGRESS NOTE ADULT - PROBLEM SELECTOR PLAN 6
IMPROVE score 2  heparin 5000u q8h CKD stage V, and congenital unilateral hypoplastic kidney, its a known Dx and has been following Nephrologist at [facility]  Will consider US Kidney if renal function worsens

## 2017-08-02 NOTE — PROGRESS NOTE ADULT - SUBJECTIVE AND OBJECTIVE BOX
82yFemale    Diagnosis:  S/p Periprosthetic Fx Right Distal Femur s/p TKA, and chronic infection R TKA    Patient was seen and evaluated at bedside. Patient with no acute complaints.   Pain is  well controlled.      Denies CP/SOB, dyspnea, paresthesias, N/V/D, palpitations.     Vital Signs Last 24 Hrs  T(C): 36.9 (02 Aug 2017 07:50), Max: 37.3 (01 Aug 2017 16:10)  T(F): 98.4 (02 Aug 2017 07:50), Max: 99.1 (01 Aug 2017 16:10)  HR: 91 (02 Aug 2017 07:50) (91 - 107)  BP: 143/71 (02 Aug 2017 07:50) (143/71 - 172/76)  BP(mean): --  RR: 16 (02 Aug 2017 07:50) (16 - 16)  SpO2: 98% (02 Aug 2017 07:50) (97% - 98%)  I&O's Detail    01 Aug 2017 07:01  -  02 Aug 2017 07:00  --------------------------------------------------------  IN:  Total IN: 0 mL    OUT:    Indwelling Catheter - Urethral: 1500 mL  Total OUT: 1500 mL    Total NET: -1500 mL          Physical Exam:    General: AAOx3, NAD, resting comfortably in bed.    Right knee: Skin is pink, warm. +effusion noted. No erythema. No drainage. Dry Scab noted along the anterior incision of the right knee.   Lower extremities:  No calf tenderness, calves are soft. 2+pulses. NVI. 5/5 Strength of EHL/TA/gastrocnemius B/L.  Good capillary refill. SILT.                          8.6    9.2   )-----------( 242      ( 02 Aug 2017 05:51 )             27.7     08-02    140  |  108  |  38<H>  ----------------------------<  94  4.1   |  24  |  2.79<H>    Ca    8.9      02 Aug 2017 05:51        Impression:  82yFemale Periprosthetic Fx Right Distal Femur s/p TKA, and chronic infection R TKA    Plan:  -  Pain management  -  Dvt prophylaxis with Hep Sq  -  Daily Physical Therapy:  WBAT of the right lower extremity with walker with [place] brace on. Locked 0-30 deg.   -  Discharge planning: Home Vs. Rehab pending Physical therapy eval.  -  Case d/w Dr. Gill  -  pt is orthopedically stable for discharge today.   -  Follow up with Private Orthopedist Dr Kauffman or Dr Gill after d/c at [phone]

## 2017-08-03 ENCOUNTER — TRANSCRIPTION ENCOUNTER (OUTPATIENT)
Age: 82
End: 2017-08-03

## 2017-08-03 RX ORDER — HALOPERIDOL DECANOATE 100 MG/ML
0 INJECTION INTRAMUSCULAR
Qty: 0 | Refills: 0 | COMMUNITY
Start: 2017-08-03

## 2017-08-03 RX ORDER — ERYTHROPOIETIN 10000 [IU]/ML
20000 INJECTION, SOLUTION INTRAVENOUS; SUBCUTANEOUS
Qty: 0 | Refills: 0 | COMMUNITY
Start: 2017-08-03

## 2017-08-03 RX ORDER — ERYTHROPOIETIN 10000 [IU]/ML
10000 INJECTION, SOLUTION INTRAVENOUS; SUBCUTANEOUS
Qty: 0 | Refills: 0 | COMMUNITY
Start: 2017-08-03

## 2017-08-03 RX ORDER — ERYTHROPOIETIN 10000 [IU]/ML
10000 INJECTION, SOLUTION INTRAVENOUS; SUBCUTANEOUS
Qty: 0 | Refills: 0 | Status: DISCONTINUED | OUTPATIENT
Start: 2017-08-03 | End: 2017-08-03

## 2017-08-03 RX ORDER — TRAMADOL HYDROCHLORIDE 50 MG/1
0.5 TABLET ORAL
Qty: 0 | Refills: 0 | COMMUNITY
Start: 2017-08-03

## 2017-08-03 RX ORDER — RISPERIDONE 4 MG/1
1 TABLET ORAL
Qty: 0 | Refills: 0 | COMMUNITY
Start: 2017-08-03

## 2017-08-03 RX ORDER — CIPROFLOXACIN LACTATE 400MG/40ML
1 VIAL (ML) INTRAVENOUS
Qty: 10 | Refills: 0 | OUTPATIENT
Start: 2017-08-03 | End: 2017-08-08

## 2017-08-03 RX ORDER — ERYTHROPOIETIN 10000 [IU]/ML
10000 INJECTION, SOLUTION INTRAVENOUS; SUBCUTANEOUS ONCE
Qty: 0 | Refills: 0 | Status: COMPLETED | OUTPATIENT
Start: 2017-08-03 | End: 2017-08-03

## 2017-08-03 RX ADMIN — Medication 50 MILLIGRAM(S): at 19:25

## 2017-08-03 RX ADMIN — HEPARIN SODIUM 5000 UNIT(S): 5000 INJECTION INTRAVENOUS; SUBCUTANEOUS at 14:04

## 2017-08-03 RX ADMIN — HEPARIN SODIUM 5000 UNIT(S): 5000 INJECTION INTRAVENOUS; SUBCUTANEOUS at 05:29

## 2017-08-03 RX ADMIN — RISPERIDONE 0.25 MILLIGRAM(S): 4 TABLET ORAL at 14:03

## 2017-08-03 RX ADMIN — SIMVASTATIN 20 MILLIGRAM(S): 20 TABLET, FILM COATED ORAL at 23:21

## 2017-08-03 RX ADMIN — RISPERIDONE 0.25 MILLIGRAM(S): 4 TABLET ORAL at 00:29

## 2017-08-03 RX ADMIN — PANTOPRAZOLE SODIUM 40 MILLIGRAM(S): 20 TABLET, DELAYED RELEASE ORAL at 05:29

## 2017-08-03 RX ADMIN — RISPERIDONE 0.25 MILLIGRAM(S): 4 TABLET ORAL at 06:38

## 2017-08-03 RX ADMIN — HEPARIN SODIUM 5000 UNIT(S): 5000 INJECTION INTRAVENOUS; SUBCUTANEOUS at 23:21

## 2017-08-03 RX ADMIN — RISPERIDONE 0.25 MILLIGRAM(S): 4 TABLET ORAL at 23:20

## 2017-08-03 RX ADMIN — ERTAPENEM SODIUM 120 MILLIGRAM(S): 1 INJECTION, POWDER, LYOPHILIZED, FOR SOLUTION INTRAMUSCULAR; INTRAVENOUS at 16:29

## 2017-08-03 RX ADMIN — Medication 50 MILLIGRAM(S): at 05:29

## 2017-08-03 RX ADMIN — RISPERIDONE 0.25 MILLIGRAM(S): 4 TABLET ORAL at 19:25

## 2017-08-03 RX ADMIN — MIRTAZAPINE 15 MILLIGRAM(S): 45 TABLET, ORALLY DISINTEGRATING ORAL at 23:20

## 2017-08-03 RX ADMIN — Medication 325 MILLIGRAM(S): at 14:04

## 2017-08-03 RX ADMIN — ERYTHROPOIETIN 10000 UNIT(S): 10000 INJECTION, SOLUTION INTRAVENOUS; SUBCUTANEOUS at 19:25

## 2017-08-03 NOTE — DISCHARGE NOTE ADULT - CARE PLAN
Principal Discharge DX:	Fall at home, initial encounter  Goal:	Prevent future falls.  Instructions for follow-up, activity and diet:	You were seen and managed for your fall. Your knee fracture is now stable. Please follow up with PCP and orthopedist after you have completed rehab therapy.  Secondary Diagnosis:	Dementia  Goal:	Avoid rapid decline of dementia  Secondary Diagnosis:	UTI (urinary tract infection)  Goal:	Avoid future infection of the urinary tract  Secondary Diagnosis:	HTN (hypertension)  Goal:	Continue medications, Goal BP < 140/90  Secondary Diagnosis:	HLD (hyperlipidemia)  Goal:	Avoid fatty food  Secondary Diagnosis:	CKD (chronic kidney disease)  Goal:	Continue medications and avoid dehydration

## 2017-08-03 NOTE — PROGRESS NOTE ADULT - SUBJECTIVE AND OBJECTIVE BOX
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 Patient feels much better today and was able to ambulate with assistance. Was just accepted to [facility] re[facility] and will go on po antibiotics. Currently wearing right knee brace.  Remains afebrile with normal wbc count. Renal function continues to improve.    REVIEW OF SYSTEMS:  [  ] Not able to illicit  General: no fevers no malaise 	  : no urinary symptoms   Skin: no rashes  Musculoskeletal: no knee pain	    Meds:  ertapenem  IVPB 1000 milliGRAM(s) IV Intermittent every 24 hours     Allergies: No Known Allergies    VITALS:  Vital Signs Last 24 Hrs  T(C): 37 (03 Aug 2017 08:11), Max: 37 (03 Aug 2017 08:11)  T(F): 98.6 (03 Aug 2017 08:11), Max: 98.6 (03 Aug 2017 08:11)  HR: 92 (03 Aug 2017 11:12) (92 - 112)  BP: 162/81 (03 Aug 2017 11:12) (139/63 - 162/81)  BP(mean): --  RR: 16 (03 Aug 2017 08:11) (16 - 16)  SpO2: 100% (03 Aug 2017 11:12) (99% - 100%)    PHYSICAL EXAM:  HEENT: n/a  Neck: supple no LN's   Respiratory: lungs clear  Cardiovascular: S1 S2 reg no murmurs  Gastrointestinal: soft with +BS, nontender and nondistended  Extremities: Right knee edema  Skin: no rashes, +right knee warmth  Ortho: right knee secured with brace  Neuro: AAO x 3    LABS/DIAGNOSTIC TESTS:                        8.6    9.2   )-----------( 242      ( 02 Aug 2017 05:51 )             27.7   08-02    140  |  108  |  38<H>  ----------------------------<  94  4.1   |  24  |  2.79<H> 2.96 < 2.99    Ca    8.9      02 Aug 2017 05:51      CULTURES: Culture - Urine (07.30.17 @ 12:31)    -  Cefoxitin: S <=4    -  Ertapenem: S <=0.5    -  Ertapenem: S <=0.5    -  Gentamicin: S <=1    -  Gentamicin: S <=1    -  Levofloxacin: S <=1    -  Levofloxacin: S <=1    -  Trimethoprim/Sulfamethoxazole: S <=0.5/9.5    -  Trimethoprim/Sulfamethoxazole: S <=0.5/9.5    -  Ampicillin/Sulbactam: R 8/4    -  Ampicillin/Sulbactam: R 8/4    -  Cefazolin: R >16    -  Cefazolin: R >16    -  Cefoxitin: R >16    -  Ceftazidime: R 8    -  Ceftazidime: S <=1    -  Ceftriaxone: R >32    -  Imipenem: S <=1    -  Imipenem: S <=1    -  Meropenem: S <=1    -  Meropenem: S <=1    -  Nitrofurantoin: S <=32    -  Nitrofurantoin: S <=32    -  Piperacillin/Tazobactam: R <=8    -  Amikacin: S <=8    -  Amikacin: S <=8    -  Ampicillin: R >16    -  Ampicillin: R >16    -  Aztreonam: R 16    -  Aztreonam: S <=4    -  Cefepime: R >16    -  Cefepime: S <=2    -  Tobramycin: S <=2    -  Tobramycin: S <=2    -  Ceftriaxone: S <=1    -  Ciprofloxacin: S <=0.5    -  Ciprofloxacin: S <=0.5    -  Piperacillin/Tazobactam: S <=8    Specimen Source: .Urine Catheterized    Culture Results:   50,000 - 99,000 CFU/mL Escherichia coli ESBL  50,000 - 99,000 CFU/mL Citrobacter freundii    Organism Identification: Escherichia coli ESBL  Citrobacter freundii    Organism: Citrobacter freundii    Organism: Escherichia coli ESBL    Method Type: CHET    Method Type: CHET    Culture - Blood (07.30.17 @ 12:40)    Specimen Source: .Blood Blood    Culture Results:   No growth to date.    Culture - Blood (07.30.17 @ 12:40)    Specimen Source: .Blood Blood    Culture Results:   No growth to date.    RADIOLOGY: No new studies

## 2017-08-03 NOTE — DISCHARGE NOTE ADULT - SECONDARY DIAGNOSIS.
Dementia UTI (urinary tract infection) HTN (hypertension) HLD (hyperlipidemia) CKD (chronic kidney disease)

## 2017-08-03 NOTE — PROGRESS NOTE ADULT - SUBJECTIVE AND OBJECTIVE BOX
CHIEF COMPLAINT:Patient is a 82y old  Female who presents with a chief complaint of fall, head laceration, UTI, NELL (03 Aug 2017 15:36)    	  REVIEW OF SYSTEMS:  CONSTITUTIONAL: No fever, weight loss, or fatigue  EYES: No eye pain, visual disturbances, or discharge  ENMT:  No difficulty hearing, tinnitus, vertigo; No sinus or throat pain  NECK: No pain or stiffness  RESPIRATORY: No cough, wheezing, chills or hemoptysis; No Shortness of Breath  CARDIOVASCULAR: No chest pain, palpitations, passing out, dizziness, or leg swelling  GASTROINTESTINAL: No abdominal or epigastric pain. No nausea, vomiting, or hematemesis; No diarrhea or constipation. No melena or hematochezia.  GENITOURINARY: No dysuria, frequency, hematuria, or incontinence  NEUROLOGICAL: No headaches, memory loss, loss of strength, numbness, or tremors  SKIN: No itching, burning, rashes, or lesions   LYMPH Nodes: No enlarged glands  ENDOCRINE: No heat or cold intolerance; No hair loss  MUSCULOSKELETAL: No joint pain or swelling; No muscle, back, or extremity pain  PSYCHIATRIC: No depression, anxiety, mood swings, or difficulty sleeping  HEME/LYMPH: No easy bruising, or bleeding gums  ALLERY AND IMMUNOLOGIC: No hives or eczema	    [ ] All others negative	  [ ] Unable to obtain    PHYSICAL EXAM:  T(C): 36.9 (08-03-17 @ 15:59), Max: 37 (08-03-17 @ 08:11)  HR: 103 (08-03-17 @ 15:59) (92 - 112)  BP: 134/67 (08-03-17 @ 15:59) (134/67 - 162/81)  RR: 16 (08-03-17 @ 15:59) (16 - 16)  SpO2: 98% (08-03-17 @ 15:59) (98% - 100%)  Wt(kg): --  I&O's Summary    02 Aug 2017 07:01  -  03 Aug 2017 07:00  --------------------------------------------------------  IN: 0 mL / OUT: 900 mL / NET: -900 mL        Appearance: Normal	  HEENT:   Normal oral mucosa, PERRL, EOMI	  Lymphatic: No lymphadenopathy  Cardiovascular: Normal S1 S2, No JVD, No murmurs, No edema  Respiratory: Lungs clear to auscultation	  Psychiatry: A & O x 3, Mood & affect appropriate  Gastrointestinal:  Soft, Non-tender, + BS	  Skin: No rashes, No ecchymoses, No cyanosis	  Neurologic: Non-focal  Extremities: Normal range of motion, No clubbing, cyanosis or edema  Vascular: Peripheral pulses palpable 2+ bilaterally    MEDICATIONS  (STANDING):  sodium chloride 0.9%. 1000 milliLiter(s) (60 mL/Hr) IV Continuous <Continuous>  heparin  Injectable 5000 Unit(s) SubCutaneous every 8 hours  simvastatin 20 milliGRAM(s) Oral at bedtime  metoprolol 50 milliGRAM(s) Oral two times a day  pantoprazole    Tablet 40 milliGRAM(s) Oral before breakfast  ferrous    sulfate 325 milliGRAM(s) Oral daily  ertapenem  IVPB   IV Intermittent   ertapenem  IVPB 1000 milliGRAM(s) IV Intermittent every 24 hours  mirtazapine 15 milliGRAM(s) Oral at bedtime  risperiDONE   Tablet 0.25 milliGRAM(s) Oral every 6 hours      TELEMETRY: 	    ECG:  	  RADIOLOGY:  OTHER: 	  	  CBC Full  -  ( 02 Aug 2017 05:51 )  WBC Count : 9.2 K/uL  Hemoglobin : 8.6 g/dL  Hematocrit : 27.7 %  Platelet Count - Automated : 242 K/uL  Mean Cell Volume : 94.4 fl  Mean Cell Hemoglobin : 29.3 pg  Mean Cell Hemoglobin Concentration : 31.0 gm/dL  Auto Neutrophil # : x  Auto Lymphocyte # : x  Auto Monocyte # : x  Auto Eosinophil # : x  Auto Basophil # : x  Auto Neutrophil % : x  Auto Lymphocyte % : x  Auto Monocyte % : x  Auto Eosinophil % : x  Auto Basophil % : x      08-02    140  |  108  |  38<H>  ----------------------------<  94  4.1   |  24  |  2.79<H>    Ca    8.9      02 Aug 2017 05:51        CARDIAC MARKERS:                                8.6    9.2   )-----------( 242      ( 02 Aug 2017 05:51 )             27.7       08-02    140  |  108  |  38<H>  ----------------------------<  94  4.1   |  24  |  2.79<H>    Ca    8.9      02 Aug 2017 05:51              proBNP:   Lipid Profile: Cholesterol 149  LDL 87  HDL 49  TG 65    HgA1c: Hemoglobin A1C, Whole Blood: 5.2 % (07-30 @ 15:24)    TSH: Thyroid Stimulating Hormone, Serum: 3.23 uU/mL (07-30 @ 12:34)

## 2017-08-03 NOTE — PROGRESS NOTE ADULT - ATTENDING COMMENTS
Patient is seen and examined. Case fully discussed with the medical team. Above note is appreciated. Orthopedic follow up is appreciated. Conservative treatment as per orthopedics.  Will follow up with ID and orthopedics. DVT prophylaxis. Discharge planning.

## 2017-08-03 NOTE — DISCHARGE NOTE ADULT - CARE PROVIDERS DIRECT ADDRESSES
,[email or web address]

## 2017-08-03 NOTE — PROGRESS NOTE ADULT - ASSESSMENT
1.	UTI(ESBL)  2.	Infected Right knee prosthesis - no tx for this  ·	dc planning today  ·	dc on cipro 250mg PO BID x 5 days   ·	reconsult prn 1.	UTI(ESBL)  2.	Infected Right knee prosthesis - no tx for this  ·	dc planning   ·	continue invanz 1gm IV daily, upon can switch dc on cipro 250mg PO BID x 5 days 1.	UTI(ESBL)  2.	Infected Right knee prosthesis - no tx for this  ·	dc planning   ·	continue invanz 1gm IV daily, upon dc can switch to cipro 250mg PO BID x 5 days

## 2017-08-03 NOTE — DISCHARGE NOTE ADULT - PLAN OF CARE
Prevent future falls. You were seen and managed for your fall. Your knee fracture is now stable. Please follow up with PCP and orthopedist after you have completed rehab therapy. Avoid rapid decline of dementia Avoid future infection of the urinary tract Continue medications, Goal BP < 140/90 Avoid fatty food Continue medications and avoid dehydration

## 2017-08-03 NOTE — DISCHARGE NOTE ADULT - PATIENT PORTAL LINK FT
“You can access the FollowHealth Patient Portal, offered by [facility], by registering with the following website: [email or web address]”

## 2017-08-03 NOTE — DISCHARGE NOTE ADULT - HOSPITAL COURSE
81 y/o female from home, with a PMHx of HTN, HLD and dementia present to ED c/o head laceration and R knee pain s/p slip and fall.  notes the pt used to self catheter, however patient now with roque cath in place as patient has dementia and unable to self cath ( indication of roque unknown however when asked, cesiae patient gives a vague Hx and says that she had a RTA 3 years ago, when she was injured and since then has a been self catheterizing, ? neurogenic bladder ).  notes the pt bag from roque was lost in the morning, but cath was still in place.Cath bag was last changed 15 days ago. patient reports the she slipped on her urine and hit the back of her head causing bleeding, patient denies any palpitaions, sweating, headaches or other prodormal symptoms before she fell. Patient also denies LOC  post fall but says she hit her head and noticed blood after which her  came in right after and called EMS. However information was later obtained from  who affirmed that patient had a mechanical fall from slipping because of urine on the floor.  also reports a chronic wound over the right lower anterior knee that constantly oozes. Patient has a H/O b/l TKR and has been following Dr Kauffman at [facility] as there has been infection of the R knee prosthesis that has been oozing on and off and plan was to replace prosthesis.  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 She also has anemia of CKD and Hb of 10 is baseline per . patient sees Dr Pinedo for procrit shots every 2 weeks.She has a laceration over back of her scalp, with matted hair 2/2 to dry blood over it and hence laceration site, unable to visualize. ROS negative except above. Pt was admitted to medicine floor for pain management and orthopedics follow up.   Orthopedics service Dr. Gill followed pt - rec daily physical therapy and applied [place] brace on right knee. Pt will follow up with Dr. Kauffman or Dr. Gill post rehab.   Infectious Disease service Dr. Posadas followed pt - pt received ertapenem during hospitalization (ESBL E Coli in urine culture) and rec cipro for 5 days as outpatient. 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   Psychiatry service Dr. Boyd followed pt as pt had episode of sundown and was confused and agitated - rec decreasing remeron and adding risperdal at night time.   Pt is now medically stable for discharge to rehab and will follow up with PCP and orthopedist after rehab therapy. 83 y/o female from home, with a PMHx of HTN, HLD and dementia present to ED c/o head laceration and R knee pain s/p slip and fall.  notes the pt used to self catheter, however patient now with roque cath in place as patient has dementia and unable to self cath ( indication of roque unknown however when asked, cesiae patient gives a vague Hx and says that she had a RTA 3 years ago, when she was injured and since then has a been self catheterizing, ? neurogenic bladder ).  notes the pt bag from roque was lost in the morning, but cath was still in place.Cath bag was last changed 15 days ago. patient reports the she slipped on her urine and hit the back of her head causing bleeding, patient denies any palpitaions, sweating, headaches or other prodormal symptoms before she fell. Patient also denies LOC  post fall but says she hit her head and noticed blood after which her  came in right after and called EMS. However information was later obtained from  who affirmed that patient had a mechanical fall from slipping because of urine on the floor.  also reports a chronic wound over the right lower anterior knee that constantly oozes. Patient has a H/O b/l TKR and has been following Dr Kauffman at [facility] as there has been infection of the R knee prosthesis that has been oozing on and off and plan was to replace prosthesis.  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 She also has anemia of CKD and Hb of 10 is baseline per . patient sees Dr Pinedo for procrit shots every 2 weeks.She has a laceration over back of her scalp, with matted hair 2/2 to dry blood over it and hence laceration site, unable to visualize. ROS negative except above. Pt was admitted to medicine floor for pain management and orthopedics follow up.   Orthopedics service Dr. Gill followed pt - rec daily physical therapy and applied [place] brace on right knee. Pt will follow up with Dr. Kauffman or Dr. Gill post rehab.   Infectious Disease service Dr. Posadas followed pt - pt received ertapenem during hospitalization (ESBL E Coli in urine culture) and rec cipro for 5 days as outpatient. 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   Psychiatry service Dr. Boyd followed pt as pt had episode of sundown and was confused and agitated - rec decreasing remeron and adding risperdal at night time.   Pt follows up with Dr. Schilling  at [facility] for nephrology follow up. Pt receives [id number] daily of Procrit.  Pt is now medically stable for discharge to rehab and will follow up with PCP and orthopedist after rehab therapy.

## 2017-08-03 NOTE — DISCHARGE NOTE ADULT - CARE PROVIDER_API CALL
Karina Wilson (MD), Geriatric Medicine; Internal Medicine  [address]  [place] [address]  Phone: [phone]  Fax: [phone]    Romel Schilling (), Nephrology  [address]  [place] [address]  Phone: [phone]  Fax: [phone]

## 2017-08-03 NOTE — DISCHARGE NOTE ADULT - MEDICATION SUMMARY - MEDICATIONS TO TAKE
I will START or STAY ON the medications listed below when I get home from the hospital:    Tylenol 325 mg oral tablet  -- 2 tab(s) by mouth every 4 hours  -- Indication: For Pain control    traMADol 50 mg oral tablet  -- 0.5 tab(s) by mouth every 8 hours, As needed, Moderate Pain (4 - 6)  -- Indication: For Pain  control    mirtazapine 30 mg oral tablet  -- 1 tab(s) by mouth once a day (at bedtime)  -- Indication: For Dementia    simvastatin 20 mg oral tablet  -- 1 tab(s) by mouth once a day (at bedtime)  -- Indication: For HLD (hyperlipidemia)    haloperidol  --     -- Indication: For Agitation    risperiDONE 0.25 mg oral tablet  -- 1 tab(s) by mouth every 6 hours  -- Indication: For Agitation    metoprolol tartrate 50 mg oral tablet  -- 1 tab(s) by mouth 2 times a day  -- Indication: For HTN (hypertension)    Feosol Iron  --  by mouth   -- Indication: For Nutrient supplement    pantoprazole 40 mg oral delayed release tablet  --  by mouth   -- Indication: For GI protective    Cipro 250 mg oral tablet  -- 1 tab(s) by mouth 2 times a day  -- Avoid prolonged or excessive exposure to direct and/or artificial sunlight while taking this medication.  Check with your doctor before becoming pregnant.  Do not take dairy products, antacids, or iron preparations within one hour of this medication.  Finish all this medication unless otherwise directed by prescriber.  Medication should be taken with plenty of water.    -- Indication: For UTI (urinary tract infection)    Vitamin D3 2000 intl units oral capsule  -- 1 cap(s) by mouth once a day  -- Indication: For Nutrient supplement I will START or STAY ON the medications listed below when I get home from the hospital:    Tylenol 325 mg oral tablet  -- 2 tab(s) by mouth every 4 hours  -- Indication: For Pain control    traMADol 50 mg oral tablet  -- 0.5 tab(s) by mouth every 8 hours, As needed, Moderate Pain (4 - 6)  -- Indication: For Pain  control    mirtazapine 30 mg oral tablet  -- 1 tab(s) by mouth once a day (at bedtime)  -- Indication: For Dementia    simvastatin 20 mg oral tablet  -- 1 tab(s) by mouth once a day (at bedtime)  -- Indication: For HLD (hyperlipidemia)    haloperidol  --     -- Indication: For Agitation    risperiDONE 0.25 mg oral tablet  -- 1 tab(s) by mouth every 6 hours  -- Indication: For Agitation    metoprolol tartrate 50 mg oral tablet  -- 1 tab(s) by mouth 2 times a day  -- Indication: For HTN (hypertension)    epoetin marco  -- 40499 unit(s) intravenous once a week  -- Indication: For Congenital kidney disease    Feosol Iron  --  by mouth   -- Indication: For Nutrient supplement    pantoprazole 40 mg oral delayed release tablet  --  by mouth   -- Indication: For GI protective    Cipro 250 mg oral tablet  -- 1 tab(s) by mouth 2 times a day  -- Avoid prolonged or excessive exposure to direct and/or artificial sunlight while taking this medication.  Check with your doctor before becoming pregnant.  Do not take dairy products, antacids, or iron preparations within one hour of this medication.  Finish all this medication unless otherwise directed by prescriber.  Medication should be taken with plenty of water.    -- Indication: For UTI (urinary tract infection)    Vitamin D3 2000 intl units oral capsule  -- 1 cap(s) by mouth once a day  -- Indication: For Nutrient supplement

## 2017-08-04 VITALS
RESPIRATION RATE: 16 BRPM | HEART RATE: 90 BPM | TEMPERATURE: 98 F | SYSTOLIC BLOOD PRESSURE: 104 MMHG | OXYGEN SATURATION: 100 % | DIASTOLIC BLOOD PRESSURE: 54 MMHG

## 2017-08-04 PROCEDURE — 84443 ASSAY THYROID STIM HORMONE: CPT

## 2017-08-04 PROCEDURE — 85045 AUTOMATED RETICULOCYTE COUNT: CPT

## 2017-08-04 PROCEDURE — 73502 X-RAY EXAM HIP UNI 2-3 VIEWS: CPT

## 2017-08-04 PROCEDURE — 97162 PT EVAL MOD COMPLEX 30 MIN: CPT

## 2017-08-04 PROCEDURE — 96374 THER/PROPH/DIAG INJ IV PUSH: CPT | Mod: XU

## 2017-08-04 PROCEDURE — 84100 ASSAY OF PHOSPHORUS: CPT

## 2017-08-04 PROCEDURE — 71045 X-RAY EXAM CHEST 1 VIEW: CPT

## 2017-08-04 PROCEDURE — 82746 ASSAY OF FOLIC ACID SERUM: CPT

## 2017-08-04 PROCEDURE — 97110 THERAPEUTIC EXERCISES: CPT

## 2017-08-04 PROCEDURE — 80061 LIPID PANEL: CPT

## 2017-08-04 PROCEDURE — 97116 GAIT TRAINING THERAPY: CPT

## 2017-08-04 PROCEDURE — 85027 COMPLETE CBC AUTOMATED: CPT

## 2017-08-04 PROCEDURE — 82306 VITAMIN D 25 HYDROXY: CPT

## 2017-08-04 PROCEDURE — 82728 ASSAY OF FERRITIN: CPT

## 2017-08-04 PROCEDURE — 96376 TX/PRO/DX INJ SAME DRUG ADON: CPT | Mod: XU

## 2017-08-04 PROCEDURE — 85610 PROTHROMBIN TIME: CPT

## 2017-08-04 PROCEDURE — 86850 RBC ANTIBODY SCREEN: CPT

## 2017-08-04 PROCEDURE — 72170 X-RAY EXAM OF PELVIS: CPT

## 2017-08-04 PROCEDURE — 82607 VITAMIN B-12: CPT

## 2017-08-04 PROCEDURE — 73562 X-RAY EXAM OF KNEE 3: CPT

## 2017-08-04 PROCEDURE — 97530 THERAPEUTIC ACTIVITIES: CPT

## 2017-08-04 PROCEDURE — 85652 RBC SED RATE AUTOMATED: CPT

## 2017-08-04 PROCEDURE — 81001 URINALYSIS AUTO W/SCOPE: CPT

## 2017-08-04 PROCEDURE — 96375 TX/PRO/DX INJ NEW DRUG ADDON: CPT | Mod: XU

## 2017-08-04 PROCEDURE — 70450 CT HEAD/BRAIN W/O DYE: CPT

## 2017-08-04 PROCEDURE — 36415 COLL VENOUS BLD VENIPUNCTURE: CPT

## 2017-08-04 PROCEDURE — 73700 CT LOWER EXTREMITY W/O DYE: CPT

## 2017-08-04 PROCEDURE — 83010 ASSAY OF HAPTOGLOBIN QUANT: CPT

## 2017-08-04 PROCEDURE — 85730 THROMBOPLASTIN TIME PARTIAL: CPT

## 2017-08-04 PROCEDURE — 86900 BLOOD TYPING SEROLOGIC ABO: CPT

## 2017-08-04 PROCEDURE — 99285 EMERGENCY DEPT VISIT HI MDM: CPT | Mod: 25

## 2017-08-04 PROCEDURE — 83605 ASSAY OF LACTIC ACID: CPT

## 2017-08-04 PROCEDURE — 72125 CT NECK SPINE W/O DYE: CPT

## 2017-08-04 PROCEDURE — 86901 BLOOD TYPING SEROLOGIC RH(D): CPT

## 2017-08-04 PROCEDURE — 29505 APPLICATION LONG LEG SPLINT: CPT | Mod: RT

## 2017-08-04 PROCEDURE — 80053 COMPREHEN METABOLIC PANEL: CPT

## 2017-08-04 PROCEDURE — 87040 BLOOD CULTURE FOR BACTERIA: CPT

## 2017-08-04 PROCEDURE — 93306 TTE W/DOPPLER COMPLETE: CPT

## 2017-08-04 PROCEDURE — 87086 URINE CULTURE/COLONY COUNT: CPT

## 2017-08-04 PROCEDURE — 83735 ASSAY OF MAGNESIUM: CPT

## 2017-08-04 PROCEDURE — 73552 X-RAY EXAM OF FEMUR 2/>: CPT

## 2017-08-04 PROCEDURE — 73590 X-RAY EXAM OF LOWER LEG: CPT

## 2017-08-04 PROCEDURE — 83615 LACTATE (LD) (LDH) ENZYME: CPT

## 2017-08-04 PROCEDURE — 87186 SC STD MICRODIL/AGAR DIL: CPT

## 2017-08-04 PROCEDURE — 80048 BASIC METABOLIC PNL TOTAL CA: CPT

## 2017-08-04 PROCEDURE — 83550 IRON BINDING TEST: CPT

## 2017-08-04 PROCEDURE — 84466 ASSAY OF TRANSFERRIN: CPT

## 2017-08-04 PROCEDURE — 86140 C-REACTIVE PROTEIN: CPT

## 2017-08-04 PROCEDURE — 83036 HEMOGLOBIN GLYCOSYLATED A1C: CPT

## 2017-08-04 PROCEDURE — 93005 ELECTROCARDIOGRAM TRACING: CPT

## 2017-08-04 RX ADMIN — HEPARIN SODIUM 5000 UNIT(S): 5000 INJECTION INTRAVENOUS; SUBCUTANEOUS at 14:53

## 2017-08-04 RX ADMIN — TRAMADOL HYDROCHLORIDE 25 MILLIGRAM(S): 50 TABLET ORAL at 10:42

## 2017-08-04 RX ADMIN — Medication 325 MILLIGRAM(S): at 12:15

## 2017-08-04 RX ADMIN — PANTOPRAZOLE SODIUM 40 MILLIGRAM(S): 20 TABLET, DELAYED RELEASE ORAL at 06:16

## 2017-08-04 RX ADMIN — TRAMADOL HYDROCHLORIDE 25 MILLIGRAM(S): 50 TABLET ORAL at 12:16

## 2017-08-04 RX ADMIN — Medication 50 MILLIGRAM(S): at 06:16

## 2017-08-04 RX ADMIN — RISPERIDONE 0.25 MILLIGRAM(S): 4 TABLET ORAL at 12:15

## 2017-08-04 RX ADMIN — RISPERIDONE 0.25 MILLIGRAM(S): 4 TABLET ORAL at 06:16

## 2017-08-04 RX ADMIN — HEPARIN SODIUM 5000 UNIT(S): 5000 INJECTION INTRAVENOUS; SUBCUTANEOUS at 06:16

## 2017-08-04 NOTE — DIETITIAN INITIAL EVALUATION ADULT. - OTHER INFO
Patient seen for LOS x6days. Patient from home lives with . Visited pt. reports appetite fair, eats small vegetarian meals at home, denies nausea vomiting/diarrhea PTA, stated 140-145 Lbs & stable for sometime?, in house consuming 40% of meals & tolerating, willing to try oral supplement at one meal, encourage po intake, knee/wound care noted.

## 2017-08-04 NOTE — PROGRESS NOTE ADULT - ASSESSMENT
1.	UTI(ESBL)  2.	Infected Right knee prosthesis - no tx for this  ·	dc planning possibly today  ·	continue invanz 1gm IV daily, upon dc can switch to cipro 250mg PO BID x 4 days

## 2017-08-04 NOTE — PROGRESS NOTE ADULT - PROVIDER SPECIALTY LIST ADULT
Infectious Disease
Internal Medicine
Internal Medicine
Orthopedics
Internal Medicine
Internal Medicine

## 2017-08-04 NOTE — PROGRESS NOTE ADULT - SUBJECTIVE AND OBJECTIVE BOX
82y Female is under our care for UTI. Patient has no complaints at this time, but found to have high BP of systolic in 170's as per PT.  [facility]. Remains afebrile.    REVIEW OF SYSTEMS:  [  ] Not able to illicit  General: no fevers no malaise 	  : no urinary symptoms   Skin: no rashes  Musculoskeletal: no knee pain	    Meds:  ertapenem  IVPB 1000 milliGRAM(s) IV Intermittent every 24 hours     Allergies: No Known Allergies    VITALS:  Vital Signs Last 24 Hrs  T(C): 36.9 (04 Aug 2017 08:16), Max: 36.9 (03 Aug 2017 15:59)  T(F): 98.5 (04 Aug 2017 08:16), Max: 98.5 (03 Aug 2017 15:59)  HR: 103 (04 Aug 2017 10:39) (79 - 119)  BP: 173/92 (04 Aug 2017 10:39) (134/67 - 173/92)  BP(mean): --  RR: 16 (04 Aug 2017 10:05) (15 - 16)  SpO2: 100% (04 Aug 2017 10:39) (98% - 100%)    PHYSICAL EXAM:  HEENT: n/a  Neck: supple no LN's   Respiratory: lungs clear  Cardiovascular: S1 S2 reg no murmurs  Gastrointestinal: soft with +BS, nontender and nondistended  Extremities: Right knee edema - stable  Skin: no rashes, +right knee warmth  Ortho: right knee secured with immobilzer  Neuro: AAO x 2-3    LABS/DIAGNOSTIC TESTS:  No new labs    CULTURES:  Culture - Blood (07.30.17 @ 12:40)    Specimen Source: .Blood Blood    Culture Results:   No growth to date.    Culture - Blood (07.30.17 @ 12:40)    Specimen Source: .Blood Blood    Culture Results:   No growth to date.    RADIOLOGY: No new studies

## 2017-08-09 ENCOUNTER — APPOINTMENT (OUTPATIENT)
Dept: GERIATRICS | Facility: CLINIC | Age: 82
End: 2017-08-09

## 2017-08-09 DIAGNOSIS — N18.9 CHRONIC KIDNEY DISEASE, UNSPECIFIED: ICD-10-CM
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
## 2017-08-09 DIAGNOSIS — Q60.3 RENAL HYPOPLASIA, UNILATERAL: ICD-10-CM

## 2017-08-09 DIAGNOSIS — Z79.2 LONG TERM (CURRENT) USE OF ANTIBIOTICS: ICD-10-CM

## 2017-08-09 DIAGNOSIS — F05 DELIRIUM DUE TO KNOWN PHYSIOLOGICAL CONDITION: ICD-10-CM

## 2017-08-09 DIAGNOSIS — N31.8 OTHER NEUROMUSCULAR DYSFUNCTION OF BLADDER: ICD-10-CM
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
## 2017-08-09 DIAGNOSIS — S01.81XA LACERATION WITHOUT FOREIGN BODY OF OTHER PART OF HEAD, INITIAL ENCOUNTER: ICD-10-CM

## 2017-08-09 DIAGNOSIS — F02.81 DEMENTIA IN OTHER DISEASES CLASSIFIED ELSEWHERE, UNSPECIFIED SEVERITY, WITH BEHAVIORAL DISTURBANCE: ICD-10-CM

## 2017-08-09 DIAGNOSIS — S72.491A OTHER FRACTURE OF LOWER END OF RIGHT FEMUR, INITIAL ENCOUNTER FOR CLOSED FRACTURE: ICD-10-CM

## 2017-08-09 DIAGNOSIS — M97.11XA PERIPROSTHETIC FRACTURE AROUND INTERNAL PROSTHETIC RIGHT KNEE JOINT, INITIAL ENCOUNTER: ICD-10-CM
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
## 2017-08-09 DIAGNOSIS — F03.90 UNSPECIFIED DEMENTIA WITHOUT BEHAVIORAL DISTURBANCE: ICD-10-CM

## 2017-08-09 DIAGNOSIS — Z96.653 PRESENCE OF ARTIFICIAL KNEE JOINT, BILATERAL: ICD-10-CM

## 2017-08-09 DIAGNOSIS — B96.20 UNSPECIFIED ESCHERICHIA COLI [E. COLI] AS THE CAUSE OF DISEASES CLASSIFIED ELSEWHERE: ICD-10-CM

## 2017-08-09 DIAGNOSIS — M97.01XA PERIPROSTHETIC FRACTURE AROUND INTERNAL PROSTHETIC RIGHT HIP JOINT, INITIAL ENCOUNTER: ICD-10-CM

## 2017-08-09 DIAGNOSIS — S72.8X1A OTHER FRACTURE OF RIGHT FEMUR, INITIAL ENCOUNTER FOR CLOSED FRACTURE: ICD-10-CM

## 2017-08-09 DIAGNOSIS — D63.1 ANEMIA IN CHRONIC KIDNEY DISEASE: ICD-10-CM

## 2017-08-09 DIAGNOSIS — I12.9 HYPERTENSIVE CHRONIC KIDNEY DISEASE WITH STAGE 1 THROUGH STAGE 4 CHRONIC KIDNEY DISEASE, OR UNSPECIFIED CHRONIC KIDNEY DISEASE: ICD-10-CM

## 2017-08-09 DIAGNOSIS — N39.0 URINARY TRACT INFECTION, SITE NOT SPECIFIED: ICD-10-CM

## 2017-08-09 DIAGNOSIS — T84.53XD INFECTION AND INFLAMMATORY REACTION DUE TO INTERNAL RIGHT KNEE PROSTHESIS, SUBSEQUENT ENCOUNTER: ICD-10-CM

## 2017-08-09 DIAGNOSIS — T83.511A INFECTION AND INFLAMMATORY REACTION DUE TO INDWELLING URETHRAL CATHETER, INITIAL ENCOUNTER: ICD-10-CM

## 2017-08-10 ENCOUNTER — INPATIENT (INPATIENT)
Facility: HOSPITAL | Age: 82
LOS: 3 days | Discharge: NURSING, REHAB OR LONG-TERM CARE | DRG: 683 | End: 2017-08-14
Attending: HOSPITALIST | Admitting: HOSPITALIST
Payer: MEDICARE

## 2017-08-10 VITALS — SYSTOLIC BLOOD PRESSURE: 136 MMHG | HEART RATE: 76 BPM | DIASTOLIC BLOOD PRESSURE: 72 MMHG

## 2017-08-10 DIAGNOSIS — Z29.9 ENCOUNTER FOR PROPHYLACTIC MEASURES, UNSPECIFIED: ICD-10-CM

## 2017-08-10 DIAGNOSIS — N17.9 ACUTE KIDNEY FAILURE, UNSPECIFIED: ICD-10-CM

## 2017-08-10 DIAGNOSIS — Z98.1 ARTHRODESIS STATUS: Chronic | ICD-10-CM

## 2017-08-10 DIAGNOSIS — M25.461 EFFUSION, RIGHT KNEE: ICD-10-CM

## 2017-08-10 DIAGNOSIS — Z92.89 PERSONAL HISTORY OF OTHER MEDICAL TREATMENT: ICD-10-CM

## 2017-08-10 DIAGNOSIS — Z98.890 OTHER SPECIFIED POSTPROCEDURAL STATES: Chronic | ICD-10-CM

## 2017-08-10 DIAGNOSIS — Z96.653 PRESENCE OF ARTIFICIAL KNEE JOINT, BILATERAL: Chronic | ICD-10-CM

## 2017-08-10 DIAGNOSIS — F03.91 UNSPECIFIED DEMENTIA WITH BEHAVIORAL DISTURBANCE: ICD-10-CM

## 2017-08-10 DIAGNOSIS — I10 ESSENTIAL (PRIMARY) HYPERTENSION: ICD-10-CM

## 2017-08-10 LAB
ALBUMIN SERPL ELPH-MCNC: 3.6 G/DL — SIGNIFICANT CHANGE UP (ref 3.3–5)
ALP SERPL-CCNC: 92 U/L — SIGNIFICANT CHANGE UP (ref 40–120)
ALT FLD-CCNC: 17 U/L RC — SIGNIFICANT CHANGE UP (ref 10–45)
ANION GAP SERPL CALC-SCNC: 12 MMOL/L — SIGNIFICANT CHANGE UP (ref 5–17)
APPEARANCE UR: CLEAR — SIGNIFICANT CHANGE UP
APTT BLD: 35.4 SEC — SIGNIFICANT CHANGE UP (ref 27.5–37.4)
AST SERPL-CCNC: 19 U/L — SIGNIFICANT CHANGE UP (ref 10–40)
BACTERIA # UR AUTO: ABNORMAL /HPF
BASOPHILS # BLD AUTO: 0 K/UL — SIGNIFICANT CHANGE UP (ref 0–0.2)
BASOPHILS NFR BLD AUTO: 0.5 % — SIGNIFICANT CHANGE UP (ref 0–2)
BILIRUB SERPL-MCNC: 0.2 MG/DL — SIGNIFICANT CHANGE UP (ref 0.2–1.2)
BILIRUB UR-MCNC: NEGATIVE — SIGNIFICANT CHANGE UP
BUN SERPL-MCNC: 51 MG/DL — HIGH (ref 7–23)
CALCIUM SERPL-MCNC: 9.5 MG/DL — SIGNIFICANT CHANGE UP (ref 8.4–10.5)
CHLORIDE SERPL-SCNC: 105 MMOL/L — SIGNIFICANT CHANGE UP (ref 96–108)
CO2 SERPL-SCNC: 22 MMOL/L — SIGNIFICANT CHANGE UP (ref 22–31)
COLOR SPEC: COLORLESS — SIGNIFICANT CHANGE UP
COMMENT - URINE: SIGNIFICANT CHANGE UP
COMMENT - URINE: SIGNIFICANT CHANGE UP
CREAT SERPL-MCNC: 3.41 MG/DL — HIGH (ref 0.5–1.3)
DIFF PNL FLD: ABNORMAL
EOSINOPHIL # BLD AUTO: 0.6 K/UL — HIGH (ref 0–0.5)
EOSINOPHIL NFR BLD AUTO: 6.6 % — HIGH (ref 0–6)
EPI CELLS # UR: SIGNIFICANT CHANGE UP /HPF
GLUCOSE SERPL-MCNC: 149 MG/DL — HIGH (ref 70–99)
GLUCOSE UR QL: NEGATIVE — SIGNIFICANT CHANGE UP
HCT VFR BLD CALC: 34.1 % — LOW (ref 34.5–45)
HGB BLD-MCNC: 10 G/DL — LOW (ref 11.5–15.5)
INR BLD: 1.04 RATIO — SIGNIFICANT CHANGE UP (ref 0.88–1.16)
KETONES UR-MCNC: NEGATIVE — SIGNIFICANT CHANGE UP
LEUKOCYTE ESTERASE UR-ACNC: ABNORMAL
LYMPHOCYTES # BLD AUTO: 2.5 K/UL — SIGNIFICANT CHANGE UP (ref 1–3.3)
LYMPHOCYTES # BLD AUTO: 27.2 % — SIGNIFICANT CHANGE UP (ref 13–44)
MCHC RBC-ENTMCNC: 28.8 PG — SIGNIFICANT CHANGE UP (ref 27–34)
MCHC RBC-ENTMCNC: 29.2 GM/DL — LOW (ref 32–36)
MCV RBC AUTO: 98.8 FL — SIGNIFICANT CHANGE UP (ref 80–100)
MONOCYTES # BLD AUTO: 0.9 K/UL — SIGNIFICANT CHANGE UP (ref 0–0.9)
MONOCYTES NFR BLD AUTO: 10.2 % — SIGNIFICANT CHANGE UP (ref 2–14)
NEUTROPHILS # BLD AUTO: 5.1 K/UL — SIGNIFICANT CHANGE UP (ref 1.8–7.4)
NEUTROPHILS NFR BLD AUTO: 55.6 % — SIGNIFICANT CHANGE UP (ref 43–77)
NITRITE UR-MCNC: NEGATIVE — SIGNIFICANT CHANGE UP
PH UR: 6 — SIGNIFICANT CHANGE UP (ref 5–8)
PLATELET # BLD AUTO: 389 K/UL — SIGNIFICANT CHANGE UP (ref 150–400)
POTASSIUM SERPL-MCNC: 5.3 MMOL/L — SIGNIFICANT CHANGE UP (ref 3.5–5.3)
POTASSIUM SERPL-SCNC: 5.3 MMOL/L — SIGNIFICANT CHANGE UP (ref 3.5–5.3)
PROT SERPL-MCNC: 8.2 G/DL — SIGNIFICANT CHANGE UP (ref 6–8.3)
PROT UR-MCNC: SIGNIFICANT CHANGE UP
PROTHROM AB SERPL-ACNC: 11.3 SEC — SIGNIFICANT CHANGE UP (ref 9.8–12.7)
RBC # BLD: 3.46 M/UL — LOW (ref 3.8–5.2)
RBC # FLD: 15.6 % — HIGH (ref 10.3–14.5)
RBC CASTS # UR COMP ASSIST: ABNORMAL /HPF (ref 0–2)
SODIUM SERPL-SCNC: 139 MMOL/L — SIGNIFICANT CHANGE UP (ref 135–145)
SP GR SPEC: 1.01 — SIGNIFICANT CHANGE UP (ref 1.01–1.02)
UROBILINOGEN FLD QL: NEGATIVE — SIGNIFICANT CHANGE UP
WBC # BLD: 9.2 K/UL — SIGNIFICANT CHANGE UP (ref 3.8–10.5)
WBC # FLD AUTO: 9.2 K/UL — SIGNIFICANT CHANGE UP (ref 3.8–10.5)
WBC UR QL: >50 /HPF (ref 0–5)

## 2017-08-10 PROCEDURE — 99223 1ST HOSP IP/OBS HIGH 75: CPT

## 2017-08-10 PROCEDURE — 99285 EMERGENCY DEPT VISIT HI MDM: CPT | Mod: GC

## 2017-08-10 RX ORDER — PANTOPRAZOLE SODIUM 20 MG/1
40 TABLET, DELAYED RELEASE ORAL
Qty: 0 | Refills: 0 | Status: DISCONTINUED | OUTPATIENT
Start: 2017-08-10 | End: 2017-08-14

## 2017-08-10 RX ORDER — SODIUM CHLORIDE 9 MG/ML
1000 INJECTION INTRAMUSCULAR; INTRAVENOUS; SUBCUTANEOUS
Qty: 0 | Refills: 0 | Status: DISCONTINUED | OUTPATIENT
Start: 2017-08-10 | End: 2017-08-12

## 2017-08-10 RX ORDER — HEPARIN SODIUM 5000 [USP'U]/ML
5000 INJECTION INTRAVENOUS; SUBCUTANEOUS EVERY 12 HOURS
Qty: 0 | Refills: 0 | Status: DISCONTINUED | OUTPATIENT
Start: 2017-08-10 | End: 2017-08-11

## 2017-08-10 RX ORDER — RISPERIDONE 4 MG/1
0.25 TABLET ORAL AT BEDTIME
Qty: 0 | Refills: 0 | Status: DISCONTINUED | OUTPATIENT
Start: 2017-08-10 | End: 2017-08-12

## 2017-08-10 RX ORDER — SIMVASTATIN 20 MG/1
20 TABLET, FILM COATED ORAL AT BEDTIME
Qty: 0 | Refills: 0 | Status: DISCONTINUED | OUTPATIENT
Start: 2017-08-10 | End: 2017-08-14

## 2017-08-10 RX ORDER — METOPROLOL TARTRATE 50 MG
50 TABLET ORAL
Qty: 0 | Refills: 0 | Status: DISCONTINUED | OUTPATIENT
Start: 2017-08-10 | End: 2017-08-14

## 2017-08-10 RX ORDER — SODIUM CHLORIDE 9 MG/ML
3 INJECTION INTRAMUSCULAR; INTRAVENOUS; SUBCUTANEOUS ONCE
Qty: 0 | Refills: 0 | Status: COMPLETED | OUTPATIENT
Start: 2017-08-10 | End: 2017-08-10

## 2017-08-10 RX ORDER — FERROUS SULFATE 325(65) MG
325 TABLET ORAL DAILY
Qty: 0 | Refills: 0 | Status: DISCONTINUED | OUTPATIENT
Start: 2017-08-10 | End: 2017-08-14

## 2017-08-10 RX ORDER — MIRTAZAPINE 45 MG/1
1 TABLET, ORALLY DISINTEGRATING ORAL
Qty: 0 | Refills: 0 | COMMUNITY

## 2017-08-10 RX ORDER — RISPERIDONE 4 MG/1
0.25 TABLET ORAL EVERY 12 HOURS
Qty: 0 | Refills: 0 | Status: DISCONTINUED | OUTPATIENT
Start: 2017-08-10 | End: 2017-08-12

## 2017-08-10 RX ADMIN — SODIUM CHLORIDE 3 MILLILITER(S): 9 INJECTION INTRAMUSCULAR; INTRAVENOUS; SUBCUTANEOUS at 15:12

## 2017-08-10 RX ADMIN — Medication 50 MILLIGRAM(S): at 19:08

## 2017-08-10 RX ADMIN — RISPERIDONE 0.25 MILLIGRAM(S): 4 TABLET ORAL at 21:51

## 2017-08-10 RX ADMIN — SODIUM CHLORIDE 70 MILLILITER(S): 9 INJECTION INTRAMUSCULAR; INTRAVENOUS; SUBCUTANEOUS at 20:17

## 2017-08-10 RX ADMIN — SIMVASTATIN 20 MILLIGRAM(S): 20 TABLET, FILM COATED ORAL at 21:51

## 2017-08-10 RX ADMIN — HEPARIN SODIUM 5000 UNIT(S): 5000 INJECTION INTRAVENOUS; SUBCUTANEOUS at 21:51

## 2017-08-10 NOTE — ED PROVIDER NOTE - PHYSICAL EXAMINATION
Gen: NAD  Eyes:  sclerae white, no icterus  ENT: Moist mucous membranes. No exudates  Neck: supple, no LAD, mass or goiter, trachea midline  CV: RRR. Audible S1 and S2. No murmurs, rubs, gallops, S3, nor S4  Pulm: Clear to auscultation bilaterally. No wheezes, rales, or rhonchi  Abd: umbilical hernia, No tenderness to palpation  : roque in place  Musculoskeletal:  No edema  Skin: no lesions or scars noted  Psych: mood good, affect full range and congruent with mood.  Neurologic: AAOx3

## 2017-08-10 NOTE — H&P ADULT - PMH
CKD (chronic kidney disease)    Dementia with behavioral disturbance, unspecified dementia type    HTN (hypertension)    Knee effusion, right  chronic knee infection  Neurogenic bladder disorder    Ventral hernia without obstruction or gangrene

## 2017-08-10 NOTE — H&P ADULT - NSHPLABSRESULTS_GEN_ALL_CORE
Labs personally reviewed:                        10.0   9.2   )-----------( 389      ( 10 Aug 2017 15:28 )             34.1     08-10    139  |  105  |  51<H>  ----------------------------<  149<H>  5.3   |  22  |  3.41<H>    Ca    9.5      10 Aug 2017 15:28    TPro  8.2  /  Alb  3.6  /  TBili  0.2  /  DBili  x   /  AST  19  /  ALT  17  /  AlkPhos  92  08-10      PT/INR - ( 10 Aug 2017 15:28 )   PT: 11.3 sec;   INR: 1.04 ratio      PTT - ( 10 Aug 2017 15:28 )  PTT:35.4 sec    Blood Gas Venous - Lactate: 2.0 mmoL/L (08.10.17 @ 15:26)    EKG personally reviewed: NSR QTc 449 Q in lead III

## 2017-08-10 NOTE — H&P ADULT - PROBLEM SELECTOR PLAN 5
monitor knee, WBAT. Per discussion w/family knee is stable right now and further evaluation is not desired

## 2017-08-10 NOTE — H&P ADULT - ASSESSMENT
82F w/CKD 4 one kidney, dementia, neurogenic bladder w/chronic roque, chronic R knee periprosthetic fracture presents w/NELL. Pt had been getting fluid in [place] but differential remains prerenal vs intrinsic renal

## 2017-08-10 NOTE — H&P ADULT - PROBLEM SELECTOR PLAN 1
Dr. Romel Schilling consulted for renal. Renal US, urine eos requested - will order. c/w IVF @ approx 1ml/kg and reassess BMP. check fena.

## 2017-08-10 NOTE — H&P ADULT - HISTORY OF PRESENT ILLNESS
82F w/CKD 4 one kidney, dementia, neurogenic bladder w/chronic roque, chronic R knee periprosthetic fracture presents w/NELL. Pt was in [facility] 2 weeks ago for fall, was treated for UTI and NELL there, creatinine stabilized so pt transferred this week to [facility]. She had not been eating much, was having issues w/delirium requiring initiation of Risperdal. Cr was monitored and noted to be elevated 2d ago, she received IVF and Cr was checked again today was even worse (3 --> 3.8) so pt was transferred to ED. Presently she has no complaints. She is confused. No ab pain, pain at roque site, N/V/D.     Of note pt has a periprosthetic fracture near R TKR. She is in a knee brace but weight bearing as tolerated. She was seen by ortho in [place]. She is not a surgical candidate for revision of TKR due to other comorbidities. She occasionally has an abscess that erupts near the inferior end of the prosthesis and drains to air, then heals. This has happened several times in last 20 mos, she has seen orthopedists and ID physicians. Decision was made to treat conservatively. She has no symptoms at present in her knee.     In ED VSS pt given IVF.

## 2017-08-10 NOTE — ED ADULT NURSE NOTE - OBJECTIVE STATEMENT
t sent from rehab for abnormal lab values pt hx renal disease in rehab post fall with lower r leg fx Immobilizer in place to right leg toes warm mobile pt todd alert with  and daughter at bedside NSR on moniter b/l clear breath sounds pt with no physical complaint examined by md ekg done with labs sent as ordered

## 2017-08-10 NOTE — H&P ADULT - NSHPSOCIALHISTORY_GEN_ALL_CORE
Social History:    Marital Status:  ( X )    (   ) Single    (   )    (  )   Occupation:   Lives with: (  ) alone  (  ) children   (  ) spouse   (  ) parents  ( X) other - SNF, lived at home w/ prior    Substance Use (street drugs): ( x ) never used  (  ) other:  Tobacco Usage:  ( x  ) never smoked   (   ) former smoker   (   ) current smoker  (     ) pack year  (        ) last cigarette date  Alcohol Usage: none  Denies any illicit drug use.

## 2017-08-10 NOTE — H&P ADULT - NSHPPHYSICALEXAM_GEN_ALL_CORE
T(C): 36.9 (08-10-17 @ 17:40), Max: 36.9 (08-10-17 @ 17:40)  HR: 100 (08-10-17 @ 17:40) (76 - 101)  BP: 168/90 (08-10-17 @ 17:40) (136/72 - 168/90)  RR: 17 (08-10-17 @ 17:40) (17 - 18)  SpO2: 97% (08-10-17 @ 17:40) (97% - 97%)  General: NAD, comfortable  Eyes: no conjunctival erythema  ENT: MMM  Neck: Neck supple, No JVD  Respiratory: CTA B/L, No wheezing, rales, rhonchi  CV: RRR no murmurs  Abdominal: Soft, NT, ND +BS. Ventral hernia noted.   MSK: no focal weakness. R knee immobilized, swollen  Extremities: R LE nonpitting edema mostly at knee, 2+ peripheral pulses  Neurology: A&Ox1-2, nonfocal, PARKER x 4  Skin: prior pustular draining rash of inferior R knee is resolved, only darkened skin changes remain  Psych: Calm and appropriate

## 2017-08-10 NOTE — ED PROVIDER NOTE - OBJECTIVE STATEMENT
81 y/o F w/ CKD w/ 1 functional kidney, neurogenic bladder, dementia, presents for [place] rehab, where she was after a fall and had hairline distal femur fx w/ some associated weakness. Creatinine has been rising, today 3.8, baseline 2.5. No fevers, chills, n/v/d.   Nephrologist: Romel Pinedo  primary care physician: Karina Wilson 83 y/o F w/ CKD w/ 1 functional kidney, neurogenic bladder now w/ roque cath, dementia, presents for [place] rehab, where she was after a fall and had hairline distal femur fx w/ some associated weakness. Creatinine has been rising, today 3.8, baseline 2.5. No fevers, chills, n/v/d.   Nephrologist: Romel Pinedo  primary care physician: Karina Wilson 83 y/o F w/ CKD w/ 1 functional kidney, neurogenic bladder now w/ roque cath, dementia, presents for [facility] re[facility], where she was after a fall and had hairline distal femur fx w/ some associated weakness. Creatinine has been rising, today 3.8, baseline 2.5. No fevers, chills, n/v/d.   Nephrologist: Romel Pinedo  primary care physician: Karina Mccray:  Increased creatiine as per outside test.

## 2017-08-10 NOTE — H&P ADULT - PROBLEM SELECTOR PLAN 2
c/w roque for neurogenic bladder - very high threshold to start abx as pt is likely colonized w/bacteria and all UAs will be abnormal and all urine cultures will likely grow something

## 2017-08-10 NOTE — H&P ADULT - NSHPREVIEWOFSYSTEMS_GEN_ALL_CORE
ROS limited due to dementia  CONSTITUTIONAL: No weakness, fevers or chills  ENT: No throat pain. No dysphagia.    RESPIRATORY: No cough, wheezing, hemoptysis; No shortness of breath  CARDIOVASCULAR: No chest pain or palpitations.  GASTROINTESTINAL: No abdominal pain. No nausea or vomiting; No diarrhea or constipation. No melena or hematochezia.  GENITOURINARY: No dysuria, frequency or hematuria. chronic roque  NEUROLOGICAL: recent fall  SKIN: No itching, burning, rashes, or lesions.   LYMPHATIC: No masses or swelling.   All other review of systems is negative unless indicated above.

## 2017-08-10 NOTE — H&P ADULT - REASON FOR ADMISSION
CHIEF COMPLAINT:   Chief Complaint   Patient presents with   • Cough       HISTORY OF PRESENT ILLNESS:   Patient presents for evaluation of a cough nasal and sinus congestion. Symptoms been present for the past 4-5 days. She's had low-grade fevers at home. Pain is in her left maxillary sinus. She states symptoms are similar to month ago when she was here. She continues to smoke although she states she hasn't slept much in the past few days. She reports some mild dyspnea particularly with coughing. No lower extremity edema. No chest pain.    Past Medical History:   Diagnosis Date   • Actinic keratosis    • Alopecia, unspecified 10/95   • Angioma 11/21/2003   • Cardiac dysrhythmia, unspecified 5/85    short IA interval, palpitations, LGL syndrome  Dr. Ricardo   • Dizziness and giddiness 7/31/2007   • DJD (degenerative joint disease)     Hand bilateral   • DJD (degenerative joint disease) of cervical spine    • Endometriosis, site unspecified     Endometriosis   • History of colon polyps 7/12/16    colonic mucosa x 1   • Insomnia, unspecified 2/14/2009   • Lentigo 12/4/2002   • Nevi 12/4/2002   • Onychomycosis    • Other and unspecified hyperlipidemia     mild   • Other forms of migraine, without mention of intractable migraine without mention of status migrainosus    • Palpitations    • Personal history of colonic polyps 08/12/05    colonic mucosa with focal reactaive change. hyperplastic polyp.   • PMH of     pap smaer with HPV done 5/11/11   • PREFERS TO BE SEEN ONLY ANNUALLY    • Rheumatoid arthritis(714.0)    • Seborrheic dermatitis 11/21/2003   • Senile cataract, unspecified 1/26/2010   • SLIGHT ELEVATION GGT, REST OF LFTS NORMAL    • Sprain of lumbosacral (joint) (ligament) 3/01    work related back strain w LLE altered sensation, DOI 3/22/01; hx chronic LBP, chimopapain inj, steroid inj, laminectomy   • Syncope    • Thumb pain 08/24/2015    rt thumb       Past Surgical History:   Procedure Laterality Date   •  APPENDECTOMY  3/74    ovarian cyst   • CATARACT EXTRACTION W/ INTRAOCULAR LENS IMPLANT Left 5/4/16    right eye 4/20/16   • COLONOSCOPY REMOVE LESION BY SNARE  08/12/05    repeat colon 10 yrs.   • COLONOSCOPY REMOVE LESION HOT BX OR CAUTERY  7/12/16    Kluiber rech 10 yr   • COLPOSCOPY BX CERVIX ENDOCERV CURR  5/91    atypical metaplasia   • DESTRUCT INJ RX FACET CERV/THORACIC SINGLE  12/2/2010    Left C5-C6, C6-C7 Facet injections   • DEXA BONE DENSITY AXIAL SKELETON  8/05    Bone density WNL   • ECHOCARDIOGRAM  12/13/2012    EF 66%, mild to mod TR, mild MR   • EMG  10.90    L L5-S1 radiculopathies w paraspinal changes.   • EP STUDY  1/16/2011    normal conduction, no arrhythmias   • HAND/FINGER SURGERY UNLISTED  06/30/2014    Rt wr arth, [facility] tendon decompression & ganglion rem Dr. Dobson   • HAND/FINGER SURGERY UNLISTED  12/15/14    Right Dequervains release Dr Dobson   • HOLTER MONITOR  12/13/2012    SR  bpm, avg 90 bpm. 2 pvcs, 24 pacs, 1 run 3 beats 104 bpm, no sig. pauses   • HYSTEROSCOPY,BIO ENDO/POLYPTMY  12/06   • LAMINECTOMY,LUMBAR  1985    L4-L5 bilateral decompression, bilateral diskectomy  Dr. Veloz   • LAPAROSCOPY,TUBAL CAUTERY  8/82    Tubal Ligation   • MEMORY LOOP WEARABLE  5/31/2011    SR  bpm, palps and fluttering with ST, PAT 3-4 beats   • NECK/CHEST PROCEDURE UNLISTED  4/29/2010    Left c2-c3, c3-c4, c4-5 fACETS   • NECK/CHEST PROCEDURE UNLISTED  8/20/2010    Left TON C3, C4, C5 Medial Branch Block   • NECK/CHEST PROCEDURE UNLISTED  9/17/2010    Left TON C3, C4, C5 RR w/Medial Branch Block   • TILT TABLE  1/16/2011    borderline abnormal on Isuprel, started on Atenolol for suspected neurally mediated syncope       Current Outpatient Prescriptions   Medication Sig Dispense Refill   • Hydrocod Polst-Chlorphen Polst 10-8 MG/5ML Suspension Extended Release Take 5 mLs by mouth nightly. 115 mL 0   • predniSONE (DELTASONE) 5 MG tablet Take 6 pills day 1. Take 5 pills day 2. Take 4 pills  daily 3. Take 3 pills day 4. Take 2 pills day 5. Take 1 pill daily 6. Take with food. 21 tablet 0   • benzonatate (TESSALON) 200 MG capsule Take 1 capsule by mouth every 8 hours as needed for Cough. 30 capsule 0   • azithromycin (ZITHROMAX) 250 MG tablet Take 2 tablet on day 1 then 1 tablet once a day for 4 days. 6 tablet 0   • albuterol 108 (90 BASE) MCG/ACT inhaler Inhale 2 puffs into the lungs every 4 hours as needed for Shortness of Breath or Wheezing. 1 Inhaler 0   • Spacer/Aero Chamber Mouthpiece Misc Use as directed. 1 Device 0   • predniSONE (DELTASONE) 5 MG tablet Take 6 pills day 1. Take 5 pills day 2. Take 4 pills daily 3. Take 3 pills day 4. Take 2 pills day 5. Take 1 pill daily 6. Take with food. 21 tablet 0   • benzonatate (TESSALON) 200 MG capsule Take 1 capsule by mouth every 8 hours as needed for Cough. 30 capsule 0   • potassium chloride 10 MEQ CR tablet Take 1 tablet by mouth daily. 90 tablet 1   • amitriptyline (ELAVIL) 150 MG tablet TAKE 1 TABLET BY MOUTH EVERY NIGHT 90 tablet 1   • methotrexate (RHEUMATREX) 2.5 MG tablet Take 8 tablets by mouth once a week. 96 tablet 1   • meloxicam (MOBIC) 15 MG tablet Take 1 tablet by mouth daily. As needed for pain 90 tablet 1   • traMADOL (ULTRAM) 50 MG tablet Take 1 tablet by mouth every 6 hours as needed for Pain. 120 tablet 2   • rizatriptan (MAXALT) 10 MG tablet TAKE 1 TABLET BY MOUTH AS NEEDED FOR MIGRAINE 36 tablet 0   • folic acid (FOLATE) 1 MG tablet Take 5 tablets by mouth daily. 450 tablet 2   • CALCIUM 600 600 MG PO TABS 1 tablet daily 0 0   • DISPENSE per pt taking Vit D 600 mg -1 tablet daily 0 0   • MULTIPLE VITAMIN PO TABS 1 po daily 0 0   • benzonatate (TESSALON PERLES) 200 MG capsule Take 1 capsule by mouth every 8 hours as needed for Cough. 30 capsule 0   • predniSONE (DELTASONE) 50 MG tablet Take 1 tablet by mouth daily. 4 tablet 0   • doxycycline hyclate (VIBRA-TABS) 100 MG tablet Take 1 tablet by mouth 2 times daily for 7 days. 14  tablet 0     No current facility-administered medications for this visit.        Allergies as of 03/12/2017 - Lavell as Reviewed 03/12/2017   Allergen Reaction Noted   • Amoxicillin NAUSEA and DIARRHEA 04/02/2004   • Naproxen GI UPSET 06/28/2001   • Codeine Nausea & Vomiting 06/19/2014   • Imitrex [sumatriptan base]  06/05/2012   • Oxycodone Nausea & Vomiting 08/28/2015       Social History     Social History   • Marital status:      Spouse name: Alexys   • Number of children: 2   • Years of education: [employer]     Occupational History   •  at [employer] school - retired      Social History Main Topics   • Smoking status: Current Some Day Smoker     Packs/day: 0.50     Years: 10.00     Types: Cigarettes     Last attempt to quit: 7/1/2005   • Smokeless tobacco: Never Used   • Alcohol use No      Comment: Rare   • Drug use: No   • Sexual activity: Not Currently     Partners: Male     Other Topics Concern   •  Service No   • Blood Transfusions No   • Caffeine Concern No     1-2 cups / week   • Sleep Concern No     takes a while to fall asleep   • Stress Concern No   • Weight Concern No   • Special Diet No     all 4 food groups, avoid acid   • Back Care No   • Exercise Yes     walks 3 x / week, 30-35 min   • Seat Belt Yes     Social History Narrative    Born in [place]    Worked as elementary  - now retired    2 kids    Walks 3 x / week, about 30 -35 min each            :    Recently     Lives with 41 yo son    Daughter & her family are supportive           Family History   Problem Relation Age of Onset   • Hypertension Mother    • Diabetes Mother    • Heart Mother      CABG   • Thyroid Mother    • Cancer Father      Pancreatic   • Cancer Maternal Grandmother      breast   • Cancer Maternal Aunt      3 aunts w breast ca   • NEGATIVE FAMILY HX OF Other      No ovarian or colon cancer   • Cancer Sister      Breast cancer, age 66; also uterine   • Heart Sister       ASHD   • Thyroid Sister      Lupus       I have reviewed the past medical history, family history, social history, medications and allergies listed in the medical record as obtained by my nursing staff and support staff and agree with their documentation    REVIEW OF SYSTEMS:  Limited by:none  As per HPI unless otherwise noted      OBJECTIVE:  VITAL SIGNS:      Visit Vitals   • /80 [place] [facility] [place]itting, Cuff Size: Regular)   • Pulse 112   • Temp 97.9 °F (36.6 °C) (Oral)   • Resp 20   • Wt 63.5 kg   • LMP 06/23/2002   • SpO2 100%   • BMI 21.93 kg/m2     GENERAL:  Comfortable, no acute distress, non toxic appearing  HEENT: Normocephalic/atraumatic, extra-ocular muscles intact, PERRLA, maxillary sinus tenderness to palpation       NECK:   Supple, non-tender,  anterior cervical lymphadenopathy appreciated.  RESPIRATORY: Very mild fine end expiratory wheezing bilaterally worse with coughing. No rhonchi or rales  CARDIOVASCULAR: Normal sinus rhythm,  no murmurs/gallops/rubs,   SKIN:  Warm and dry, well perfused.  NEUROLOGICAL:  Alert and awake.   PSYCHIATRIC:  Speech and behavior appropriate.             Clinical Course:    Orders Placed This Encounter   • benzonatate (TESSALON PERLES) 200 MG capsule   • predniSONE (DELTASONE) 50 MG tablet   • Hydrocod Polst-Chlorphen Polst 10-8 MG/5ML Suspension Extended Release   • doxycycline hyclate (VIBRA-TABS) 100 MG tablet       Represcribed Tessalon and cough syrup. Doxycycline for sinusitis. Prednisone for the wheezing. She states she has albuterol still at home. Encouraged her to use 2 puffs every 4-6 hours if needed. followUp with primary care symptoms improving otherwise return precautions discussed.    Trista was seen today for cough.    Diagnoses and all orders for this visit:    Acute sinusitis, recurrence not specified, unspecified location    Bronchitis with bronchospasm    Other orders  -     benzonatate (TESSALON PERLES) 200 MG capsule;  Take 1 capsule by mouth every 8 hours as needed for Cough.  -     predniSONE (DELTASONE) 50 MG tablet; Take 1 tablet by mouth daily.  -     Hydrocod Polst-Chlorphen Polst 10-8 MG/5ML Suspension Extended Release; Take 5 mLs by mouth nightly.  -     doxycycline hyclate (VIBRA-TABS) 100 MG tablet; Take 1 tablet by mouth 2 times daily for 7 days.          Patient was encouraged to follow up with their primary care physician if symptoms are not improving.  Otherwise precautions discussed of when to return or go to the Emergency Department.  Patient expressed understanding and in agreement with the plan and this time.  Questions asked and answered.       Discharge plan: Attached and as above       NELL

## 2017-08-10 NOTE — H&P ADULT - PSH
History of bladder surgery    History of lumbar fusion    Status post total bilateral knee replacement

## 2017-08-11 DIAGNOSIS — N17.9 ACUTE KIDNEY FAILURE, UNSPECIFIED: ICD-10-CM

## 2017-08-11 LAB
ANION GAP SERPL CALC-SCNC: 11 MMOL/L — SIGNIFICANT CHANGE UP (ref 5–17)
BASOPHILS # BLD AUTO: 0.03 K/UL — SIGNIFICANT CHANGE UP (ref 0–0.2)
BASOPHILS NFR BLD AUTO: 0.4 % — SIGNIFICANT CHANGE UP (ref 0–2)
BUN SERPL-MCNC: 40 MG/DL — HIGH (ref 7–23)
CALCIUM SERPL-MCNC: 8.2 MG/DL — LOW (ref 8.4–10.5)
CHLORIDE SERPL-SCNC: 110 MMOL/L — HIGH (ref 96–108)
CO2 SERPL-SCNC: 18 MMOL/L — LOW (ref 22–31)
CREAT ?TM UR-MCNC: 30 MG/DL — SIGNIFICANT CHANGE UP
CREAT SERPL-MCNC: 2.78 MG/DL — HIGH (ref 0.5–1.3)
EOSINOPHIL # BLD AUTO: 0.47 K/UL — SIGNIFICANT CHANGE UP (ref 0–0.5)
EOSINOPHIL NFR BLD AUTO: 6.2 % — HIGH (ref 0–6)
EOSINOPHIL NFR URNS MANUAL: POSITIVE
GLUCOSE SERPL-MCNC: 79 MG/DL — SIGNIFICANT CHANGE UP (ref 70–99)
HCT VFR BLD CALC: 26.9 % — LOW (ref 34.5–45)
HGB BLD-MCNC: 8 G/DL — LOW (ref 11.5–15.5)
IMM GRANULOCYTES NFR BLD AUTO: 3.5 % — HIGH (ref 0–1.5)
LYMPHOCYTES # BLD AUTO: 2.21 K/UL — SIGNIFICANT CHANGE UP (ref 1–3.3)
LYMPHOCYTES # BLD AUTO: 29 % — SIGNIFICANT CHANGE UP (ref 13–44)
MAGNESIUM SERPL-MCNC: 2.3 MG/DL — SIGNIFICANT CHANGE UP (ref 1.6–2.6)
MCHC RBC-ENTMCNC: 28.6 PG — SIGNIFICANT CHANGE UP (ref 27–34)
MCHC RBC-ENTMCNC: 29.7 GM/DL — LOW (ref 32–36)
MCV RBC AUTO: 96.1 FL — SIGNIFICANT CHANGE UP (ref 80–100)
MONOCYTES # BLD AUTO: 0.86 K/UL — SIGNIFICANT CHANGE UP (ref 0–0.9)
MONOCYTES NFR BLD AUTO: 11.3 % — SIGNIFICANT CHANGE UP (ref 2–14)
NEUTROPHILS # BLD AUTO: 3.77 K/UL — SIGNIFICANT CHANGE UP (ref 1.8–7.4)
NEUTROPHILS NFR BLD AUTO: 49.6 % — SIGNIFICANT CHANGE UP (ref 43–77)
PHOSPHATE SERPL-MCNC: 3.8 MG/DL — SIGNIFICANT CHANGE UP (ref 2.5–4.5)
PLATELET # BLD AUTO: 361 K/UL — SIGNIFICANT CHANGE UP (ref 150–400)
POTASSIUM SERPL-MCNC: 5 MMOL/L — SIGNIFICANT CHANGE UP (ref 3.5–5.3)
POTASSIUM SERPL-SCNC: 5 MMOL/L — SIGNIFICANT CHANGE UP (ref 3.5–5.3)
RBC # BLD: 2.8 M/UL — LOW (ref 3.8–5.2)
RBC # FLD: 16.7 % — HIGH (ref 10.3–14.5)
SODIUM SERPL-SCNC: 139 MMOL/L — SIGNIFICANT CHANGE UP (ref 135–145)
SODIUM UR-SCNC: 78 MMOL/L — SIGNIFICANT CHANGE UP
TSH SERPL-MCNC: 5.58 UIU/ML — HIGH (ref 0.27–4.2)
WBC # BLD: 7.61 K/UL — SIGNIFICANT CHANGE UP (ref 3.8–10.5)
WBC # FLD AUTO: 7.61 K/UL — SIGNIFICANT CHANGE UP (ref 3.8–10.5)

## 2017-08-11 PROCEDURE — 76775 US EXAM ABDO BACK WALL LIM: CPT | Mod: 26

## 2017-08-11 PROCEDURE — 99233 SBSQ HOSP IP/OBS HIGH 50: CPT | Mod: GC

## 2017-08-11 RX ORDER — HEPARIN SODIUM 5000 [USP'U]/ML
5000 INJECTION INTRAVENOUS; SUBCUTANEOUS EVERY 12 HOURS
Qty: 0 | Refills: 0 | Status: DISCONTINUED | OUTPATIENT
Start: 2017-08-11 | End: 2017-08-14

## 2017-08-11 RX ORDER — ENOXAPARIN SODIUM 100 MG/ML
30 INJECTION SUBCUTANEOUS EVERY 24 HOURS
Qty: 0 | Refills: 0 | Status: DISCONTINUED | OUTPATIENT
Start: 2017-08-11 | End: 2017-08-11

## 2017-08-11 RX ADMIN — Medication 325 MILLIGRAM(S): at 13:18

## 2017-08-11 RX ADMIN — PANTOPRAZOLE SODIUM 40 MILLIGRAM(S): 20 TABLET, DELAYED RELEASE ORAL at 05:45

## 2017-08-11 RX ADMIN — SIMVASTATIN 20 MILLIGRAM(S): 20 TABLET, FILM COATED ORAL at 21:01

## 2017-08-11 RX ADMIN — SODIUM CHLORIDE 70 MILLILITER(S): 9 INJECTION INTRAMUSCULAR; INTRAVENOUS; SUBCUTANEOUS at 18:14

## 2017-08-11 RX ADMIN — Medication 50 MILLIGRAM(S): at 05:45

## 2017-08-11 RX ADMIN — HEPARIN SODIUM 5000 UNIT(S): 5000 INJECTION INTRAVENOUS; SUBCUTANEOUS at 18:12

## 2017-08-11 RX ADMIN — Medication 50 MILLIGRAM(S): at 18:12

## 2017-08-11 RX ADMIN — HEPARIN SODIUM 5000 UNIT(S): 5000 INJECTION INTRAVENOUS; SUBCUTANEOUS at 05:44

## 2017-08-11 RX ADMIN — RISPERIDONE 0.25 MILLIGRAM(S): 4 TABLET ORAL at 21:01

## 2017-08-11 NOTE — CONSULT NOTE ADULT - ASSESSMENT
82F w/CKD 4 one kidney, dementia, neurogenic bladder w/chronic roque, chronic R knee periprosthetic fracture presents w/NELL.  in rehab Cr was  noted to be elevated and worsening without improvement in creatinine after ivf. sent to er  1- CKD IV with NELL  2- anemia  3- htn     cr improving with ivf. cont ivf for another day  cont with metoprolol 50mg bid  risperidol for her dementia/agitation  procrit [id number] units sq biw  d/w primary team   d/w pt

## 2017-08-11 NOTE — PROGRESS NOTE ADULT - PROBLEM SELECTOR PLAN 1
-Likely pre-renal in setting of CKD IV  -C/w with IVF  -Monitor BMP  -Encourage oral hydration  -Nephro following and in agreement with management  -F/u renal U/S  -D/w  at beside -Likely pre-renal in setting of CKD IV  -C/w with IVF  -Monitor BMP  -Encourage oral hydration  -Nephro following and in agreement with management  -F/u renal U/S  -D/w  at beside  -D/W nephrology  -Asymptomatic bacteruria likely due to chronic roque, monitor off of antibiotic

## 2017-08-11 NOTE — PROGRESS NOTE ADULT - ASSESSMENT
82F w/CKD 4 one kidney, dementia, neurogenic bladder w/chronic roque, chronic R knee periprosthetic fracture presents w/NELL. Pt had been getting fluid in [facility] but with no improvement in renal function.

## 2017-08-11 NOTE — PROGRESS NOTE ADULT - SUBJECTIVE AND OBJECTIVE BOX
RADHA CHAUDHRY  MRN-[id number]    Chief Complain:Patient is a 82y old  Female who presents with a chief complaint of NELL (10 Aug 2017 17:58)      SUBJECTIVE / OVERNIGHT EVENTS: No events    Review of Systems:  14 point ROS negative in detail except for the above:     MEDICATIONS  (STANDING):  sodium chloride 0.9%. 1000 milliLiter(s) (70 mL/Hr) IV Continuous <Continuous>  simvastatin 20 milliGRAM(s) Oral at bedtime  risperiDONE   Tablet 0.25 milliGRAM(s) Oral at bedtime  metoprolol 50 milliGRAM(s) Oral two times a day  ferrous    sulfate 325 milliGRAM(s) Oral daily  pantoprazole    Tablet 40 milliGRAM(s) Oral before breakfast  heparin  Injectable 5000 Unit(s) SubCutaneous every 12 hours    MEDICATIONS  (PRN):  risperiDONE   Tablet 0.25 milliGRAM(s) Oral every 12 hours PRN agitation      I&O's Summary    10 Aug 2017 07:  -  11 Aug 2017 07:00  --------------------------------------------------------  IN: 1100 mL / OUT: 1050 mL / NET: 50 mL    11 Aug 2017 07:  -  11 Aug 2017 13:23  --------------------------------------------------------  IN: 360 mL / OUT: 300 mL / NET: 60 mL        Allergies    No Known Allergies    Intolerances      PHYSICAL EXAM:  GENERAL: Not in distress, well-developed  HEAD:  Atraumatic, Normocephalic  EYES: EOMI, PERRLA, conjunctiva and sclera clear  NECK: Supple, No JVD  CHEST/LUNG: Clear to auscultation bilaterally; No wheeze  HEART: Regular rate and rhythm; No murmurs, rubs, or edema  ABDOMEN: Soft, Nontender, Nondistended; Bowel sounds present  EXTREMITIES: No joint effusions, good muscle tone and bulk. Right knee brace in place  PSYCH: Normal mood and affect, alert and oriented  NEUROLOGY: Grossly intact   SKIN: No rashes or lesions    LABS:                        8.0    7.61  )-----------( 361      ( 11 Aug 2017 07:12 )             26.9     08-11    139  |  110<H>  |  40<H>  ----------------------------<  79  5.0   |  18<L>  |  2.78<H>    Ca    8.2<L>      11 Aug 2017 07:12  Phos  3.8     08-  Mg     2.3     08-    TPro  8.2  /  Alb  3.6  /  TBili  0.2  /  DBili  x   /  AST  19  /  ALT  17  /  AlkPhos  92  08-10    LIVER FUNCTIONS - ( 10 Aug 2017 15:28 )  Alb: 3.6 g/dL / Pro: 8.2 g/dL / ALK PHOS: 92 U/L / ALT: 17 U/L RC / AST: 19 U/L / GGT: x           PT/INR - ( 10 Aug 2017 15:28 )   PT: 11.3 sec;   INR: 1.04 ratio         PTT - ( 10 Aug 2017 15:28 )  PTT:35.4 sec      Urinalysis Basic - ( 10 Aug 2017 17:33 )    Color: x / Appearance: Clear / S.011 / pH: x  Gluc: x / Ketone: Negative  / Bili: Negative / Urobili: Negative   Blood: x / Protein: Trace / Nitrite: Negative   Leuk Esterase: Large / RBC: 2-5 /HPF / WBC >50 /HPF   Sq Epi: x / Non Sq Epi: x / Bacteria: Few /HPF      RADIOLOGY & ADDITIONAL TESTS:    EKG/Telemetry:< from: 12 Lead ECG (08.10.17 @ 14:50) >  NORMAL SINUS RHYTHM      Consultant(s) Notes Reviewed:  nephro    Care Discussed with Consultants/Other Providers: nephro    The above recommendations were discussed with the patient/family. The patient/family had all questions answered and expressed understanding of the plan.

## 2017-08-11 NOTE — CONSULT NOTE ADULT - SUBJECTIVE AND OBJECTIVE BOX
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 Pt was in [facility] 2 weeks ago for fall, was treated for UTI and NELL there, creatinine stabilized so pt transferred this week to [facility]. She had not been eating much, was having issues w/delirium requiring initiation of Risperdal. Cr was monitored and noted to be elevated 2d ago, she received IVF and Cr was checked again today was even worse (3 --> 3.8) so pt was transferred to ED. Presently she has no complaints. She is confused. No ab pain, pain at roque site, N/V/D.     PAST HISTORY  --------------------------------------------------------------------------------  PAST MEDICAL & SURGICAL HISTORY:  Ventral hernia without obstruction or gangrene  Dementia with behavioral disturbance, unspecified dementia type  Knee effusion, right: chronic knee infection  HTN (hypertension)  Neurogenic bladder disorder  CKD (chronic kidney disease)  History of bladder surgery  History of lumbar fusion  Status post total bilateral knee replacement    FAMILY HISTORY:  Family history of acute myocardial infarction (Father)    PAST SOCIAL HISTORY:  no tobacco    ALLERGIES & MEDICATIONS  --------------------------------------------------------------------------------  Allergies    No Known Allergies    Intolerances      Standing Inpatient Medications  sodium chloride 0.9%. 1000 milliLiter(s) IV Continuous <Continuous>  simvastatin 20 milliGRAM(s) Oral at bedtime  risperiDONE   Tablet 0.25 milliGRAM(s) Oral at bedtime  metoprolol 50 milliGRAM(s) Oral two times a day  ferrous    sulfate 325 milliGRAM(s) Oral daily  pantoprazole    Tablet 40 milliGRAM(s) Oral before breakfast  heparin  Injectable 5000 Unit(s) SubCutaneous every 12 hours    PRN Inpatient Medications  risperiDONE   Tablet 0.25 milliGRAM(s) Oral every 12 hours PRN      REVIEW OF SYSTEMS  --------------------------------------------------------------------------------    reliability poor  Gen:+fatigue, - fevers/chills, + weakness  Skin: No rashes  Head/Eyes/Ears/Mouth: No headache; Normal hearing; Normal vision w/o blurriness; No sinus pain/discomfort, sore throat  Respiratory: No dyspnea, cough, wheezing, hemoptysis  CV: No chest pain, PND, orthopnea  GI: No abdominal pain, diarrhea, constipation, nausea, vomiting, melena, hematochezia  : no  dysuria, hematuria, has roque   MSK: No joint pain/swelling; no back pain; no edema  Neuro: No dizziness/lightheadedness,       All other systems were reviewed and are negative, except as noted.    VITALS/PHYSICAL EXAM  --------------------------------------------------------------------------------  T(C): 37.1 (08-11-17 @ 04:05), Max: 37.1 (08-10-17 @ 18:00)  HR: 96 (08-11-17 @ 04:05) (76 - 113)  BP: 158/78 (08-11-17 @ 04:05) (136/72 - 179/90)  RR: 18 (08-11-17 @ 04:05) (17 - 18)  SpO2: 96% (08-11-17 @ 04:05) (96% - 98%)  Wt(kg): --  Height (cm): 149.86 (08-10-17 @ 18:00)  Weight (kg): 56.2 (08-10-17 @ 18:00)  BMI (kg/m2): 25 (08-10-17 @ 18:00)  BSA (m2): 1.5 (08-10-17 @ 18:00)      08-10-17 @ 07:01  -  08-11-17 @ 07:00  --------------------------------------------------------  IN: 1100 mL / OUT: 1050 mL / NET: 50 mL      Physical Exam:  	Gen: dementia. but communicative   	no jvd supple neck,   	Pulm: CTA B/L no ronchi or rales   	CV: RRR, S1S2; no rub  	Back: No spinal or CVA tenderness; no sacral edema  	Abd: +BS, soft, nontender/nondistended  	: No suprapubic tenderness  	LE: Warm,brace RLE no clubbing,no edema  	Neuro:dementia forgetful. oriented to place and person states in rehab still   	Skin: decrease skin turgor  	  LABS/STUDIES  --------------------------------------------------------------------------------              8.0    7.61  >-----------<  361      [08-11-17 @ 07:12]              26.9     139  |  110  |  40  ----------------------------<  79      [08-11-17 @ 07:12]  5.0   |  18  |  2.78        Ca     8.2     [08-11-17 @ 07:12]      Mg     2.3     [08-11-17 @ 07:12]      Phos  3.8     [08-11-17 @ 07:12]    TPro  8.2  /  Alb  3.6  /  TBili  0.2  /  DBili  x   /  AST  19  /  ALT  17  /  AlkPhos  92  [08-10-17 @ 15:28]    PT/INR: PT 11.3 , INR 1.04       [08-10-17 @ 15:28]  PTT: 35.4       [08-10-17 @ 15:28]      Creatinine Trend:  SCr 2.78 [08-11 @ 07:12]  SCr 3.41 [08-10 @ 15:28]    Urinalysis - [08-10-17 @ 17:33]      Color  / Appearance Clear / SG 1.011 / pH 6.0      Gluc Negative / Ketone Negative  / Bili Negative / Urobili Negative       Blood Trace / Protein Trace / Leuk Est Large / Nitrite Negative      RBC 2-5 / WBC >50 / Hyaline  / Gran  / Sq Epi  / Non Sq Epi  / Bacteria Few    Urine Creatinine 30      [08-11-17 @ 07:12]  Urine Sodium 78      [08-11-17 @ 07:12]    PTH -- (Ca 8.9)      [05-05-17 @ 07:21]   32  Vitamin D (25OH) 64.4      [05-05-17 @ 07:21]

## 2017-08-12 DIAGNOSIS — R40.0 SOMNOLENCE: ICD-10-CM

## 2017-08-12 DIAGNOSIS — R53.81 OTHER MALAISE: ICD-10-CM

## 2017-08-12 LAB
ALBUMIN SERPL ELPH-MCNC: 2.5 G/DL — LOW (ref 3.3–5)
ALP SERPL-CCNC: 75 U/L — SIGNIFICANT CHANGE UP (ref 40–120)
ALT FLD-CCNC: 15 U/L — SIGNIFICANT CHANGE UP (ref 10–45)
ANION GAP SERPL CALC-SCNC: 13 MMOL/L — SIGNIFICANT CHANGE UP (ref 5–17)
AST SERPL-CCNC: 17 U/L — SIGNIFICANT CHANGE UP (ref 10–40)
BASOPHILS # BLD AUTO: 0.01 K/UL — SIGNIFICANT CHANGE UP (ref 0–0.2)
BASOPHILS NFR BLD AUTO: 0.1 % — SIGNIFICANT CHANGE UP (ref 0–2)
BILIRUB SERPL-MCNC: 0.2 MG/DL — SIGNIFICANT CHANGE UP (ref 0.2–1.2)
BUN SERPL-MCNC: 36 MG/DL — HIGH (ref 7–23)
CALCIUM SERPL-MCNC: 9.2 MG/DL — SIGNIFICANT CHANGE UP (ref 8.4–10.5)
CHLORIDE SERPL-SCNC: 111 MMOL/L — HIGH (ref 96–108)
CO2 SERPL-SCNC: 16 MMOL/L — LOW (ref 22–31)
CREAT SERPL-MCNC: 2.7 MG/DL — HIGH (ref 0.5–1.3)
EOSINOPHIL # BLD AUTO: 0.47 K/UL — SIGNIFICANT CHANGE UP (ref 0–0.5)
EOSINOPHIL NFR BLD AUTO: 6.3 % — HIGH (ref 0–6)
GLUCOSE SERPL-MCNC: 83 MG/DL — SIGNIFICANT CHANGE UP (ref 70–99)
HCT VFR BLD CALC: 27.4 % — LOW (ref 34.5–45)
HGB BLD-MCNC: 8 G/DL — LOW (ref 11.5–15.5)
IMM GRANULOCYTES NFR BLD AUTO: 3.9 % — HIGH (ref 0–1.5)
LYMPHOCYTES # BLD AUTO: 2.3 K/UL — SIGNIFICANT CHANGE UP (ref 1–3.3)
LYMPHOCYTES # BLD AUTO: 30.7 % — SIGNIFICANT CHANGE UP (ref 13–44)
MAGNESIUM SERPL-MCNC: 2.1 MG/DL — SIGNIFICANT CHANGE UP (ref 1.6–2.6)
MCHC RBC-ENTMCNC: 28 PG — SIGNIFICANT CHANGE UP (ref 27–34)
MCHC RBC-ENTMCNC: 29.2 GM/DL — LOW (ref 32–36)
MCV RBC AUTO: 95.8 FL — SIGNIFICANT CHANGE UP (ref 80–100)
MONOCYTES # BLD AUTO: 0.95 K/UL — HIGH (ref 0–0.9)
MONOCYTES NFR BLD AUTO: 12.7 % — SIGNIFICANT CHANGE UP (ref 2–14)
NEUTROPHILS # BLD AUTO: 3.48 K/UL — SIGNIFICANT CHANGE UP (ref 1.8–7.4)
NEUTROPHILS NFR BLD AUTO: 46.3 % — SIGNIFICANT CHANGE UP (ref 43–77)
PHOSPHATE SERPL-MCNC: 4.1 MG/DL — SIGNIFICANT CHANGE UP (ref 2.5–4.5)
PLATELET # BLD AUTO: 357 K/UL — SIGNIFICANT CHANGE UP (ref 150–400)
POTASSIUM SERPL-MCNC: 5.2 MMOL/L — SIGNIFICANT CHANGE UP (ref 3.5–5.3)
POTASSIUM SERPL-SCNC: 5.2 MMOL/L — SIGNIFICANT CHANGE UP (ref 3.5–5.3)
PROT SERPL-MCNC: 6.7 G/DL — SIGNIFICANT CHANGE UP (ref 6–8.3)
RBC # BLD: 2.86 M/UL — LOW (ref 3.8–5.2)
RBC # FLD: 16.7 % — HIGH (ref 10.3–14.5)
SODIUM SERPL-SCNC: 140 MMOL/L — SIGNIFICANT CHANGE UP (ref 135–145)
WBC # BLD: 7.5 K/UL — SIGNIFICANT CHANGE UP (ref 3.8–10.5)
WBC # FLD AUTO: 7.5 K/UL — SIGNIFICANT CHANGE UP (ref 3.8–10.5)

## 2017-08-12 PROCEDURE — 99232 SBSQ HOSP IP/OBS MODERATE 35: CPT | Mod: GC

## 2017-08-12 RX ORDER — SODIUM BICARBONATE 1 MEQ/ML
0.07 SYRINGE (ML) INTRAVENOUS
Qty: 75 | Refills: 0 | Status: DISCONTINUED | OUTPATIENT
Start: 2017-08-12 | End: 2017-08-13

## 2017-08-12 RX ORDER — RISPERIDONE 4 MG/1
0.12 TABLET ORAL AT BEDTIME
Qty: 0 | Refills: 0 | Status: DISCONTINUED | OUTPATIENT
Start: 2017-08-12 | End: 2017-08-14

## 2017-08-12 RX ADMIN — Medication 50 MILLIGRAM(S): at 05:27

## 2017-08-12 RX ADMIN — PANTOPRAZOLE SODIUM 40 MILLIGRAM(S): 20 TABLET, DELAYED RELEASE ORAL at 05:27

## 2017-08-12 RX ADMIN — Medication 325 MILLIGRAM(S): at 11:03

## 2017-08-12 RX ADMIN — Medication 50 MEQ/KG/HR: at 17:59

## 2017-08-12 RX ADMIN — HEPARIN SODIUM 5000 UNIT(S): 5000 INJECTION INTRAVENOUS; SUBCUTANEOUS at 05:27

## 2017-08-12 RX ADMIN — HEPARIN SODIUM 5000 UNIT(S): 5000 INJECTION INTRAVENOUS; SUBCUTANEOUS at 17:47

## 2017-08-12 RX ADMIN — Medication 50 MILLIGRAM(S): at 17:47

## 2017-08-12 RX ADMIN — SIMVASTATIN 20 MILLIGRAM(S): 20 TABLET, FILM COATED ORAL at 22:01

## 2017-08-12 NOTE — PROGRESS NOTE ADULT - PROBLEM SELECTOR PLAN 1
-Likely pre-renal in setting of CKD IV  -D/C IVF  -Monitor BMP  -Encourage oral hydration  -Nephro following and in agreement with management  -D/W nephrology  -Asymptomatic bacteruria likely due to chronic roque, monitor off of antibiotic

## 2017-08-12 NOTE — CHART NOTE - NSCHARTNOTEFT_GEN_A_CORE
82y   Female      Ventral hernia without obstruction or gangrene  Dementia with behavioral disturbance, unspecified dementia type  Knee effusion, right  HTN (hypertension)  Neurogenic bladder disorder  CKD (chronic kidney disease)  NELL (acute kidney injury)  Somnolence, daytime  Physical deconditioning  Acute on chronic renal failure    Chronic indwelling Chris catheter  NELL (acute kidney injury)  History of bladder surgery  History of lumbar fusion  Status post total bilateral knee replacement    Meds:  simvastatin 20 milliGRAM(s) Oral at bedtime  metoprolol 50 milliGRAM(s) Oral two times a day  ferrous    sulfate 325 milliGRAM(s) Oral daily  pantoprazole    Tablet 40 milliGRAM(s) Oral before breakfast  heparin  Injectable 5000 Unit(s) SubCutaneous every 12 hours  sodium bicarbonate  Infusion 0.067 mEq/kG/Hr IV Continuous <Continuous>  risperiDONE   Solution 0.125 milliGRAM(s) Oral at bedtime PRN    T(C): 36.8 (08-12-17 @ 17:45), Max: 37.1 (08-11-17 @ 19:33)  HR: 98 (08-12-17 @ 17:45) (72 - 98)  BP: 151/70 (08-12-17 @ 17:45) (123/75 - 151/70)  RR: 18 (08-12-17 @ 17:45) (18 - 18)  SpO2: 94% (08-12-17 @ 17:45) (93% - 98%)    Called by nurse an hour and a half ago for the daughter concerned patient sleepy even before PT. Labs within normal limits  Examined with PERRLA 2-3mm. moving all extremities with brace on the right leg    Plan: will decrease Risperidone to 0.125 mg qHS PRN for agitation and monitor  Daughter questioned Mirtazapine 30mg which patient takes PTA  Will monitor for now and discuss with the hospitalist      Plan:

## 2017-08-12 NOTE — PROGRESS NOTE ADULT - SUBJECTIVE AND OBJECTIVE BOX
RADHA CHAUDHRY  MRN-[id number]    Chief Complain:Patient is a 82y old  Female who presents with a chief complaint of NELL (10 Aug 2017 17:58)      SUBJECTIVE / OVERNIGHT EVENTS:  Patient seen and examined at bedside. Patient denies any pain, no nausea, no vomiting.     Review of Systems:  14 point ROS negative in detail except for the above:     MEDICATIONS  (STANDING):  simvastatin 20 milliGRAM(s) Oral at bedtime  risperiDONE   Tablet 0.25 milliGRAM(s) Oral at bedtime  metoprolol 50 milliGRAM(s) Oral two times a day  ferrous    sulfate 325 milliGRAM(s) Oral daily  pantoprazole    Tablet 40 milliGRAM(s) Oral before breakfast  heparin  Injectable 5000 Unit(s) SubCutaneous every 12 hours    MEDICATIONS  (PRN):  risperiDONE   Tablet 0.25 milliGRAM(s) Oral every 12 hours PRN agitation      T(C): 37 (17 @ 05:23), Max: 37.1 (17 @ 19:33)  HR: 72 (17 @ 05:23) (72 - 97)  BP: 143/77 (17 @ 05:23) (123/75 - 146/80)  RR: 18 (17 @ 05:23) (18 - 18)  SpO2: 93% (17 @ 05:23) (93% - 99%)    CAPILLARY BLOOD GLUCOSE    I&O's Summary    11 Aug 2017 07:  -  12 Aug 2017 07:00  --------------------------------------------------------  IN: 2390 mL / OUT: 1400 mL / NET: 990 mL    12 Aug 2017 07:  -  12 Aug 2017 10:32  --------------------------------------------------------  IN: 150 mL / OUT: 0 mL / NET: 150 mL    Allergies    No Known Allergies    Intolerances        PHYSICAL EXAM:  GENERAL: Not in distress, well-developed  HEAD:  Atraumatic, Normocephalic  EYES: EOMI, PERRLA, conjunctiva and sclera clear  NECK: Supple, No JVD  CHEST/LUNG: Clear to auscultation bilaterally; No wheeze  HEART: Regular rate and rhythm; No murmurs, rubs, or edema  ABDOMEN: Soft, Nontender, Nondistended; Bowel sounds present  EXTREMITIES: No joint effusions, good muscle tone and bulk  PSYCH: Normal mood and affect, alert and oriented  NEUROLOGY: Grossly intact   SKIN: No rashes or lesions    LABS:                        8.0    7.50  )-----------( 357      ( 12 Aug 2017 08:39 )             27.4     08-12    140  |  111<H>  |  36<H>  ----------------------------<  83  5.2   |  16<L>  |  2.70<H>    Ca    9.2      12 Aug 2017 08:48  Phos  4.1     08-12  Mg     2.1     08-12    TPro  6.7  /  Alb  2.5<L>  /  TBili  0.2  /  DBili  x   /  AST  17  /  ALT  15  /  AlkPhos  75  08-12    LIVER FUNCTIONS - ( 12 Aug 2017 08:48 )  Alb: 2.5 g/dL / Pro: 6.7 g/dL / ALK PHOS: 75 U/L / ALT: 15 U/L / AST: 17 U/L / GGT: x           PT/INR - ( 10 Aug 2017 15:28 )   PT: 11.3 sec;   INR: 1.04 ratio         PTT - ( 10 Aug 2017 15:28 )  PTT:35.4 sec      Urinalysis Basic - ( 10 Aug 2017 17:33 )    Color: x / Appearance: Clear / S.011 / pH: x  Gluc: x / Ketone: Negative  / Bili: Negative / Urobili: Negative   Blood: x / Protein: Trace / Nitrite: Negative   Leuk Esterase: Large / RBC: 2-5 /HPF / WBC >50 /HPF   Sq Epi: x / Non Sq Epi: x / Bacteria: Few /HPF      Blood Cultures        RADIOLOGY & ADDITIONAL TESTS:    Imaging:    EKG/Telemetry:    Consultant(s) Notes Reviewed:      Care Discussed with Consultants/Other Providers:    The above recommendations were discussed with the patient/family. The patient/family had all questions answered and expressed understanding of the plan. RADHA CHAUDHRY  MRN-[id number]    Chief Complain:Patient is a 82y old  Female who presents with a chief complaint of NELL (10 Aug 2017 17:58)      SUBJECTIVE / OVERNIGHT EVENTS:  Patient seen and examined at bedside. Patient denies any pain, no nausea, no vomiting.     Review of Systems:  14 point ROS negative in detail except for the above:     MEDICATIONS  (STANDING):  simvastatin 20 milliGRAM(s) Oral at bedtime  risperiDONE   Tablet 0.25 milliGRAM(s) Oral at bedtime  metoprolol 50 milliGRAM(s) Oral two times a day  ferrous    sulfate 325 milliGRAM(s) Oral daily  pantoprazole    Tablet 40 milliGRAM(s) Oral before breakfast  heparin  Injectable 5000 Unit(s) SubCutaneous every 12 hours    MEDICATIONS  (PRN):  risperiDONE   Tablet 0.25 milliGRAM(s) Oral every 12 hours PRN agitation      T(C): 37 (17 @ 05:23), Max: 37.1 (17 @ 19:33)  HR: 72 (17 @ 05:23) (72 - 97)  BP: 143/77 (17 @ 05:23) (123/75 - 146/80)  RR: 18 (17 @ 05:23) (18 - 18)  SpO2: 93% (17 @ 05:23) (93% - 99%)    CAPILLARY BLOOD GLUCOSE    I&O's Summary    11 Aug 2017 07:  -  12 Aug 2017 07:00  --------------------------------------------------------  IN: 2390 mL / OUT: 1400 mL / NET: 990 mL    12 Aug 2017 07:  -  12 Aug 2017 10:32  --------------------------------------------------------  IN: 150 mL / OUT: 0 mL / NET: 150 mL    Allergies    No Known Allergies    Intolerances        PHYSICAL EXAM:  GENERAL: Not in distress, well-developed  HEAD:  Atraumatic, Normocephalic  EYES: EOMI, PERRLA, conjunctiva and sclera clear  NECK: Supple, No JVD  CHEST/LUNG: Clear to auscultation bilaterally; No wheeze  HEART: Regular rate and rhythm; systolic murmurs  ABDOMEN: Soft, Nontender, Nondistended; Bowel sounds present  EXTREMITIES: No joint effusions, good muscle tone and bulk. Right knee brace in place  PSYCH: Normal mood and affect  NEUROLOGY: Grossly intact   SKIN: No rashes or lesions    LABS:                        8.0    7.50  )-----------( 357      ( 12 Aug 2017 08:39 )             27.4     08-12    140  |  111<H>  |  36<H>  ----------------------------<  83  5.2   |  16<L>  |  2.70<H>    Ca    9.2      12 Aug 2017 08:48  Phos  4.1       Mg     2.1     -12    TPro  6.7  /  Alb  2.5<L>  /  TBili  0.2  /  DBili  x   /  AST  17  /  ALT  15  /  AlkPhos  75  08-12    LIVER FUNCTIONS - ( 12 Aug 2017 08:48 )  Alb: 2.5 g/dL / Pro: 6.7 g/dL / ALK PHOS: 75 U/L / ALT: 15 U/L / AST: 17 U/L / GGT: x           PT/INR - ( 10 Aug 2017 15:28 )   PT: 11.3 sec;   INR: 1.04 ratio         PTT - ( 10 Aug 2017 15:28 )  PTT:35.4 sec      Urinalysis Basic - ( 10 Aug 2017 17:33 )    Color: x / Appearance: Clear / S.011 / pH: x  Gluc: x / Ketone: Negative  / Bili: Negative / Urobili: Negative   Blood: x / Protein: Trace / Nitrite: Negative   Leuk Esterase: Large / RBC: 2-5 /HPF / WBC >50 /HPF   Sq Epi: x / Non Sq Epi: x / Bacteria: Few /HPF      RADIOLOGY & ADDITIONAL TESTS:    Imaging:  < from: US Renal (17 @ 12:19) >  Right kidney:  Atrophic, measuring 7.8 cm. No hydronephrosis. Increased   cortical echogenicity.    Left kidney:  Atrophic, measuring 8.3 cm. No hydronephrosis. Increased   cortical echogenicity.    Urinary bladder: Collapsed around a Chris catheter.    IMPRESSION:     Atrophic kidneys bilaterally with increased cortical echogenicity, which   may be secondary to medical renal disease. No hydronephrosis.    < end of copied text >    Consultant(s) Notes Reviewed:  Nephrology    Care Discussed with Consultants/Other Providers: Nephrology, PCP

## 2017-08-12 NOTE — DIETITIAN INITIAL EVALUATION ADULT. - ENERGY NEEDS
Ht: 59"   Wt:124   BMI: 25 kg/m2   IBW: 98 (+/-10%)    126 % IBW  Edema: 2+ right knee   Skin: no pressure ulcers

## 2017-08-12 NOTE — PHYSICAL THERAPY INITIAL EVALUATION ADULT - PERTINENT HX OF CURRENT PROBLEM, REHAB EVAL
Pt is an 83 y/o female w/CKD 4 one kidney, dementia, neurogenic bladder w/chronic roque, chronic R knee periprosthetic fracture presents w/NELL. Pt was in [facility] 2 weeks ago for fall, was treated for UTI and NELL there, creatinine stabilized so pt transferred this week to [facility]. She had not been eating much, was having issues w/delirium requiring initiation of Risperdal.  Continued...

## 2017-08-12 NOTE — PHYSICAL THERAPY INITIAL EVALUATION ADULT - IMPAIRMENTS CONTRIBUTING TO GAIT DEVIATIONS, PT EVAL
narrow base of support/pain/impaired postural control/Pt required verbal cuing for sequencing of gait. Pt anxious/unsteady with ambulation, initially listing posteriorly, required verbal and tactile cuing to stand upright./impaired balance/decreased strength

## 2017-08-12 NOTE — PHYSICAL THERAPY INITIAL EVALUATION ADULT - ADDITIONAL COMMENTS
pertinent history continued...  Cr was monitored and noted to be elevated 2d ago, she received IVF and Cr was checked again today was even worse (3 --> 3.8) so pt was transferred to ED. pertinent history continued...  Cr was monitored and noted to be elevated 2d ago, she received IVF and Cr was checked again today was even worse (3 --> 3.8) so pt was transferred to ED.  Of note pt has a periprosthetic fracture near R TKR. She is in a knee brace but weight bearing as tolerated. She was seen by ortho in [place]. She is not a surgical candidate for revision of TKR due to other comorbidities. She occasionally has an abscess that erupts near the inferior end of the prosthesis and drains to air, then heals. This has happened several times in last 20 mos, she has seen orthopedists and ID physicians. Decision was made to treat conservatively. She has no symptoms at present in her knee. pertinent history continued...  Cr was monitored and noted to be elevated 2d ago, she received IVF and Cr was checked again today was even worse (3 --> 3.8) so pt was transferred to ED.  Of note pt has a periprosthetic fracture near R TKR. She is in a knee brace but weight bearing as tolerated. She was seen by ortho in [place]. She is not a surgical candidate for revision of TKR due to other comorbidities. She occasionally has an abscess that erupts near the inferior end of the prosthesis and drains to air, then heals. This has happened several times in last 20 mos, she has seen orthopedists and ID physicians. Decision was made to treat conservatively. She has no symptoms at present in her knee.   Pt admitted with acute on chronic renal failure.

## 2017-08-12 NOTE — DIETITIAN INITIAL EVALUATION ADULT. - ORAL INTAKE PTA
per family, pt is not usually a good eater,  gives her Nepro at home but she doesn't always take it, needs encouragement. Po intake 10-50% per flow sheet.

## 2017-08-12 NOTE — PHYSICAL THERAPY INITIAL EVALUATION ADULT - LIVES WITH, PROFILE
Pt lives with spouse in a private house. Pt has 4-6 stairs to enter with arielle railings, not within reach of each other, chair lift to upstairs bedroom. Pt lives with spouse, son/son's family e in a private house. Pt has 4-6 stairs to enter with arielle railings, not within reach of each other, chair lift to upstairs bedroom. Pt lives with spouse, son/son's family  in a private house. Pt has 4-6 stairs to enter with arielle railings, not within reach of each other, chair lift to upstairs bedroom.

## 2017-08-12 NOTE — PHYSICAL THERAPY INITIAL EVALUATION ADULT - GAIT DEVIATIONS NOTED, PT EVAL
decreased stride length/decreased step length/decreased margarita/increased time in double stance/decreased weight-shifting ability

## 2017-08-12 NOTE — PROGRESS NOTE ADULT - PROBLEM SELECTOR PLAN 1
Improving with IVF  As per family, somnolence is interfering with PO intake  In light of worsening metabolic acidosis noted on SMA, will restart IVF as 1/2NS with 75 meq bicarb at 50 ml/hr  Dose meds for eGFR ~15

## 2017-08-12 NOTE — DIETITIAN INITIAL EVALUATION ADULT. - FACTORS AFF FOOD INTAKE
difficulty chewing/decreased appetite. Family reports pt has some difficulty chewing as dentures are loose, discussed changing diet consistency but they would prefer pt to say on soft consistency  and choose foods from menu that pt will eat.

## 2017-08-12 NOTE — PHYSICAL THERAPY INITIAL EVALUATION ADULT - TRANSFER SAFETY CONCERNS NOTED: SIT/STAND, REHAB EVAL
decreased weight-shifting ability/decreased safety awareness/decreased sequencing ability/losing balance

## 2017-08-12 NOTE — PROGRESS NOTE ADULT - SUBJECTIVE AND OBJECTIVE BOX
[facility]   [phone]  Dr. Deluna (covering for Dr. Schilling)  RENAL FOLLOW UP NOTE  --------------------------------------------------------------------------------  Patient seen and examined.  Reports somnolence.  Denies pain/SOB    PAST HISTORY  --------------------------------------------------------------------------------  No significant changes to PMH, PSH, FHx, SHx, unless otherwise noted    ALLERGIES & MEDICATIONS  --------------------------------------------------------------------------------  Allergies    No Known Allergies    Intolerances      Standing Inpatient Medications  simvastatin 20 milliGRAM(s) Oral at bedtime  risperiDONE   Tablet 0.25 milliGRAM(s) Oral at bedtime  metoprolol 50 milliGRAM(s) Oral two times a day  ferrous    sulfate 325 milliGRAM(s) Oral daily  pantoprazole    Tablet 40 milliGRAM(s) Oral before breakfast  heparin  Injectable 5000 Unit(s) SubCutaneous every 12 hours    PRN Inpatient Medications  risperiDONE   Tablet 0.25 milliGRAM(s) Oral every 12 hours PRN      REVIEW OF SYSTEMS  --------------------------------------------------------------------------------  Gen: denies fatigue, fevers/chills, weakness.  Reports feeling very sleepy  CVS: denies chest pain/palpitations  Resp: denies SOB/Cough  GI: Denies N/V/Abd pain  : Denies dysuria/oliguria/hematuria    All other systems were reviewed and are negative, except as noted.    VITALS/PHYSICAL EXAM  --------------------------------------------------------------------------------  T(C): 36.9 (08-12-17 @ 12:08), Max: 37.1 (08-11-17 @ 19:33)  HR: 74 (08-12-17 @ 12:08) (72 - 94)  BP: 149/75 (08-12-17 @ 12:08) (123/75 - 149/75)  RR: 18 (08-12-17 @ 12:08) (18 - 18)  SpO2: 96% (08-12-17 @ 12:08) (93% - 98%)  Wt(kg): --  Height (cm): 149.86 (08-10-17 @ 18:00)  Weight (kg): 56.2 (08-10-17 @ 18:00)  BMI (kg/m2): 25 (08-10-17 @ 18:00)  BSA (m2): 1.5 (08-10-17 @ 18:00)      08-11-17 @ 07:01  -  08-12-17 @ 07:00  --------------------------------------------------------  IN: 2390 mL / OUT: 1400 mL / NET: 990 mL    08-12-17 @ 07:01  -  08-12-17 @ 16:53  --------------------------------------------------------  IN: 730 mL / OUT: 400 mL / NET: 330 mL      Physical Exam:  	Gen: NAD, well-appearing  	Pulm: CTA B/L  	CV: RRR, S1S2; no rub  	Abd: +BS, soft, nontender/nondistended  	: No suprapubic tenderness.  + roque              Extremity: No cyanosis, no edema. RLE brace  	Neuro: A&O to self, place          LABS/STUDIES  --------------------------------------------------------------------------------              8.0    7.50  >-----------<  357      [08-12-17 @ 08:39]              27.4     140  |  111  |  36  ----------------------------<  83      [08-12-17 @ 08:48]  5.2   |  16  |  2.70        Ca     9.2     [08-12-17 @ 08:48]      Mg     2.1     [08-12-17 @ 08:48]      Phos  4.1     [08-12-17 @ 08:48]    TPro  6.7  /  Alb  2.5  /  TBili  0.2  /  DBili  x   /  AST  17  /  ALT  15  /  AlkPhos  75  [08-12-17 @ 08:48]          eGFR if Non African American: 16 mL/min/1.73M2 (08-12 @ 08:48)  eGFR if : 18 mL/min/1.73M2 (08-12 @ 08:48)    Creatinine Trend:  SCr 2.70 [08-12 @ 08:48]  SCr 2.78 [08-11 @ 07:12]  SCr 3.41 [08-10 @ 15:28]              Urinalysis - [08-10-17 @ 17:33]      Color  / Appearance Clear / SG 1.011 / pH 6.0      Gluc Negative / Ketone Negative  / Bili Negative / Urobili Negative       Blood Trace / Protein Trace / Leuk Est Large / Nitrite Negative      RBC 2-5 / WBC >50 / Hyaline  / Gran  / Sq Epi  / Non Sq Epi  / Bacteria Few    Urine Creatinine 30      [08-11-17 @ 07:12]  Urine Sodium 78      [08-11-17 @ 07:12]    PTH -- (Ca 8.9)      [05-05-17 @ 07:21]   32  Vitamin D (25OH) 64.4      [05-05-17 @ 07:21]  TSH 5.58      [08-11-17 @ 07:12]

## 2017-08-12 NOTE — DIETITIAN INITIAL EVALUATION ADULT. - OTHER INFO
Pt seen for: consult for assessment.   Adm dx: NELL on CKD    GI issues: denies N/V/D  Last BM: yesterday    Food allergies: NKFA    Vit/supplement PTA:  Fe, vitamin D

## 2017-08-12 NOTE — PROGRESS NOTE ADULT - ASSESSMENT
81 y/o female with solitary kidney/CKD4, dementia, neurogenic bladder with chronic roque, chronic RK periprosthetic fracture with NELL and metabolic acidosis

## 2017-08-13 LAB
ALBUMIN SERPL ELPH-MCNC: 2.8 G/DL — LOW (ref 3.3–5)
ALP SERPL-CCNC: 69 U/L — SIGNIFICANT CHANGE UP (ref 40–120)
ALT FLD-CCNC: 13 U/L RC — SIGNIFICANT CHANGE UP (ref 10–45)
ANION GAP SERPL CALC-SCNC: 8 MMOL/L — SIGNIFICANT CHANGE UP (ref 5–17)
AST SERPL-CCNC: 16 U/L — SIGNIFICANT CHANGE UP (ref 10–40)
BILIRUB SERPL-MCNC: 0.2 MG/DL — SIGNIFICANT CHANGE UP (ref 0.2–1.2)
BUN SERPL-MCNC: 31 MG/DL — HIGH (ref 7–23)
CALCIUM SERPL-MCNC: 9.1 MG/DL — SIGNIFICANT CHANGE UP (ref 8.4–10.5)
CHLORIDE SERPL-SCNC: 110 MMOL/L — HIGH (ref 96–108)
CO2 SERPL-SCNC: 23 MMOL/L — SIGNIFICANT CHANGE UP (ref 22–31)
CREAT SERPL-MCNC: 2.5 MG/DL — HIGH (ref 0.5–1.3)
GLUCOSE SERPL-MCNC: 108 MG/DL — HIGH (ref 70–99)
HCT VFR BLD CALC: 27.6 % — LOW (ref 34.5–45)
HGB BLD-MCNC: 8.4 G/DL — LOW (ref 11.5–15.5)
MAGNESIUM SERPL-MCNC: 2 MG/DL — SIGNIFICANT CHANGE UP (ref 1.6–2.6)
MCHC RBC-ENTMCNC: 29.9 PG — SIGNIFICANT CHANGE UP (ref 27–34)
MCHC RBC-ENTMCNC: 30.6 GM/DL — LOW (ref 32–36)
MCV RBC AUTO: 97.8 FL — SIGNIFICANT CHANGE UP (ref 80–100)
PHOSPHATE SERPL-MCNC: 3.6 MG/DL — SIGNIFICANT CHANGE UP (ref 2.5–4.5)
PLATELET # BLD AUTO: 281 K/UL — SIGNIFICANT CHANGE UP (ref 150–400)
POTASSIUM SERPL-MCNC: 5.1 MMOL/L — SIGNIFICANT CHANGE UP (ref 3.5–5.3)
POTASSIUM SERPL-SCNC: 5.1 MMOL/L — SIGNIFICANT CHANGE UP (ref 3.5–5.3)
PROT SERPL-MCNC: 6.5 G/DL — SIGNIFICANT CHANGE UP (ref 6–8.3)
RBC # BLD: 2.83 M/UL — LOW (ref 3.8–5.2)
RBC # FLD: 15.8 % — HIGH (ref 10.3–14.5)
SODIUM SERPL-SCNC: 141 MMOL/L — SIGNIFICANT CHANGE UP (ref 135–145)
WBC # BLD: 7.5 K/UL — SIGNIFICANT CHANGE UP (ref 3.8–10.5)
WBC # FLD AUTO: 7.5 K/UL — SIGNIFICANT CHANGE UP (ref 3.8–10.5)

## 2017-08-13 PROCEDURE — 99233 SBSQ HOSP IP/OBS HIGH 50: CPT

## 2017-08-13 RX ORDER — SODIUM BICARBONATE 1 MEQ/ML
650 SYRINGE (ML) INTRAVENOUS THREE TIMES A DAY
Qty: 0 | Refills: 0 | Status: DISCONTINUED | OUTPATIENT
Start: 2017-08-13 | End: 2017-08-14

## 2017-08-13 RX ORDER — MIRTAZAPINE 45 MG/1
15 TABLET, ORALLY DISINTEGRATING ORAL AT BEDTIME
Qty: 0 | Refills: 0 | Status: DISCONTINUED | OUTPATIENT
Start: 2017-08-13 | End: 2017-08-14

## 2017-08-13 RX ADMIN — HEPARIN SODIUM 5000 UNIT(S): 5000 INJECTION INTRAVENOUS; SUBCUTANEOUS at 05:15

## 2017-08-13 RX ADMIN — MIRTAZAPINE 15 MILLIGRAM(S): 45 TABLET, ORALLY DISINTEGRATING ORAL at 21:49

## 2017-08-13 RX ADMIN — Medication 650 MILLIGRAM(S): at 15:02

## 2017-08-13 RX ADMIN — PANTOPRAZOLE SODIUM 40 MILLIGRAM(S): 20 TABLET, DELAYED RELEASE ORAL at 05:15

## 2017-08-13 RX ADMIN — Medication 650 MILLIGRAM(S): at 21:49

## 2017-08-13 RX ADMIN — Medication 50 MILLIGRAM(S): at 05:15

## 2017-08-13 RX ADMIN — Medication 325 MILLIGRAM(S): at 11:09

## 2017-08-13 RX ADMIN — Medication 50 MILLIGRAM(S): at 17:33

## 2017-08-13 RX ADMIN — HEPARIN SODIUM 5000 UNIT(S): 5000 INJECTION INTRAVENOUS; SUBCUTANEOUS at 17:33

## 2017-08-13 RX ADMIN — SIMVASTATIN 20 MILLIGRAM(S): 20 TABLET, FILM COATED ORAL at 21:49

## 2017-08-13 NOTE — PROGRESS NOTE ADULT - ASSESSMENT
83 y/o female with solitary kidney/CKD4, dementia, neurogenic bladder with chronic roque, chronic RK periprosthetic fracture with NELL and metabolic acidosis

## 2017-08-13 NOTE — PROGRESS NOTE ADULT - PROBLEM SELECTOR PLAN 1
-Likely pre-renal in setting of CKD IV  -Bicarb drip   -Monitor BMP  -Encourage oral hydration  -Nephro following and in agreement with management  -D/W nephrology  -Asymptomatic bacteruria likely due to chronic roque, monitor off of antibiotic

## 2017-08-13 NOTE — PROGRESS NOTE ADULT - SUBJECTIVE AND OBJECTIVE BOX
[facility]   [phone]  Dr. Deluna (covering for Dr. Schilling)  RENAL FOLLOW UP NOTE  --------------------------------------------------------------------------------  Patient seen and examined.  More alert today.  Debbie CP/ALEXSANDER    PAST HISTORY  --------------------------------------------------------------------------------  No significant changes to PMH, PSH, FHx, SHx, unless otherwise noted    ALLERGIES & MEDICATIONS  --------------------------------------------------------------------------------  Allergies    No Known Allergies    Intolerances      Standing Inpatient Medications  simvastatin 20 milliGRAM(s) Oral at bedtime  metoprolol 50 milliGRAM(s) Oral two times a day  ferrous    sulfate 325 milliGRAM(s) Oral daily  pantoprazole    Tablet 40 milliGRAM(s) Oral before breakfast  heparin  Injectable 5000 Unit(s) SubCutaneous every 12 hours  mirtazapine 15 milliGRAM(s) Oral at bedtime  sodium bicarbonate 650 milliGRAM(s) Oral three times a day    PRN Inpatient Medications  risperiDONE   Solution 0.125 milliGRAM(s) Oral at bedtime PRN      REVIEW OF SYSTEMS  --------------------------------------------------------------------------------  Gen: denies fatigue, fevers/chills, weakness  CVS: denies chest pain/palpitations  Resp: denies SOB/Cough  GI: Denies N/V/Abd pain  : Denies dysuria/oliguria/hematuria    All other systems were reviewed and are negative, except as noted.    VITALS/PHYSICAL EXAM  --------------------------------------------------------------------------------  T(C): 37.1 (08-13-17 @ 12:02), Max: 37.5 (08-12-17 @ 19:34)  HR: 70 (08-13-17 @ 12:02) (70 - 98)  BP: 100/64 (08-13-17 @ 12:02) (100/64 - 151/76)  RR: 18 (08-13-17 @ 12:02) (18 - 18)  SpO2: 95% (08-13-17 @ 12:02) (94% - 98%)  Wt(kg): --        08-12-17 @ 07:01  -  08-13-17 @ 07:00  --------------------------------------------------------  IN: 1700 mL / OUT: 1400 mL / NET: 300 mL    08-13-17 @ 07:01  -  08-13-17 @ 14:00  --------------------------------------------------------  IN: 280 mL / OUT: 0 mL / NET: 280 mL      Physical Exam:  	Gen: NAD, well-appearing  	Pulm: CTA B/L ant/lat fields  	CV: RRR, S1S2; no rub  	Abd: +BS, soft, nontender/nondistended  	: No suprapubic tenderness.  +roque              Extremity: No cyanosis, no edema  	Neuro: A&O to self, place, time of day  	Psych: Normal affect and mood        LABS/STUDIES  --------------------------------------------------------------------------------              8.4    7.5   >-----------<  281      [08-13-17 @ 11:24]              27.6     141  |  110  |  31  ----------------------------<  108      [08-13-17 @ 11:24]  5.1   |  23  |  2.50        Ca     9.1     [08-13-17 @ 11:24]      Mg     2.0     [08-13-17 @ 11:24]      Phos  3.6     [08-13-17 @ 11:24]    TPro  6.5  /  Alb  2.8  /  TBili  0.2  /  DBili  x   /  AST  16  /  ALT  13  /  AlkPhos  69  [08-13-17 @ 11:24]          eGFR if Non African American: 17 mL/min/1.73M2 (08-13 @ 11:24)  eGFR if African American: 20 mL/min/1.73M2 (08-13 @ 11:24)    Creatinine Trend:  SCr 2.50 [08-13 @ 11:24]  SCr 2.70 [08-12 @ 08:48]  SCr 2.78 [08-11 @ 07:12]  SCr 3.41 [08-10 @ 15:28]              Urinalysis - [08-10-17 @ 17:33]      Color  / Appearance Clear / SG 1.011 / pH 6.0      Gluc Negative / Ketone Negative  / Bili Negative / Urobili Negative       Blood Trace / Protein Trace / Leuk Est Large / Nitrite Negative      RBC 2-5 / WBC >50 / Hyaline  / Gran  / Sq Epi  / Non Sq Epi  / Bacteria Few    Urine Creatinine 30      [08-11-17 @ 07:12]  Urine Sodium 78      [08-11-17 @ 07:12]    PTH -- (Ca 8.9)      [05-05-17 @ 07:21]   32  Vitamin D (25OH) 64.4      [05-05-17 @ 07:21]  TSH 5.58      [08-11-17 @ 07:12]

## 2017-08-13 NOTE — PROGRESS NOTE ADULT - SUBJECTIVE AND OBJECTIVE BOX
RADHA CHAUDHRY  MRN-[id number]    Chief Complain:Patient is a 82y old  Female who presents with a chief complaint of NELL (10 Aug 2017 17:58)      SUBJECTIVE / OVERNIGHT EVENTS:  patient seen and examined at bedside. Patient was more sleepy yesterday during the day. She denies nausea, vomiting.    Review of Systems:  14 point ROS negative in detail except for the above:    MEDICATIONS  (STANDING):  simvastatin 20 milliGRAM(s) Oral at bedtime  metoprolol 50 milliGRAM(s) Oral two times a day  ferrous    sulfate 325 milliGRAM(s) Oral daily  pantoprazole    Tablet 40 milliGRAM(s) Oral before breakfast  heparin  Injectable 5000 Unit(s) SubCutaneous every 12 hours  sodium bicarbonate  Infusion 0.067 mEq/kG/Hr (50 mL/Hr) IV Continuous <Continuous>    MEDICATIONS  (PRN):  risperiDONE   Solution 0.125 milliGRAM(s) Oral at bedtime PRN agitation      T(C): 36.8 (08-13-17 @ 05:13), Max: 37.5 (08-12-17 @ 19:34)  HR: 80 (08-13-17 @ 05:13) (74 - 98)  BP: 133/67 (08-13-17 @ 05:13) (133/67 - 151/76)  RR: 18 (08-13-17 @ 05:13) (18 - 18)  SpO2: 94% (08-13-17 @ 05:13) (94% - 98%)    CAPILLARY BLOOD GLUCOSE          I&O's Summary    12 Aug 2017 07:01  -  13 Aug 2017 07:00  --------------------------------------------------------  IN: 1700 mL / OUT: 1400 mL / NET: 300 mL        Allergies    No Known Allergies    Intolerances        PHYSICAL EXAM:  GENERAL: Not in distress, well-developed  HEAD:  Atraumatic, Normocephalic  EYES: EOMI, PERRLA, conjunctiva and sclera clear  NECK: Supple, No JVD  CHEST/LUNG: Clear to auscultation bilaterally; No wheeze  HEART: Regular rate and rhythm; systolic murmurs  ABDOMEN: Soft, Nontender, Nondistended; Bowel sounds present  EXTREMITIES: No joint effusions, good muscle tone and bulk. Right knee brace in place  PSYCH: Normal mood and affect  NEUROLOGY: Grossly intact   SKIN: No rashes or lesions    LABS:                        8.0    7.50  )-----------( 357      ( 12 Aug 2017 08:39 )             27.4     08-12    140  |  111<H>  |  36<H>  ----------------------------<  83  5.2   |  16<L>  |  2.70<H>    Ca    9.2      12 Aug 2017 08:48  Phos  4.1     08-12  Mg     2.1     08-12    TPro  6.7  /  Alb  2.5<L>  /  TBili  0.2  /  DBili  x   /  AST  17  /  ALT  15  /  AlkPhos  75  08-12    LIVER FUNCTIONS - ( 12 Aug 2017 08:48 )  Alb: 2.5 g/dL / Pro: 6.7 g/dL / ALK PHOS: 75 U/L / ALT: 15 U/L / AST: 17 U/L / GGT: x             Consultant(s) Notes Reviewed:  Nephrology     Care Discussed with Consultants/Other Providers: Nephrology

## 2017-08-13 NOTE — PROGRESS NOTE ADULT - PROBLEM SELECTOR PLAN 1
with concurrent metabolic acidosis  Patient started on bicarb gtt yesterday.  Serum bicarb now WNL  Agree d/c bicarb gtt.  Renal function also improved, patient taking PO-- no further need for IVF  Dose meds for eGFR ~15-20  Agree with standing bicarb tabs in setting of CKD

## 2017-08-14 ENCOUNTER — TRANSCRIPTION ENCOUNTER (OUTPATIENT)
Age: 82
End: 2017-08-14

## 2017-08-14 VITALS
HEART RATE: 87 BPM | TEMPERATURE: 98 F | OXYGEN SATURATION: 97 % | DIASTOLIC BLOOD PRESSURE: 71 MMHG | RESPIRATION RATE: 18 BRPM | SYSTOLIC BLOOD PRESSURE: 154 MMHG

## 2017-08-14 LAB
ALBUMIN SERPL ELPH-MCNC: 2.6 G/DL — LOW (ref 3.3–5)
ALP SERPL-CCNC: 76 U/L — SIGNIFICANT CHANGE UP (ref 40–120)
ALT FLD-CCNC: 12 U/L — SIGNIFICANT CHANGE UP (ref 10–45)
ANION GAP SERPL CALC-SCNC: 13 MMOL/L — SIGNIFICANT CHANGE UP (ref 5–17)
AST SERPL-CCNC: 18 U/L — SIGNIFICANT CHANGE UP (ref 10–40)
BASOPHILS # BLD AUTO: 0.14 K/UL — SIGNIFICANT CHANGE UP (ref 0–0.2)
BASOPHILS NFR BLD AUTO: 2 % — SIGNIFICANT CHANGE UP (ref 0–2)
BILIRUB SERPL-MCNC: 0.2 MG/DL — SIGNIFICANT CHANGE UP (ref 0.2–1.2)
BUN SERPL-MCNC: 32 MG/DL — HIGH (ref 7–23)
CALCIUM SERPL-MCNC: 9.4 MG/DL — SIGNIFICANT CHANGE UP (ref 8.4–10.5)
CHLORIDE SERPL-SCNC: 110 MMOL/L — HIGH (ref 96–108)
CO2 SERPL-SCNC: 19 MMOL/L — LOW (ref 22–31)
CREAT SERPL-MCNC: 2.4 MG/DL — HIGH (ref 0.5–1.3)
EOSINOPHIL # BLD AUTO: 0.29 K/UL — SIGNIFICANT CHANGE UP (ref 0–0.5)
EOSINOPHIL NFR BLD AUTO: 4 % — SIGNIFICANT CHANGE UP (ref 0–6)
GLUCOSE SERPL-MCNC: 88 MG/DL — SIGNIFICANT CHANGE UP (ref 70–99)
HCT VFR BLD CALC: 27.1 % — LOW (ref 34.5–45)
HGB BLD-MCNC: 7.9 G/DL — LOW (ref 11.5–15.5)
LYMPHOCYTES # BLD AUTO: 2.24 K/UL — SIGNIFICANT CHANGE UP (ref 1–3.3)
LYMPHOCYTES # BLD AUTO: 31 % — SIGNIFICANT CHANGE UP (ref 13–44)
MAGNESIUM SERPL-MCNC: 1.9 MG/DL — SIGNIFICANT CHANGE UP (ref 1.6–2.6)
MCHC RBC-ENTMCNC: 28 PG — SIGNIFICANT CHANGE UP (ref 27–34)
MCHC RBC-ENTMCNC: 29.2 GM/DL — LOW (ref 32–36)
MCV RBC AUTO: 96.1 FL — SIGNIFICANT CHANGE UP (ref 80–100)
MONOCYTES # BLD AUTO: 0.72 K/UL — SIGNIFICANT CHANGE UP (ref 0–0.9)
MONOCYTES NFR BLD AUTO: 10 % — SIGNIFICANT CHANGE UP (ref 2–14)
NEUTROPHILS # BLD AUTO: 3.62 K/UL — SIGNIFICANT CHANGE UP (ref 1.8–7.4)
NEUTROPHILS NFR BLD AUTO: 50 % — SIGNIFICANT CHANGE UP (ref 43–77)
PHOSPHATE SERPL-MCNC: 3.8 MG/DL — SIGNIFICANT CHANGE UP (ref 2.5–4.5)
PLATELET # BLD AUTO: 295 K/UL — SIGNIFICANT CHANGE UP (ref 150–400)
POTASSIUM SERPL-MCNC: 5 MMOL/L — SIGNIFICANT CHANGE UP (ref 3.5–5.3)
POTASSIUM SERPL-SCNC: 5 MMOL/L — SIGNIFICANT CHANGE UP (ref 3.5–5.3)
PROT SERPL-MCNC: 6.5 G/DL — SIGNIFICANT CHANGE UP (ref 6–8.3)
RBC # BLD: 2.82 M/UL — LOW (ref 3.8–5.2)
RBC # FLD: 17.3 % — HIGH (ref 10.3–14.5)
SODIUM SERPL-SCNC: 142 MMOL/L — SIGNIFICANT CHANGE UP (ref 135–145)
WBC # BLD: 7.23 K/UL — SIGNIFICANT CHANGE UP (ref 3.8–10.5)
WBC # FLD AUTO: 7.23 K/UL — SIGNIFICANT CHANGE UP (ref 3.8–10.5)

## 2017-08-14 PROCEDURE — 99239 HOSP IP/OBS DSCHRG MGMT >30: CPT

## 2017-08-14 RX ORDER — SODIUM BICARBONATE 1 MEQ/ML
1 SYRINGE (ML) INTRAVENOUS
Qty: 0 | Refills: 0 | COMMUNITY
Start: 2017-08-14

## 2017-08-14 RX ORDER — RISPERIDONE 4 MG/1
1 TABLET ORAL
Qty: 0 | Refills: 0 | COMMUNITY

## 2017-08-14 RX ORDER — HEPARIN SODIUM 5000 [USP'U]/ML
5000 INJECTION INTRAVENOUS; SUBCUTANEOUS
Qty: 0 | Refills: 0 | COMMUNITY
Start: 2017-08-14

## 2017-08-14 RX ORDER — ERYTHROPOIETIN 10000 [IU]/ML
20000 INJECTION, SOLUTION INTRAVENOUS; SUBCUTANEOUS ONCE
Qty: 0 | Refills: 0 | Status: COMPLETED | OUTPATIENT
Start: 2017-08-14 | End: 2017-08-14

## 2017-08-14 RX ORDER — MIRTAZAPINE 45 MG/1
1 TABLET, ORALLY DISINTEGRATING ORAL
Qty: 30 | Refills: 0 | OUTPATIENT
Start: 2017-08-14 | End: 2017-09-13

## 2017-08-14 RX ORDER — ACETAMINOPHEN 500 MG
2 TABLET ORAL
Qty: 0 | Refills: 0 | COMMUNITY

## 2017-08-14 RX ORDER — MIRTAZAPINE 45 MG/1
1 TABLET, ORALLY DISINTEGRATING ORAL
Qty: 0 | Refills: 0 | COMMUNITY

## 2017-08-14 RX ADMIN — Medication 50 MILLIGRAM(S): at 17:13

## 2017-08-14 RX ADMIN — ERYTHROPOIETIN 20000 UNIT(S): 10000 INJECTION, SOLUTION INTRAVENOUS; SUBCUTANEOUS at 16:36

## 2017-08-14 RX ADMIN — Medication 325 MILLIGRAM(S): at 13:45

## 2017-08-14 RX ADMIN — PANTOPRAZOLE SODIUM 40 MILLIGRAM(S): 20 TABLET, DELAYED RELEASE ORAL at 04:56

## 2017-08-14 RX ADMIN — Medication 50 MILLIGRAM(S): at 04:55

## 2017-08-14 RX ADMIN — Medication 650 MILLIGRAM(S): at 13:45

## 2017-08-14 RX ADMIN — HEPARIN SODIUM 5000 UNIT(S): 5000 INJECTION INTRAVENOUS; SUBCUTANEOUS at 17:13

## 2017-08-14 RX ADMIN — HEPARIN SODIUM 5000 UNIT(S): 5000 INJECTION INTRAVENOUS; SUBCUTANEOUS at 05:03

## 2017-08-14 RX ADMIN — Medication 650 MILLIGRAM(S): at 04:55

## 2017-08-14 NOTE — DISCHARGE NOTE ADULT - PLAN OF CARE
NELL on CKD  avoid renal toxic medication  monitor BMP by your doctor improve NELL roque cath care continue your blood pressure medication no behaviour disturbance now  assist with activities at rehab

## 2017-08-14 NOTE — PROGRESS NOTE ADULT - PROBLEM SELECTOR PROBLEM 2
Chronic indwelling Chris catheter
Somnolence, daytime
Somnolence, daytime

## 2017-08-14 NOTE — PROGRESS NOTE ADULT - SUBJECTIVE AND OBJECTIVE BOX
[facility]   [phone]  RENAL FOLLOW UP NOTE  --------------------------------------------------------------------------------  Chief Complaint:    24 hour events/subjective:    states feels well and wants to go home    PAST HISTORY  --------------------------------------------------------------------------------  No significant changes to PMH, PSH, FHx, SHx, unless otherwise noted    ALLERGIES & MEDICATIONS  --------------------------------------------------------------------------------  Allergies    No Known Allergies    Intolerances      Standing Inpatient Medications  simvastatin 20 milliGRAM(s) Oral at bedtime  metoprolol 50 milliGRAM(s) Oral two times a day  ferrous    sulfate 325 milliGRAM(s) Oral daily  pantoprazole    Tablet 40 milliGRAM(s) Oral before breakfast  heparin  Injectable 5000 Unit(s) SubCutaneous every 12 hours  mirtazapine 15 milliGRAM(s) Oral at bedtime  sodium bicarbonate 650 milliGRAM(s) Oral three times a day  epoetin marco Injectable 73236 Unit(s) SubCutaneous once    PRN Inpatient Medications  risperiDONE   Solution 0.125 milliGRAM(s) Oral at bedtime PRN      REVIEW OF SYSTEMS  --------------------------------------------------------------------------------    Gen: denies fevers/chills,  CVS: denies chest pain/palpitations  Resp: denies SOB/Cough  GI: Denies N/V/Abd pain    All other systems were reviewed and are negative, except as noted.    VITALS/PHYSICAL EXAM  --------------------------------------------------------------------------------  T(C): 36.9 (08-14-17 @ 12:01), Max: 37.1 (08-13-17 @ 19:37)  HR: 87 (08-14-17 @ 12:01) (80 - 99)  BP: 153/77 (08-14-17 @ 12:01) (117/60 - 164/82)  RR: 18 (08-14-17 @ 12:01) (18 - 18)  SpO2: 95% (08-14-17 @ 12:01) (94% - 98%)  Wt(kg): --        08-13-17 @ 07:01  -  08-14-17 @ 07:00  --------------------------------------------------------  IN: 1670 mL / OUT: 1225 mL / NET: 445 mL      Physical Exam:  	    Physical Exam:  	Gen: dementia but commuicative   	Pulm: Decreased breath sounds b/l bases. no rales or ronchi or wheezing  	CV: RRR, S1/S2. no rub  	Back: No CVA tenderness; no sacral edema  	Abd: +BS, soft, nontender/nondistended  	: No suprapubic tenderness.               Extremity: No cyanosis, no edema no clubbing. RLE brace  	    LABS/STUDIES  --------------------------------------------------------------------------------              7.9    7.23  >-----------<  295      [08-14-17 @ 07:16]              27.1     142  |  110  |  32  ----------------------------<  88      [08-14-17 @ 07:14]  5.0   |  19  |  2.40        Ca     9.4     [08-14-17 @ 07:14]      Mg     1.9     [08-14-17 @ 07:14]      Phos  3.8     [08-14-17 @ 07:14]    TPro  6.5  /  Alb  2.6  /  TBili  0.2  /  DBili  x   /  AST  18  /  ALT  12  /  AlkPhos  76  [08-14-17 @ 07:14]          Creatinine Trend:  SCr 2.40 [08-14 @ 07:14]  SCr 2.50 [08-13 @ 11:24]  SCr 2.70 [08-12 @ 08:48]  SCr 2.78 [08-11 @ 07:12]  SCr 3.41 [08-10 @ 15:28]              Urinalysis - [08-10-17 @ 17:33]      Color  / Appearance Clear / SG 1.011 / pH 6.0      Gluc Negative / Ketone Negative  / Bili Negative / Urobili Negative       Blood Trace / Protein Trace / Leuk Est Large / Nitrite Negative      RBC 2-5 / WBC >50 / Hyaline  / Gran  / Sq Epi  / Non Sq Epi  / Bacteria Few    Urine Creatinine 30      [08-11-17 @ 07:12]  Urine Sodium 78      [08-11-17 @ 07:12]    PTH -- (Ca 8.9)      [05-05-17 @ 07:21]   32  Vitamin D (25OH) 64.4      [05-05-17 @ 07:21]  TSH 5.58      [08-11-17 @ 07:12]

## 2017-08-14 NOTE — DISCHARGE NOTE ADULT - HOSPITAL COURSE
to be completed by attending md 82F w/CKD 4 one kidney, dementia with behavioral disturbances, neurogenic bladder w/chronic roque, R knee periprosthetic fracture presents w/ acute on chronic renal failure likely prerenal azotemia/ATN, creatinine is now at baseline, patient with anemia likely due CKD - started on epogen. Patient fell at home was at rehab for R knee periprosthetic fracture and brought in to hospital - d/c plan to [facility]    Problem/Plan - 1:  ·  Problem: Acute on chronic renal failure.  Plan: -Likely pre-renal in setting of CKD IV  -Patient initially req Bicarb drip then switched to bicarb 650mg TID  -Encourage oral hydration  - Romel Schilling from renal consulted  -Asymptomatic bacteruria likely due to chronic roque, monitor off of antibiotic.     Problem/Plan - 2:  ·  Problem: Chronic indwelling Roque catheter.  Plan: c/w roque for neurogenic bladder.     Problem/Plan - 3:  ·  Problem: Dementia with behavioral disturbance, unspecified dementia type.  At [facility] on last admission had episode of agitation and started on risperdal - since rehab has been tapering.  During this admission did not receive any doses.  Will discontinue and monitor d/w PCP office  Decrease remeron to 15mg qhs for sleep.     Problem/Plan - 4:  ·  Problem: Physical deconditioning.  Plan: PT.     Problem/Plan - 5:  ·  Problem: Essential hypertension.  Plan: metoprolol.     Problem/Plan - 6:  Problem: Knee effusion, right. Plan: monitor knee, WBAT. Per discussion w/family knee is stable right now and further evaluation is not desired.

## 2017-08-14 NOTE — DISCHARGE NOTE ADULT - MEDICATION SUMMARY - MEDICATIONS TO TAKE
I will START or STAY ON the medications listed below when I get home from the hospital:    Tylenol 325 mg oral tablet  -- 2 tab(s) by mouth every 6 hours, As Needed  pain  -- Indication: For Pain    sodium bicarbonate 650 mg oral tablet  -- 1 tab(s) by mouth 3 times a day  -- Indication: For renal supplement    heparin  -- 5000 unit(s) subcutaneous 2 times a day  -- Indication: For DVT prophylaxis    mirtazapine 15 mg oral tablet  -- 1 tab(s) by mouth once a day (at bedtime)  -- Indication: For mood    simvastatin 20 mg oral tablet  -- 1 tab(s) by mouth once a day (at bedtime)  -- Indication: For Cholesterol    metoprolol tartrate 50 mg oral tablet  -- 1 tab(s) by mouth 2 times a day  -- Indication: For Essential hypertension    Epogen 10,000 units/mL injectable solution  -- 1 milliliter(s) injectable once a week (Saturdays)  -- Indication: For Anemia    ferrous sulfate 325 mg (65 mg elemental iron) oral tablet  -- 1 tab(s) by mouth once a day  -- Indication: For iron supplement    omeprazole 20 mg oral delayed release capsule  -- 1 cap(s) by mouth once a day (in the morning)  -- Indication: For for stomach    Vitamin D3 2000 intl units oral tablet  -- 1 tab(s) by mouth once a day  -- Indication: For Vitamin D supplement

## 2017-08-14 NOTE — DISCHARGE NOTE ADULT - CARE PROVIDER_API CALL
Romel Schilling (DO), Nephrology  [address] [place] [address]  Phone: [phone]  Fax: [phone]

## 2017-08-14 NOTE — PROGRESS NOTE ADULT - ASSESSMENT
83 y/o female with solitary kidney/CKD4, dementia, neurogenic bladder with chronic roque  1- octavia resolving to baseline cr   2- hyperkalemia hx  3- anemia  4- acidosis    creatinine baseline now.   cont roque  epogen 53867 units  low k diet  sodium bicarbonate 650 mg tid

## 2017-08-14 NOTE — PROVIDER CONTACT NOTE (OTHER) - BACKGROUND
pt acute renal, htn, htn, neurogenic bladder
pt admitted for acute renal failure. hx of dementia, ckd, htn, neurogenic bladder disorder.
ACU.RENAL FAILURE.

## 2017-08-14 NOTE — DISCHARGE NOTE ADULT - SECONDARY DIAGNOSIS.
Chronic indwelling Chris catheter Essential hypertension Dementia with behavioral disturbance, unspecified dementia type

## 2017-08-14 NOTE — PROGRESS NOTE ADULT - ASSESSMENT
82F w/CKD 4 one kidney, dementia with behavioral disturbances, neurogenic bladder w/chronic roque, R knee periprosthetic fracture presents w/ acute on chronic renal failure likely prerenal azotemia/ATN, creatinine is now at baseline, patient with anemia likely due CKD - started on epogen. Patient fell at home was at rehab for R knee periprosthetic fracture and brought in to hospital - d/c plan to [facility]

## 2017-08-14 NOTE — PROVIDER CONTACT NOTE (OTHER) - ASSESSMENT
pt has a chronic indwelling catheter, requires the indwelling catheter for urinary retention. Day RN spoke to day np about renewing order, sunrise had downtime.

## 2017-08-14 NOTE — PROGRESS NOTE ADULT - PROBLEM SELECTOR PLAN 1
-Likely pre-renal in setting of CKD IV  -Bicarb drip discontinued now on bicarb 650mg TID  -Monitor BMP - creat now at baseline  -Encourage oral hydration  -Nephro following and in agreement with management  -Asymptomatic bacteruria likely due to chronic roque, monitor off of antibiotic

## 2017-08-14 NOTE — PROGRESS NOTE ADULT - PROBLEM SELECTOR PLAN 2
c/w roque for neurogenic bladder
c/w rqoue for neurogenic bladder
improved with decreased risperidone dosing
consider holding risperidone (hepatically metabolized by renally cleared)

## 2017-08-14 NOTE — DISCHARGE NOTE ADULT - PATIENT PORTAL LINK FT
“You can access the [facility] Patient Portal, offered by [facility] [place], by registering with the following website: [email or web address]”

## 2017-08-14 NOTE — PROGRESS NOTE ADULT - PROBLEM SELECTOR PLAN 3
Decrease Risperdal to 0.12 mg QHS, only PRN - would likely d/c since patient has not required an doses  Monitor QTC  Decrease remeron to 15mg qhs for sleep

## 2017-08-14 NOTE — DISCHARGE NOTE ADULT - CARE PLAN
Principal Discharge DX:	NELL (acute kidney injury)  Goal:	improve NELL  Instructions for follow-up, activity and diet:	NELL on CKD  avoid renal toxic medication  monitor BMP by your doctor  Secondary Diagnosis:	Chronic indwelling Roque catheter  Instructions for follow-up, activity and diet:	roque cath care  Secondary Diagnosis:	Essential hypertension  Instructions for follow-up, activity and diet:	continue your blood pressure medication  Secondary Diagnosis:	Dementia with behavioral disturbance, unspecified dementia type  Instructions for follow-up, activity and diet:	no behaviour disturbance now  assist with activities at rehab

## 2017-08-14 NOTE — PROVIDER CONTACT NOTE (OTHER) - ASSESSMENT
pt b/p on electronic cuff 171/85. manual 157/85 on left. and 164/82 on right arm. pt denies chest pain. shows no s/s of distress, went back to sleep.

## 2017-08-14 NOTE — PROGRESS NOTE ADULT - ATTENDING COMMENTS
The above recommendations were discussed with the patient and her family. The patient and her family had all questions answered and expressed understanding of the plan.
d/c plan to [facility]
The above recommendations were discussed with the patient and her family. The patient and her family had all questions answered and expressed understanding of the plan.
I have performed a history and physical exam of this patient and discussed the management with the geriatric medicine fellow, Dr. Stevenson. I have reviewed the geriatric medicine fellow's note and agree with the documented findings and plan of care. The above recommendations were discussed with the patient and her family. The patient and her family had all questions answered and expressed understanding of the plan.

## 2017-08-14 NOTE — DISCHARGE NOTE ADULT - MEDICATION SUMMARY - MEDICATIONS TO STOP TAKING
I will STOP taking the medications listed below when I get home from the hospital:    pantoprazole 40 mg oral delayed release tablet  -- 1 tab(s) by mouth once a day (in the morning)    risperiDONE 0.25 mg oral tablet  -- 1 tab(s) by mouth 2 times a day

## 2017-08-14 NOTE — DISCHARGE NOTE ADULT - MEDICATION SUMMARY - MEDICATIONS TO CHANGE
I will SWITCH the dose or number of times a day I take the medications listed below when I get home from the hospital:    Tylenol 325 mg oral tablet  -- 2 tab(s) by mouth every 4 hours, As Needed    Remeron 30 mg oral tablet  -- 1 tab(s) by mouth once a day (at bedtime)

## 2017-08-17 ENCOUNTER — APPOINTMENT (OUTPATIENT)
Dept: SPEECH THERAPY | Facility: CLINIC | Age: 82
End: 2017-08-17

## 2017-09-06 RX ORDER — METOPROLOL TARTRATE 50 MG
1 TABLET ORAL
Qty: 0 | Refills: 0 | COMMUNITY

## 2017-09-06 RX ORDER — SIMVASTATIN 20 MG/1
1 TABLET, FILM COATED ORAL
Qty: 0 | Refills: 0 | COMMUNITY

## 2017-09-11 ENCOUNTER — APPOINTMENT (OUTPATIENT)
Dept: UROLOGY | Facility: CLINIC | Age: 82
End: 2017-09-11
Payer: MEDICARE

## 2017-09-11 VITALS
TEMPERATURE: 98.1 F | DIASTOLIC BLOOD PRESSURE: 75 MMHG | SYSTOLIC BLOOD PRESSURE: 145 MMHG | RESPIRATION RATE: 15 BRPM | HEART RATE: 84 BPM

## 2017-09-11 DIAGNOSIS — N31.9 NEUROMUSCULAR DYSFUNCTION OF BLADDER, UNSPECIFIED: ICD-10-CM

## 2017-09-11 PROCEDURE — 99213 OFFICE O/P EST LOW 20 MIN: CPT

## 2017-09-13 ENCOUNTER — APPOINTMENT (OUTPATIENT)
Dept: GERIATRICS | Facility: CLINIC | Age: 82
End: 2017-09-13
Payer: MEDICARE

## 2017-09-13 VITALS
DIASTOLIC BLOOD PRESSURE: 78 MMHG | BODY MASS INDEX: 27.61 KG/M2 | OXYGEN SATURATION: 96 % | WEIGHT: 128 LBS | HEIGHT: 57 IN | HEART RATE: 75 BPM | RESPIRATION RATE: 15 BRPM | SYSTOLIC BLOOD PRESSURE: 138 MMHG | TEMPERATURE: 98.1 F

## 2017-09-13 DIAGNOSIS — R63.0 ANOREXIA: ICD-10-CM

## 2017-09-13 PROCEDURE — 99214 OFFICE O/P EST MOD 30 MIN: CPT

## 2017-09-13 RX ORDER — MIRTAZAPINE 30 MG/1
30 TABLET, FILM COATED ORAL
Qty: 90 | Refills: 0 | Status: DISCONTINUED | COMMUNITY
Start: 2017-06-20 | End: 2017-09-13

## 2017-09-18 ENCOUNTER — TRANSCRIPTION ENCOUNTER (OUTPATIENT)
Age: 82
End: 2017-09-18

## 2017-09-21 ENCOUNTER — APPOINTMENT (OUTPATIENT)
Dept: GERIATRICS | Facility: CLINIC | Age: 82
End: 2017-09-21
Payer: MEDICARE

## 2017-09-21 ENCOUNTER — INPATIENT (INPATIENT)
Facility: HOSPITAL | Age: 82
LOS: 4 days | Discharge: HOME | DRG: 872 | End: 2017-09-26
Attending: INTERNAL MEDICINE | Admitting: INTERNAL MEDICINE
Payer: MEDICARE

## 2017-09-21 VITALS
BODY MASS INDEX: 27.79 KG/M2 | HEART RATE: 85 BPM | WEIGHT: 128.8 LBS | OXYGEN SATURATION: 94 % | HEIGHT: 57 IN | SYSTOLIC BLOOD PRESSURE: 140 MMHG | DIASTOLIC BLOOD PRESSURE: 82 MMHG

## 2017-09-21 VITALS
DIASTOLIC BLOOD PRESSURE: 100 MMHG | SYSTOLIC BLOOD PRESSURE: 196 MMHG | WEIGHT: 128.09 LBS | HEART RATE: 86 BPM | RESPIRATION RATE: 20 BRPM | TEMPERATURE: 101 F

## 2017-09-21 VITALS — TEMPERATURE: 99.1 F

## 2017-09-21 DIAGNOSIS — Z98.890 OTHER SPECIFIED POSTPROCEDURAL STATES: Chronic | ICD-10-CM

## 2017-09-21 DIAGNOSIS — J11.1 INFLUENZA DUE TO UNIDENTIFIED INFLUENZA VIRUS WITH OTHER RESPIRATORY MANIFESTATIONS: ICD-10-CM

## 2017-09-21 DIAGNOSIS — Z92.89 PERSONAL HISTORY OF OTHER MEDICAL TREATMENT: ICD-10-CM

## 2017-09-21 DIAGNOSIS — N18.4 CHRONIC KIDNEY DISEASE, STAGE 4 (SEVERE): ICD-10-CM

## 2017-09-21 DIAGNOSIS — J18.1 LOBAR PNEUMONIA, UNSPECIFIED ORGANISM: ICD-10-CM

## 2017-09-21 DIAGNOSIS — Z29.9 ENCOUNTER FOR PROPHYLACTIC MEASURES, UNSPECIFIED: ICD-10-CM

## 2017-09-21 DIAGNOSIS — I10 ESSENTIAL (PRIMARY) HYPERTENSION: ICD-10-CM

## 2017-09-21 DIAGNOSIS — E78.5 HYPERLIPIDEMIA, UNSPECIFIED: ICD-10-CM

## 2017-09-21 DIAGNOSIS — E87.5 HYPERKALEMIA: ICD-10-CM

## 2017-09-21 DIAGNOSIS — Z96.653 PRESENCE OF ARTIFICIAL KNEE JOINT, BILATERAL: Chronic | ICD-10-CM

## 2017-09-21 DIAGNOSIS — F03.91 UNSPECIFIED DEMENTIA WITH BEHAVIORAL DISTURBANCE: ICD-10-CM

## 2017-09-21 DIAGNOSIS — Z98.1 ARTHRODESIS STATUS: Chronic | ICD-10-CM

## 2017-09-21 LAB
ALBUMIN SERPL ELPH-MCNC: 3.8 G/DL — SIGNIFICANT CHANGE UP (ref 3.3–5)
ALP SERPL-CCNC: 83 U/L — SIGNIFICANT CHANGE UP (ref 40–120)
ALT FLD-CCNC: 12 U/L RC — SIGNIFICANT CHANGE UP (ref 10–45)
ANION GAP SERPL CALC-SCNC: 15 MMOL/L — SIGNIFICANT CHANGE UP (ref 5–17)
ANION GAP SERPL CALC-SCNC: 18 MMOL/L — HIGH (ref 5–17)
APPEARANCE UR: ABNORMAL
AST SERPL-CCNC: 22 U/L — SIGNIFICANT CHANGE UP (ref 10–40)
BACTERIA # UR AUTO: ABNORMAL /HPF
BASE EXCESS BLDV CALC-SCNC: -2.5 MMOL/L — LOW (ref -2–2)
BASE EXCESS BLDV CALC-SCNC: 0.7 MMOL/L — SIGNIFICANT CHANGE UP (ref -2–2)
BASOPHILS # BLD AUTO: 0 K/UL — SIGNIFICANT CHANGE UP (ref 0–0.2)
BASOPHILS NFR BLD AUTO: 0.3 % — SIGNIFICANT CHANGE UP (ref 0–2)
BILIRUB SERPL-MCNC: 0.3 MG/DL — SIGNIFICANT CHANGE UP (ref 0.2–1.2)
BILIRUB UR-MCNC: NEGATIVE — SIGNIFICANT CHANGE UP
BUN SERPL-MCNC: 36 MG/DL — HIGH (ref 7–23)
BUN SERPL-MCNC: 40 MG/DL — HIGH (ref 7–23)
CA-I SERPL-SCNC: 1.32 MMOL/L — HIGH (ref 1.12–1.3)
CA-I SERPL-SCNC: 1.4 MMOL/L — HIGH (ref 1.12–1.3)
CALCIUM SERPL-MCNC: 10 MG/DL — SIGNIFICANT CHANGE UP (ref 8.4–10.5)
CALCIUM SERPL-MCNC: 10.8 MG/DL — HIGH (ref 8.4–10.5)
CHLORIDE BLDV-SCNC: 101 MMOL/L — SIGNIFICANT CHANGE UP (ref 96–108)
CHLORIDE BLDV-SCNC: 104 MMOL/L — SIGNIFICANT CHANGE UP (ref 96–108)
CHLORIDE SERPL-SCNC: 100 MMOL/L — SIGNIFICANT CHANGE UP (ref 96–108)
CHLORIDE SERPL-SCNC: 97 MMOL/L — SIGNIFICANT CHANGE UP (ref 96–108)
CO2 BLDV-SCNC: 24 MMOL/L — SIGNIFICANT CHANGE UP (ref 22–30)
CO2 BLDV-SCNC: 30 MMOL/L — SIGNIFICANT CHANGE UP (ref 22–30)
CO2 SERPL-SCNC: 19 MMOL/L — LOW (ref 22–31)
CO2 SERPL-SCNC: 24 MMOL/L — SIGNIFICANT CHANGE UP (ref 22–31)
COLOR SPEC: SIGNIFICANT CHANGE UP
CREAT SERPL-MCNC: 3.12 MG/DL — HIGH (ref 0.5–1.3)
CREAT SERPL-MCNC: 3.15 MG/DL — HIGH (ref 0.5–1.3)
DIFF PNL FLD: ABNORMAL
EOSINOPHIL # BLD AUTO: 0.2 K/UL — SIGNIFICANT CHANGE UP (ref 0–0.5)
EOSINOPHIL NFR BLD AUTO: 3.3 % — SIGNIFICANT CHANGE UP (ref 0–6)
EPI CELLS # UR: SIGNIFICANT CHANGE UP /HPF
FLUAV H1 2009 PAND RNA SPEC QL NAA+PROBE: DETECTED
GAS PNL BLDV: 135 MMOL/L — LOW (ref 136–145)
GAS PNL BLDV: 138 MMOL/L — SIGNIFICANT CHANGE UP (ref 136–145)
GAS PNL BLDV: SIGNIFICANT CHANGE UP
GLUCOSE BLDV-MCNC: 122 MG/DL — HIGH (ref 70–99)
GLUCOSE BLDV-MCNC: 84 MG/DL — SIGNIFICANT CHANGE UP (ref 70–99)
GLUCOSE SERPL-MCNC: 119 MG/DL — HIGH (ref 70–99)
GLUCOSE SERPL-MCNC: 80 MG/DL — SIGNIFICANT CHANGE UP (ref 70–99)
GLUCOSE UR QL: NEGATIVE — SIGNIFICANT CHANGE UP
HCO3 BLDV-SCNC: 22 MMOL/L — SIGNIFICANT CHANGE UP (ref 21–29)
HCO3 BLDV-SCNC: 28 MMOL/L — SIGNIFICANT CHANGE UP (ref 21–29)
HCT VFR BLD CALC: 38.1 % — SIGNIFICANT CHANGE UP (ref 34.5–45)
HCT VFR BLDA CALC: 33 % — LOW (ref 39–50)
HCT VFR BLDA CALC: 44 % — SIGNIFICANT CHANGE UP (ref 39–50)
HGB BLD CALC-MCNC: 10.8 G/DL — LOW (ref 11.5–15.5)
HGB BLD CALC-MCNC: 14.2 G/DL — SIGNIFICANT CHANGE UP (ref 11.5–15.5)
HGB BLD-MCNC: 11.9 G/DL — SIGNIFICANT CHANGE UP (ref 11.5–15.5)
KETONES UR-MCNC: NEGATIVE — SIGNIFICANT CHANGE UP
LACTATE BLDV-MCNC: 2.1 MMOL/L — HIGH (ref 0.7–2)
LACTATE BLDV-MCNC: 2.1 MMOL/L — HIGH (ref 0.7–2)
LEUKOCYTE ESTERASE UR-ACNC: ABNORMAL
LYMPHOCYTES # BLD AUTO: 1.3 K/UL — SIGNIFICANT CHANGE UP (ref 1–3.3)
LYMPHOCYTES # BLD AUTO: 18.1 % — SIGNIFICANT CHANGE UP (ref 13–44)
MCHC RBC-ENTMCNC: 30 PG — SIGNIFICANT CHANGE UP (ref 27–34)
MCHC RBC-ENTMCNC: 31.3 GM/DL — LOW (ref 32–36)
MCV RBC AUTO: 96.1 FL — SIGNIFICANT CHANGE UP (ref 80–100)
MONOCYTES # BLD AUTO: 1.2 K/UL — HIGH (ref 0–0.9)
MONOCYTES NFR BLD AUTO: 15.9 % — HIGH (ref 2–14)
NEUTROPHILS # BLD AUTO: 4.5 K/UL — SIGNIFICANT CHANGE UP (ref 1.8–7.4)
NEUTROPHILS NFR BLD AUTO: 62.4 % — SIGNIFICANT CHANGE UP (ref 43–77)
NITRITE UR-MCNC: NEGATIVE — SIGNIFICANT CHANGE UP
PCO2 BLDV: 41 MMHG — SIGNIFICANT CHANGE UP (ref 35–50)
PCO2 BLDV: 58 MMHG — HIGH (ref 35–50)
PH BLDV: 7.3 — LOW (ref 7.35–7.45)
PH BLDV: 7.36 — SIGNIFICANT CHANGE UP (ref 7.35–7.45)
PH UR: 7 — SIGNIFICANT CHANGE UP (ref 5–8)
PLATELET # BLD AUTO: 211 K/UL — SIGNIFICANT CHANGE UP (ref 150–400)
PO2 BLDV: 109 MMHG — HIGH (ref 25–45)
PO2 BLDV: 26 MMHG — SIGNIFICANT CHANGE UP (ref 25–45)
POTASSIUM BLDV-SCNC: 4.5 MMOL/L — SIGNIFICANT CHANGE UP (ref 3.5–5)
POTASSIUM BLDV-SCNC: 5.1 MMOL/L — HIGH (ref 3.5–5)
POTASSIUM SERPL-MCNC: 4.6 MMOL/L — SIGNIFICANT CHANGE UP (ref 3.5–5.3)
POTASSIUM SERPL-MCNC: 5.5 MMOL/L — HIGH (ref 3.5–5.3)
POTASSIUM SERPL-SCNC: 4.6 MMOL/L — SIGNIFICANT CHANGE UP (ref 3.5–5.3)
POTASSIUM SERPL-SCNC: 5.5 MMOL/L — HIGH (ref 3.5–5.3)
PROT SERPL-MCNC: 8.4 G/DL — HIGH (ref 6–8.3)
PROT UR-MCNC: 30 MG/DL
RAPID RVP RESULT: DETECTED
RBC # BLD: 3.96 M/UL — SIGNIFICANT CHANGE UP (ref 3.8–5.2)
RBC # FLD: 14.9 % — HIGH (ref 10.3–14.5)
RBC CASTS # UR COMP ASSIST: ABNORMAL /HPF (ref 0–2)
SAO2 % BLDV: 36 % — LOW (ref 67–88)
SAO2 % BLDV: 99 % — HIGH (ref 67–88)
SODIUM SERPL-SCNC: 134 MMOL/L — LOW (ref 135–145)
SODIUM SERPL-SCNC: 139 MMOL/L — SIGNIFICANT CHANGE UP (ref 135–145)
SP GR SPEC: 1.01 — LOW (ref 1.01–1.02)
UROBILINOGEN FLD QL: NEGATIVE — SIGNIFICANT CHANGE UP
WBC # BLD: 7.2 K/UL — SIGNIFICANT CHANGE UP (ref 3.8–10.5)
WBC # FLD AUTO: 7.2 K/UL — SIGNIFICANT CHANGE UP (ref 3.8–10.5)
WBC UR QL: >50 /HPF (ref 0–5)

## 2017-09-21 PROCEDURE — 99215 OFFICE O/P EST HI 40 MIN: CPT

## 2017-09-21 PROCEDURE — 71010: CPT | Mod: 26

## 2017-09-21 PROCEDURE — 99285 EMERGENCY DEPT VISIT HI MDM: CPT | Mod: 25,GC

## 2017-09-21 PROCEDURE — 99223 1ST HOSP IP/OBS HIGH 75: CPT

## 2017-09-21 PROCEDURE — 93010 ELECTROCARDIOGRAM REPORT: CPT

## 2017-09-21 RX ORDER — CHOLECALCIFEROL (VITAMIN D3) 125 MCG
1 CAPSULE ORAL
Qty: 0 | Refills: 0 | COMMUNITY

## 2017-09-21 RX ORDER — ACETAMINOPHEN 500 MG
1000 TABLET ORAL ONCE
Qty: 0 | Refills: 0 | Status: COMPLETED | OUTPATIENT
Start: 2017-09-21 | End: 2017-09-21

## 2017-09-21 RX ORDER — ERYTHROPOIETIN 10000 [IU]/ML
1 INJECTION, SOLUTION INTRAVENOUS; SUBCUTANEOUS
Qty: 0 | Refills: 0 | COMMUNITY

## 2017-09-21 RX ORDER — FERROUS SULFATE 325(65) MG
1 TABLET ORAL
Qty: 0 | Refills: 0 | COMMUNITY

## 2017-09-21 RX ORDER — IPRATROPIUM/ALBUTEROL SULFATE 18-103MCG
3 AEROSOL WITH ADAPTER (GRAM) INHALATION ONCE
Qty: 0 | Refills: 0 | Status: COMPLETED | OUTPATIENT
Start: 2017-09-21 | End: 2017-09-21

## 2017-09-21 RX ORDER — SIMVASTATIN 20 MG/1
1 TABLET, FILM COATED ORAL
Qty: 0 | Refills: 0 | COMMUNITY

## 2017-09-21 RX ORDER — OMEPRAZOLE 10 MG/1
1 CAPSULE, DELAYED RELEASE ORAL
Qty: 0 | Refills: 0 | COMMUNITY

## 2017-09-21 RX ORDER — FERROUS SULFATE 325(65) MG
325 TABLET ORAL
Qty: 0 | Refills: 0 | Status: DISCONTINUED | OUTPATIENT
Start: 2017-09-21 | End: 2017-09-26

## 2017-09-21 RX ORDER — ACETAMINOPHEN 500 MG
2 TABLET ORAL
Qty: 0 | Refills: 0 | COMMUNITY

## 2017-09-21 RX ORDER — SIMVASTATIN 20 MG/1
20 TABLET, FILM COATED ORAL AT BEDTIME
Qty: 0 | Refills: 0 | Status: DISCONTINUED | OUTPATIENT
Start: 2017-09-21 | End: 2017-09-26

## 2017-09-21 RX ORDER — MIRTAZAPINE 45 MG/1
1 TABLET, ORALLY DISINTEGRATING ORAL
Qty: 0 | Refills: 0 | COMMUNITY

## 2017-09-21 RX ORDER — ACETAMINOPHEN 500 MG
650 TABLET ORAL EVERY 6 HOURS
Qty: 0 | Refills: 0 | Status: DISCONTINUED | OUTPATIENT
Start: 2017-09-21 | End: 2017-09-26

## 2017-09-21 RX ORDER — METOPROLOL TARTRATE 50 MG
50 TABLET ORAL
Qty: 0 | Refills: 0 | Status: DISCONTINUED | OUTPATIENT
Start: 2017-09-21 | End: 2017-09-26

## 2017-09-21 RX ORDER — SODIUM BICARBONATE 1 MEQ/ML
650 SYRINGE (ML) INTRAVENOUS THREE TIMES A DAY
Qty: 0 | Refills: 0 | Status: DISCONTINUED | OUTPATIENT
Start: 2017-09-21 | End: 2017-09-26

## 2017-09-21 RX ORDER — FERROUS SULFATE 325(65) MG
0 TABLET ORAL
Qty: 0 | Refills: 0 | COMMUNITY

## 2017-09-21 RX ORDER — IPRATROPIUM/ALBUTEROL SULFATE 18-103MCG
3 AEROSOL WITH ADAPTER (GRAM) INHALATION EVERY 6 HOURS
Qty: 0 | Refills: 0 | Status: DISCONTINUED | OUTPATIENT
Start: 2017-09-21 | End: 2017-09-26

## 2017-09-21 RX ORDER — HEPARIN SODIUM 5000 [USP'U]/ML
5000 INJECTION INTRAVENOUS; SUBCUTANEOUS EVERY 12 HOURS
Qty: 0 | Refills: 0 | Status: DISCONTINUED | OUTPATIENT
Start: 2017-09-21 | End: 2017-09-26

## 2017-09-21 RX ORDER — METOPROLOL TARTRATE 50 MG
1 TABLET ORAL
Qty: 0 | Refills: 0 | COMMUNITY

## 2017-09-21 RX ORDER — SODIUM CHLORIDE 9 MG/ML
1000 INJECTION INTRAMUSCULAR; INTRAVENOUS; SUBCUTANEOUS
Qty: 0 | Refills: 0 | Status: DISCONTINUED | OUTPATIENT
Start: 2017-09-21 | End: 2017-09-22

## 2017-09-21 RX ORDER — PANTOPRAZOLE SODIUM 20 MG/1
40 TABLET, DELAYED RELEASE ORAL
Qty: 0 | Refills: 0 | Status: DISCONTINUED | OUTPATIENT
Start: 2017-09-21 | End: 2017-09-26

## 2017-09-21 RX ORDER — SODIUM CHLORIDE 9 MG/ML
1000 INJECTION INTRAMUSCULAR; INTRAVENOUS; SUBCUTANEOUS ONCE
Qty: 0 | Refills: 0 | Status: COMPLETED | OUTPATIENT
Start: 2017-09-21 | End: 2017-09-21

## 2017-09-21 RX ADMIN — SODIUM CHLORIDE 2000 MILLILITER(S): 9 INJECTION INTRAMUSCULAR; INTRAVENOUS; SUBCUTANEOUS at 18:39

## 2017-09-21 RX ADMIN — Medication 3 MILLILITER(S): at 20:51

## 2017-09-21 RX ADMIN — Medication 400 MILLIGRAM(S): at 18:39

## 2017-09-21 RX ADMIN — Medication 3 MILLILITER(S): at 20:50

## 2017-09-21 RX ADMIN — Medication 30 MILLIGRAM(S): at 20:53

## 2017-09-21 NOTE — ED PROVIDER NOTE - ATTENDING CONTRIBUTION TO CARE
Patient with chief complaint of cough. 1-2 days persistent. Moderate. Sent in by primary medical doctor secondary to uri symptoms and comorbidities.   mild wheezing bilaterally, mild distress, warm to touch, ncat, trachea midline  will get iv, labs, duo nebs, rvp, vbg, ekg, blood cultures, ua, urine culture and reassess.  Will follow up on labs, analgesia, reassess and disposition to the inpatient team as clinically indicated.

## 2017-09-21 NOTE — H&P ADULT - NSHPLABSRESULTS_GEN_ALL_CORE
Labs personally reviewed:                          11.9   7.2   )-----------( 211      ( 21 Sep 2017 18:43 )             38.1     09-21    134<L>  |  100  |  36<H>  ----------------------------<  119<H>  4.6   |  19<L>  |  3.15<H>    Ca    10.0      21 Sep 2017 21:52    TPro  8.4<H>  /  Alb  3.8  /  TBili  0.3  /  DBili  x   /  AST  22  /  ALT  12  /  AlkPhos  83  09-21        LIVER FUNCTIONS - ( 21 Sep 2017 18:43 )  Alb: 3.8 g/dL / Pro: 8.4 g/dL / ALK PHOS: 83 U/L / ALT: 12 U/L RC / AST: 22 U/L / GGT: x               CAPILLARY BLOOD GLUCOSE          Imaging:  CXR personally reviewed: no focal opacity    EKG personally reviewed: nsr, no acute st changes    Old records reviewed:  US kidneys Aug 2017: atrophic kidneys; medical renal disease  CT abdomen May 2017: No hydronephrosis, no nephrolithiasis Labs personally reviewed:                          11.9   7.2   )-----------( 211      ( 21 Sep 2017 18:43 )             38.1     09-21    134<L>  |  100  |  36<H>  ----------------------------<  119<H>  4.6   |  19<L>  |  3.15<H>    Ca    10.0      21 Sep 2017 21:52    TPro  8.4<H>  /  Alb  3.8  /  TBili  0.3  /  DBili  x   /  AST  22  /  ALT  12  /  AlkPhos  83  09-21        LIVER FUNCTIONS - ( 21 Sep 2017 18:43 )  Alb: 3.8 g/dL / Pro: 8.4 g/dL / ALK PHOS: 83 U/L / ALT: 12 U/L RC / AST: 22 U/L / GGT: x               CAPILLARY BLOOD GLUCOSE          Imaging:  CXR personally reviewed: no focal opacity    EKG personally reviewed: nsr, no acute st changes    Old records reviewed:  US kidneys Aug 2017: atrophic kidneys; medical renal disease  CT abdomen May 2017: No hydronephrosis, no nephrolithiasis  Echo July 2017: Normal LV systolic function, EF 65%; diastolic dysfunction

## 2017-09-21 NOTE — H&P ADULT - NSHPPHYSICALEXAM_GEN_ALL_CORE
PHYSICAL EXAM:  Vital Signs Last 24 Hrs  T(C): 38.1 (09-21-17 @ 20:19)  T(F): 100.5 (09-21-17 @ 20:19), Max: 100.6 (09-21-17 @ 16:05)  HR: 93 (09-21-17 @ 20:19) (80 - 93)  BP: 151/77 (09-21-17 @ 20:19)  BP(mean): --  RR: 18 (09-21-17 @ 20:19) (16 - 20)  SpO2: 96% (09-21-17 @ 20:19) (96% - 97%)  Wt(kg): --    Constitutional: NAD, awake and alert  EYES: EOMI  ENT:  Normal Hearing, no tonsillar exudates   Neck: Soft and supple, No JVD  Respiratory: mild b/l scattered wheezing  Cardiovascular: S1 and S2, regular rate and rhythm, no Murmurs, gallops or rubs  Gastrointestinal: Bowel Sounds present, soft, nontender, nondistended, no guarding, no rebound  Extremities: right knee swelling (chronic as per patient)  Vascular: 2+ peripheral pulses lower ex  Neurological: A/O x 3, no focal deficits  Musculoskeletal: 5/5 strength b/l upper and lower extremities  Skin: No rashes  Psych: no depression or anhedonia  HEME: no bruises, no nose bleeds

## 2017-09-21 NOTE — ED ADULT NURSE NOTE - PSH
History of bladder surgery    History of lumbar fusion    No significant past surgical history    Status post total bilateral knee replacement

## 2017-09-21 NOTE — H&P ADULT - NSHPATTENDINGPLANDISCUSS_GEN_ALL_CORE
Patient and her , Salbador Gama; Signed out to NP, ; Patient and her , Slabador Gama; Signed out to NPRenée;

## 2017-09-21 NOTE — ED PROVIDER NOTE - PMH
CKD (chronic kidney disease)    Dementia    Dementia with behavioral disturbance, unspecified dementia type    HTN (hypertension)    Knee effusion, right  chronic knee infection  Neurogenic bladder disorder    Ventral hernia without obstruction or gangrene CKD (chronic kidney disease)    Dementia    Dementia with behavioral disturbance, unspecified dementia type    HTN (hypertension)    Hyperlipidemia    Knee effusion, right  chronic knee infection  Neurogenic bladder disorder    Ventral hernia without obstruction or gangrene

## 2017-09-21 NOTE — H&P ADULT - NSHPREVIEWOFSYSTEMS_GEN_ALL_CORE
CONSTITUTIONAL: +weakness, +fever  EYES/ENT: No visual changes;  No dysphagia  NECK: No pain or stiffness  RESPIRATORY: +cough  CARDIOVASCULAR: No chest pain   EXTREMITIES: no le edema  MUSCULOSKELETAL: no joint pain, swelling  GASTROINTESTINAL: No abdominal pain. no vomiting  BACK: + back pain  GENITOURINARY: +indwelling catheter  NEUROLOGICAL: No numbness or weakness  SKIN: no rash  PSYCH: dementia  All other review of systems is negative unless indicated above.    Most of the ROS obtained from history obtained from patient and her family.

## 2017-09-21 NOTE — ED ADULT NURSE REASSESSMENT NOTE - NS ED NURSE REASSESS COMMENT FT1
indwelling roque catheter placed as per md order using sterile technique. pt tolerated well. initially drained 50cc of clear, yellow urine. sterile technique maintained. safety maintained. pt repositioned in bed, given warm blankets.

## 2017-09-21 NOTE — H&P ADULT - PROBLEM SELECTOR PLAN 2
c/w IVF  last creatinine in PMD office 3.1  c/w sodium bicarb c/w IVF  last creatinine in PMD office 3.1 in June  c/w sodium bicarb

## 2017-09-21 NOTE — ED PROVIDER NOTE - MEDICAL DECISION MAKING DETAILS
82 F multiple comorbidities p/w fever, cough.  will obtain labs, urine, cultures, cxr, rvp, give fluids, abx and likely admit

## 2017-09-21 NOTE — ED ADULT NURSE NOTE - PMH
CKD (chronic kidney disease)    Dementia    Dementia with behavioral disturbance, unspecified dementia type    HTN (hypertension)    Knee effusion, right  chronic knee infection  Neurogenic bladder disorder    Ventral hernia without obstruction or gangrene

## 2017-09-21 NOTE — ED ADULT NURSE NOTE - OBJECTIVE STATEMENT
83 y/o F to ED with  and son c/o fever and decreased appetite with cough x2 days.  A&O to person and placed at baseline, pt knew season, reoriented to year.  PERRL at 3mm.  Denies headache or vision changes.  No SOB.  Easy work of breathing.  +productive cough.  Med lock 18 R forearm.  Labs drawn.  Abd soft round nontender.  No n/v/d.  Pt has Chris catheter from home, changed 2 weeks ago, draining, as per family no changes.  +sick contacts, granddaughters both home from school with sore throats.  No edema.  Son at bedside. Will continue to monitor.

## 2017-09-21 NOTE — H&P ADULT - PROBLEM SELECTOR PLAN 4
not on any medication at this time Ua positive but likely colonization  will hold off antibiotics at this time

## 2017-09-21 NOTE — ED PROVIDER NOTE - PHYSICAL EXAMINATION
Gen: NAD, AOx3  Head: NCAT  HEENT: PERRL, oral mucosa moist, no erythema or exudates of throat, normal conjunctiva  Lung: crackles of left upper lung field and scattered wheezes, no respiratory distress  CV: rrr, no murmurs, Normal perfusion  Abd: soft, NTND  MSK: No edema, no visible deformities  Skin: No rash   - Libra Marshall, DO

## 2017-09-21 NOTE — H&P ADULT - PROBLEM SELECTOR PLAN 1
Patient with cough and fever, found to have positive RVP for influenza  s/p oseltamivir 30mg once  will continue oseltamivir 30mg qdaily  c/w MARLON rosales  droplet precaution

## 2017-09-21 NOTE — H&P ADULT - HISTORY OF PRESENT ILLNESS
81 yo F, with PMH of CKD 4, dementia w/behavioral disturbances, neurogenic bladder w/chronic roque, right knee periprosthetic fracture presents here with fever and decreased PO intake.      82F w/CKD 4 one kidney, dementia with behavioral disturbances, neurogenic bladder w/chronic roque, R knee periprosthetic fracture presents w/ acute on chronic renal failure likely prerenal azotemia/ATN, creatinine is now at baseline, patient with anemia likely due CKD - started on epogen. Patient fell at home was at rehab for R knee periprosthetic fracture and brought in to hospital - d/c plan to [facility]  Problem/Plan - 1:  · Problem: Acute on chronic renal failure. Plan: -Likely pre-renal in setting of CKD IV  -Patient initially req Bicarb drip then switched to bicarb 650mg TID  -Encourage oral hydration  - Romel Schilling from renal consulted  -Asymptomatic bacteruria likely due to chronic roque, monitor off of antibiotic.   Problem/Plan - 2:  · Problem: Chronic indwelling Roque catheter. Plan: c/w roque for neurogenic bladder.   Problem/Plan - 3:  · Problem: Dementia with behavioral disturbance, unspecified dementia type. At [facility] on last admission had episode of agitation and started on risperdal - since rehab has been tapering. During this admission did not receive any doses. Will discontinue and monitor d/w PCP office  Decrease remeron to 15mg qhs for sleep.   Problem/Plan - 4:  · Problem: Physical deconditioning. Plan: PT.   Problem/Plan - 5:  · Problem: Essential hypertension. Plan: metoprolol.   Problem/Plan - 6:  Problem: Knee effusion, right. Plan: monitor knee, WBAT. Per discussion w/family knee is stable right now and further evaluation is not desired. 83 yo F, with PMH of CKD 4, dementia w/behavioral disturbances, neurogenic bladder w/chronic roque, right knee periprosthetic fracture presents here with fever and decreased PO intake.  Patient states she has not been feeling well last few days.  She otherwise denies any additional complaints.  However, on further interview with family members, patient has been weak and not herself last few days.  She Has been having cough and headache last two days.  No runny nose, bodyache or joint pain.  Yesterday patient had a fever of 100.8.  As per family, grand kids at home also have been sick last few days.  Denies any recent travel.  There is no SOB, chest pain, nausea, vomiting, abdominal pain or diarrhea.  Patient has a chronic indwelling catheter.     In ED, patient had BP of 196/100, fever of 100.6, HR of 86.  She was found to have RVP positive for influenza.  She was given 1L NS bolus, oseltamivir 30mg and duonebs. 83 yo F, with PMH of CKD 4, dementia w/behavioral disturbances, neurogenic bladder w/chronic roque, HTN, right knee periprosthetic fracture presents here with fever and decreased PO intake.  Patient states she has not been feeling well last few days.  She otherwise denies any additional complaints.  However, on further interview with family members, patient has been weak and not herself last few days.  She Has been having cough and headache last two days.  No runny nose, bodyache or joint pain.  Yesterday patient had a fever of 100.8.  As per family, grand kids at home also have been sick last few days.  Denies any recent travel.  There is no SOB, chest pain, nausea, vomiting, abdominal pain or diarrhea.  Patient has a chronic indwelling catheter.     In ED, patient had BP of 196/100, fever of 100.6, HR of 86.  She was found to have RVP positive for influenza.  She was given 1L NS bolus, oseltamivir 30mg and duonebs.

## 2017-09-21 NOTE — H&P ADULT - PMH
CKD (chronic kidney disease)    Dementia    Dementia with behavioral disturbance, unspecified dementia type    HTN (hypertension)    Hyperlipidemia    Knee effusion, right  chronic knee infection  Neurogenic bladder disorder    Ventral hernia without obstruction or gangrene

## 2017-09-21 NOTE — H&P ADULT - ASSESSMENT
81 yo F, with PMH of CKD 4, dementia w/behavioral disturbances, neurogenic bladder w/chronic roque, right knee periprosthetic fracture presents here with fever and decreased PO intake.

## 2017-09-21 NOTE — ED PROVIDER NOTE - PROGRESS NOTE DETAILS
Mart Garcia MD: spoke with dr. atkins service last Cr. 3.1 in june and sent in for admission, team requests geriatric service on [address] [place].

## 2017-09-21 NOTE — ED PROVIDER NOTE - OBJECTIVE STATEMENT
82 F pmh neurogenic bladder, chronic roque (last changed 10 days ago), CKD, anemia, HTN, dementia, HLD, recently in rehab for a fall and was found have UTI.  Pt was seen by PMD today for fever and decreased PO, headaches.  Non nausea/vomiting, abd pain, chest pain, shortness of breath.  Pt has productive cough since yesterday, fevers.  Sick contacts (kids) at home.  NO recent travel.      PMD: Dr. Karina Wilson

## 2017-09-22 DIAGNOSIS — A41.9 SEPSIS, UNSPECIFIED ORGANISM: ICD-10-CM

## 2017-09-22 DIAGNOSIS — Z29.9 ENCOUNTER FOR PROPHYLACTIC MEASURES, UNSPECIFIED: ICD-10-CM

## 2017-09-22 DIAGNOSIS — I10 ESSENTIAL (PRIMARY) HYPERTENSION: ICD-10-CM

## 2017-09-22 DIAGNOSIS — N31.9 NEUROMUSCULAR DYSFUNCTION OF BLADDER, UNSPECIFIED: ICD-10-CM

## 2017-09-22 LAB
ALBUMIN SERPL ELPH-MCNC: 2.9 G/DL — LOW (ref 3.3–5)
ALP SERPL-CCNC: 67 U/L — SIGNIFICANT CHANGE UP (ref 40–120)
ALT FLD-CCNC: 9 U/L — LOW (ref 10–45)
ANION GAP SERPL CALC-SCNC: 11 MMOL/L — SIGNIFICANT CHANGE UP (ref 5–17)
ANION GAP SERPL CALC-SCNC: 14 MMOL/L — SIGNIFICANT CHANGE UP (ref 5–17)
AST SERPL-CCNC: 23 U/L — SIGNIFICANT CHANGE UP (ref 10–40)
BASOPHILS # BLD AUTO: 0 K/UL — SIGNIFICANT CHANGE UP (ref 0–0.2)
BASOPHILS NFR BLD AUTO: 0 % — SIGNIFICANT CHANGE UP (ref 0–2)
BILIRUB SERPL-MCNC: 0.2 MG/DL — SIGNIFICANT CHANGE UP (ref 0.2–1.2)
BUN SERPL-MCNC: 33 MG/DL — HIGH (ref 7–23)
BUN SERPL-MCNC: 34 MG/DL — HIGH (ref 7–23)
CALCIUM SERPL-MCNC: 9.2 MG/DL — SIGNIFICANT CHANGE UP (ref 8.4–10.5)
CALCIUM SERPL-MCNC: 9.5 MG/DL — SIGNIFICANT CHANGE UP (ref 8.4–10.5)
CHLORIDE SERPL-SCNC: 101 MMOL/L — SIGNIFICANT CHANGE UP (ref 96–108)
CHLORIDE SERPL-SCNC: 106 MMOL/L — SIGNIFICANT CHANGE UP (ref 96–108)
CO2 SERPL-SCNC: 22 MMOL/L — SIGNIFICANT CHANGE UP (ref 22–31)
CO2 SERPL-SCNC: 27 MMOL/L — SIGNIFICANT CHANGE UP (ref 22–31)
CREAT SERPL-MCNC: 2.93 MG/DL — HIGH (ref 0.5–1.3)
CREAT SERPL-MCNC: 3 MG/DL — HIGH (ref 0.5–1.3)
CULTURE RESULTS: SIGNIFICANT CHANGE UP
EOSINOPHIL # BLD AUTO: 0 K/UL — SIGNIFICANT CHANGE UP (ref 0–0.5)
EOSINOPHIL NFR BLD AUTO: 0 % — SIGNIFICANT CHANGE UP (ref 0–6)
GLUCOSE SERPL-MCNC: 87 MG/DL — SIGNIFICANT CHANGE UP (ref 70–99)
GLUCOSE SERPL-MCNC: 88 MG/DL — SIGNIFICANT CHANGE UP (ref 70–99)
HCT VFR BLD CALC: 32.9 % — LOW (ref 34.5–45)
HGB BLD-MCNC: 9.9 G/DL — LOW (ref 11.5–15.5)
LYMPHOCYTES # BLD AUTO: 0.53 K/UL — LOW (ref 1–3.3)
LYMPHOCYTES # BLD AUTO: 11.4 % — LOW (ref 13–44)
MAGNESIUM SERPL-MCNC: 2.3 MG/DL — SIGNIFICANT CHANGE UP (ref 1.6–2.6)
MCHC RBC-ENTMCNC: 28.7 PG — SIGNIFICANT CHANGE UP (ref 27–34)
MCHC RBC-ENTMCNC: 30.1 GM/DL — LOW (ref 32–36)
MCV RBC AUTO: 95.4 FL — SIGNIFICANT CHANGE UP (ref 80–100)
MONOCYTES # BLD AUTO: 0.57 K/UL — SIGNIFICANT CHANGE UP (ref 0–0.9)
MONOCYTES NFR BLD AUTO: 12.3 % — SIGNIFICANT CHANGE UP (ref 2–14)
NEUTROPHILS # BLD AUTO: 3.39 K/UL — SIGNIFICANT CHANGE UP (ref 1.8–7.4)
NEUTROPHILS NFR BLD AUTO: 71.8 % — SIGNIFICANT CHANGE UP (ref 43–77)
PHOSPHATE SERPL-MCNC: 4.2 MG/DL — SIGNIFICANT CHANGE UP (ref 2.5–4.5)
PLATELET # BLD AUTO: 216 K/UL — SIGNIFICANT CHANGE UP (ref 150–400)
POTASSIUM SERPL-MCNC: 4.3 MMOL/L — SIGNIFICANT CHANGE UP (ref 3.5–5.3)
POTASSIUM SERPL-MCNC: 5.5 MMOL/L — HIGH (ref 3.5–5.3)
POTASSIUM SERPL-SCNC: 4.3 MMOL/L — SIGNIFICANT CHANGE UP (ref 3.5–5.3)
POTASSIUM SERPL-SCNC: 5.5 MMOL/L — HIGH (ref 3.5–5.3)
PROT SERPL-MCNC: 7.1 G/DL — SIGNIFICANT CHANGE UP (ref 6–8.3)
RBC # BLD: 3.45 M/UL — LOW (ref 3.8–5.2)
RBC # FLD: 16 % — HIGH (ref 10.3–14.5)
SODIUM SERPL-SCNC: 137 MMOL/L — SIGNIFICANT CHANGE UP (ref 135–145)
SODIUM SERPL-SCNC: 144 MMOL/L — SIGNIFICANT CHANGE UP (ref 135–145)
SPECIMEN SOURCE: SIGNIFICANT CHANGE UP
WBC # BLD: 4.61 K/UL — SIGNIFICANT CHANGE UP (ref 3.8–10.5)
WBC # FLD AUTO: 4.61 K/UL — SIGNIFICANT CHANGE UP (ref 3.8–10.5)

## 2017-09-22 PROCEDURE — 93010 ELECTROCARDIOGRAM REPORT: CPT

## 2017-09-22 PROCEDURE — 99233 SBSQ HOSP IP/OBS HIGH 50: CPT

## 2017-09-22 RX ORDER — LABETALOL HCL 100 MG
20 TABLET ORAL ONCE
Qty: 0 | Refills: 0 | Status: COMPLETED | OUTPATIENT
Start: 2017-09-22 | End: 2017-09-22

## 2017-09-22 RX ORDER — SODIUM CHLORIDE 9 MG/ML
1000 INJECTION INTRAMUSCULAR; INTRAVENOUS; SUBCUTANEOUS
Qty: 0 | Refills: 0 | Status: DISCONTINUED | OUTPATIENT
Start: 2017-09-22 | End: 2017-09-23

## 2017-09-22 RX ORDER — MIRTAZAPINE 45 MG/1
15 TABLET, ORALLY DISINTEGRATING ORAL AT BEDTIME
Qty: 0 | Refills: 0 | Status: DISCONTINUED | OUTPATIENT
Start: 2017-09-22 | End: 2017-09-26

## 2017-09-22 RX ORDER — DEXTROSE 50 % IN WATER 50 %
50 SYRINGE (ML) INTRAVENOUS ONCE
Qty: 0 | Refills: 0 | Status: COMPLETED | OUTPATIENT
Start: 2017-09-22 | End: 2017-09-22

## 2017-09-22 RX ORDER — SODIUM POLYSTYRENE SULFONATE 4.1 MEQ/G
30 POWDER, FOR SUSPENSION ORAL ONCE
Qty: 0 | Refills: 0 | Status: COMPLETED | OUTPATIENT
Start: 2017-09-22 | End: 2017-09-22

## 2017-09-22 RX ORDER — INSULIN HUMAN 100 [IU]/ML
10 INJECTION, SOLUTION SUBCUTANEOUS ONCE
Qty: 0 | Refills: 0 | Status: COMPLETED | OUTPATIENT
Start: 2017-09-22 | End: 2017-09-22

## 2017-09-22 RX ADMIN — SIMVASTATIN 20 MILLIGRAM(S): 20 TABLET, FILM COATED ORAL at 21:12

## 2017-09-22 RX ADMIN — Medication 50 MILLIGRAM(S): at 18:11

## 2017-09-22 RX ADMIN — Medication 50 MILLIGRAM(S): at 06:11

## 2017-09-22 RX ADMIN — Medication 100 MILLIGRAM(S): at 18:11

## 2017-09-22 RX ADMIN — Medication 100 MILLIGRAM(S): at 06:12

## 2017-09-22 RX ADMIN — Medication 3 MILLILITER(S): at 06:11

## 2017-09-22 RX ADMIN — SODIUM POLYSTYRENE SULFONATE 30 GRAM(S): 4.1 POWDER, FOR SUSPENSION ORAL at 11:52

## 2017-09-22 RX ADMIN — SODIUM CHLORIDE 75 MILLILITER(S): 9 INJECTION INTRAMUSCULAR; INTRAVENOUS; SUBCUTANEOUS at 01:17

## 2017-09-22 RX ADMIN — Medication 3 MILLILITER(S): at 23:01

## 2017-09-22 RX ADMIN — Medication 20 MILLIGRAM(S): at 01:57

## 2017-09-22 RX ADMIN — HEPARIN SODIUM 5000 UNIT(S): 5000 INJECTION INTRAVENOUS; SUBCUTANEOUS at 06:11

## 2017-09-22 RX ADMIN — MIRTAZAPINE 15 MILLIGRAM(S): 45 TABLET, ORALLY DISINTEGRATING ORAL at 23:01

## 2017-09-22 RX ADMIN — Medication 650 MILLIGRAM(S): at 21:12

## 2017-09-22 RX ADMIN — Medication 3 MILLILITER(S): at 11:53

## 2017-09-22 RX ADMIN — INSULIN HUMAN 10 UNIT(S): 100 INJECTION, SOLUTION SUBCUTANEOUS at 11:52

## 2017-09-22 RX ADMIN — Medication 650 MILLIGRAM(S): at 08:15

## 2017-09-22 RX ADMIN — Medication 30 MILLIGRAM(S): at 21:12

## 2017-09-22 RX ADMIN — SODIUM CHLORIDE 70 MILLILITER(S): 9 INJECTION INTRAMUSCULAR; INTRAVENOUS; SUBCUTANEOUS at 12:05

## 2017-09-22 RX ADMIN — Medication 100 MILLIGRAM(S): at 11:53

## 2017-09-22 RX ADMIN — Medication 3 MILLILITER(S): at 01:16

## 2017-09-22 RX ADMIN — Medication 650 MILLIGRAM(S): at 06:11

## 2017-09-22 RX ADMIN — PANTOPRAZOLE SODIUM 40 MILLIGRAM(S): 20 TABLET, DELAYED RELEASE ORAL at 06:11

## 2017-09-22 RX ADMIN — Medication 650 MILLIGRAM(S): at 18:13

## 2017-09-22 RX ADMIN — Medication 3 MILLILITER(S): at 18:11

## 2017-09-22 RX ADMIN — Medication 50 MILLILITER(S): at 11:52

## 2017-09-22 RX ADMIN — Medication 650 MILLIGRAM(S): at 15:36

## 2017-09-22 RX ADMIN — HEPARIN SODIUM 5000 UNIT(S): 5000 INJECTION INTRAVENOUS; SUBCUTANEOUS at 18:11

## 2017-09-22 RX ADMIN — Medication 100 MILLIGRAM(S): at 01:17

## 2017-09-22 RX ADMIN — Medication 100 MILLIGRAM(S): at 23:01

## 2017-09-22 RX ADMIN — Medication 325 MILLIGRAM(S): at 08:11

## 2017-09-22 NOTE — PROGRESS NOTE ADULT - PROBLEM SELECTOR PLAN 3
NELL on CKD IV in setting of decreased PO intake  -hydrate, continue to monitor Creatinine  -renal knows patient well, discussed case.  Will see patient today.

## 2017-09-22 NOTE — PROGRESS NOTE ADULT - PROBLEM SELECTOR PLAN 2
-in setting of NELL  -mildly elevated T waves compared to prior admission.  -will give D5, insulin,kayexalate, recheck BMP

## 2017-09-22 NOTE — CONSULT NOTE ADULT - ASSESSMENT
81 yo F, with PMH of CKD 4, dementia, neurogenic bladder w/chronic roque, HTN, right knee periprosthetic fracture hx with influenza + with diarrhea.   1- NELL on ckd IV baseline cr 4   2- hyperkalemia  3- htn  4- anemia    cr worsened secondary to pre renal azotemia due to volume depletion.   ivf hydration   low k diet and repeat k . kayexalate    cont with metoprolol  neurogenic bladder cont with roque catheter

## 2017-09-22 NOTE — PROGRESS NOTE ADULT - SUBJECTIVE AND OBJECTIVE BOX
Authored by Solitario Cesar, DO: Beeper#[phone]    RADHA CHAUDHRY  82y  Female      Patient is a 82y old  Female who presents with a chief complaint of Fever and decreased PO (21 Sep 2017 21:31)      INTERVAL HPI/OVERNIGHT EVENTS:  Patient admitted for SOB and fevers.  Patient states has been coughing for 3-4 dys, started having fevers yesterday.  Admits to chills, fever, non-productive cough.  States decreased PO intake over past 3 days, not eating or drinking anythin glast 24 hours.  Denies myalgias, abdominal pain, CP, n/v/d/c, recent travel.    T(C): 38.1 (17 @ 08:15), Max: 38.1 (17 @ 16:05)  HR: 93 (17 @ 05:51) (80 - 103)  BP: 165/61 (17 @ 05:51) (151/77 - 200/98)  RR: 18 (17 @ 05:51) (16 - 20)  SpO2: 97% (17 @ 05:51) (95% - 97%)  Wt(kg): --Vital Signs Last 24 Hrs  T(C): 38.1 (22 Sep 2017 08:15), Max: 38.1 (21 Sep 2017 16:05)  T(F): 100.6 (22 Sep 2017 08:15), Max: 100.6 (21 Sep 2017 16:05)  HR: 93 (22 Sep 2017 05:51) (80 - 103)  BP: 165/61 (22 Sep 2017 05:51) (151/77 - 200/98)  BP(mean): --  RR: 18 (22 Sep 2017 05:51) (16 - 20)  SpO2: 97% (22 Sep 2017 05:51) (95% - 97%)  Wt(kg): --    PHYSICAL EXAM:  GEN: NAD, sitting in bed  HEENT: EOMI.  Anicteric Sclera.  dry mucous membranes  CARDS: +S1S2.  rrr.  No m/r/g  RESP: Diffuse expiratory rhonchi.  No rales or wheezing.  ABD: Soft. NT/ND. +BS  : Chris catheter in place  EXT: NO C/C/E    Consultant(s) Notes Reviewed:  [x ] YES  [ ] NO  Care Discussed with Consultants/Other Providers [ x] YES  [ ] NO    LABS:                        9.9    4.61  )-----------( 216      ( 22 Sep 2017 07:39 )             32.9     -    137  |  101  |  34<H>  ----------------------------<  88  5.5<H>   |  22  |  3.00<H>    Ca    9.5      22 Sep 2017 08:02  Phos  4.2       Mg     2.3         TPro  7.1  /  Alb  2.9<L>  /  TBili  0.2  /  DBili  x   /  AST  23  /  ALT  9<L>  /  AlkPhos  67        Urinalysis Basic - ( 21 Sep 2017 23:02 )    Color: x / Appearance: x / S.007 / pH: x  Gluc: x / Ketone: Negative  / Bili: Negative / Urobili: Negative   Blood: x / Protein: 30 mg/dL / Nitrite: Negative   Leuk Esterase: Large / RBC: 10-25 /HPF / WBC >50 /HPF   Sq Epi: x / Non Sq Epi: OCC /HPF / Bacteria: Few /HPF      CAPILLARY BLOOD GLUCOSE            Urinalysis Basic - ( 21 Sep 2017 23:02 )    Color: x / Appearance: x / S.007 / pH: x  Gluc: x / Ketone: Negative  / Bili: Negative / Urobili: Negative   Blood: x / Protein: 30 mg/dL / Nitrite: Negative   Leuk Esterase: Large / RBC: 10-25 /HPF / WBC >50 /HPF   Sq Epi: x / Non Sq Epi: OCC /HPF / Bacteria: Few /HPF        RADIOLOGY & ADDITIONAL TESTS:    Imaging Personally Reviewed:  [x ] YES  [ ] No: CXR personally reviewed: rotated to rightr.  Tortuous Aorta.  No acute pulmonary consolidation or effusion noted.  ECG personally reviewed:  NSR with elevated T wave compared to admission ECG as well as prior ECG last admission.

## 2017-09-22 NOTE — CONSULT NOTE ADULT - SUBJECTIVE AND OBJECTIVE BOX
[facility]  [phone]  NEPHROLOGY      INITIAL CONSULT NOTE  --------------------------------------------------------------------------------  HPI:    HPI:  83 yo F, with PMH of CKD 4, dementia w/behavioral disturbances, neurogenic bladder w/chronic roque, HTN, right knee periprosthetic fracture presents here with fever and decreased PO intake.  Yesterday patient had a fever of 100.8.  In ED, patient had BP of 196/100, fever of 100.6, HR of 86.  She was found to have RVP positive for influenza.  She was given 1L NS bolus, oseltamivir 30mg and duonebs. (21 Sep 2017 21:31)      PAST HISTORY  --------------------------------------------------------------------------------  PAST MEDICAL & SURGICAL HISTORY:  Hyperlipidemia  Ventral hernia without obstruction or gangrene  Dementia with behavioral disturbance, unspecified dementia type  Knee effusion, right: chronic knee infection  Dementia  HTN (hypertension)  Neurogenic bladder disorder  CKD (chronic kidney disease)  History of bladder surgery  History of lumbar fusion  Status post total bilateral knee replacement  No significant past surgical history    FAMILY HISTORY:  Family history of acute myocardial infarction (Father)    PAST SOCIAL HISTORY: no tobacco use.     ALLERGIES & MEDICATIONS  --------------------------------------------------------------------------------  Allergies    No Known Allergies    Intolerances      Standing Inpatient Medications  sodium bicarbonate 650 milliGRAM(s) Oral three times a day  simvastatin 20 milliGRAM(s) Oral at bedtime  metoprolol 50 milliGRAM(s) Oral two times a day  ferrous    sulfate 325 milliGRAM(s) Oral two times a day with meals  pantoprazole    Tablet 40 milliGRAM(s) Oral before breakfast  heparin  Injectable 5000 Unit(s) SubCutaneous every 12 hours  ALBUTerol/ipratropium for Nebulization 3 milliLiter(s) Nebulizer every 6 hours  oseltamivir 30 milliGRAM(s) Oral daily  guaiFENesin   Syrup  (Sugar-Free) 100 milliGRAM(s) Oral every 6 hours  sodium chloride 0.9%. 1000 milliLiter(s) IV Continuous <Continuous>  mirtazapine 15 milliGRAM(s) Oral at bedtime    PRN Inpatient Medications  acetaminophen   Tablet 650 milliGRAM(s) Oral every 6 hours PRN      REVIEW OF SYSTEMS  --------------------------------------------------------------------------------  Gen: + fevers/chills,   Skin: No rashes  Head/Eyes/Ears/Mouth: No headache; No sinus pain/discomfort, sore throat  Respiratory: No dyspnea, cough, wheezing, hemoptysis  CV: No chest pain, or palp  PND, orthopnea  GI: No abdominal pain, + diarrhea, - nausea, -vomiting, + decrease po intake  : No increased frequency, dysuria, states has roque catheter  MSK: No joint pain/swelling; no back pain; no edema      All other systems were reviewed and are negative, except as noted.    VITALS/PHYSICAL EXAM  --------------------------------------------------------------------------------  T(C): 37.2 (09-22-17 @ 13:51), Max: 38.1 (09-21-17 @ 16:05)  HR: 92 (09-22-17 @ 13:51) (80 - 103)  BP: 135/83 (09-22-17 @ 13:51) (135/83 - 200/98)  RR: 18 (09-22-17 @ 13:51) (16 - 20)  SpO2: 95% (09-22-17 @ 13:51) (95% - 97%)  Wt(kg): --  Height (cm): 154.94 (09-21-17 @ 23:57)  Weight (kg): 57.9 (09-21-17 @ 23:57)  BMI (kg/m2): 24.1 (09-21-17 @ 23:57)  BSA (m2): 1.56 (09-21-17 @ 23:57)      09-21-17 @ 07:01  -  09-22-17 @ 07:00  --------------------------------------------------------  IN: 0 mL / OUT: 500 mL / NET: -500 mL    09-22-17 @ 07:01  -  09-22-17 @ 14:34  --------------------------------------------------------  IN: 0 mL / OUT: 0 mL / NET: 0 mL      Physical Exam:  	Gen: oriented to person. + dementia. communicative  	no jvd supple neck,   	Pulm: CTA B/L no rales or ronchi  	CV: RRR, S1S2; no rub  	Abd: +BS, soft, nontender/nondistended  	: No suprapubic tenderness  	LE: no clubbing, no cyanosis no edema  	Neuro: dementia oriented to person and place  	Skin: scaly skin decrease skin turgor  	    LABS/STUDIES  --------------------------------------------------------------------------------              9.9    4.61  >-----------<  216      [09-22-17 @ 07:39]              32.9     137  |  101  |  34  ----------------------------<  88      [09-22-17 @ 08:02]  5.5   |  22  |  3.00        Ca     9.5     [09-22-17 @ 08:02]      Mg     2.3     [09-22-17 @ 08:02]      Phos  4.2     [09-22-17 @ 08:02]    TPro  7.1  /  Alb  2.9  /  TBili  0.2  /  DBili  x   /  AST  23  /  ALT  9   /  AlkPhos  67  [09-22-17 @ 08:02]          Creatinine Trend:  SCr 3.00 [09-22 @ 08:02]  SCr 3.15 [09-21 @ 21:52]  SCr 3.12 [09-21 @ 18:43]    Urinalysis - [09-21-17 @ 23:02]      Color  / Appearance  / SG 1.007 / pH 7.0      Gluc Negative / Ketone Negative  / Bili Negative / Urobili Negative       Blood Large / Protein 30 / Leuk Est Large / Nitrite Negative      RBC 10-25 / WBC >50 / Hyaline  / Gran  / Sq Epi  / Non Sq Epi OCC / Bacteria Few      Iron 18, TIBC 173, %sat 10      [07-30-17 @ 12:34]  Ferritin 842.0      [07-30-17 @ 15:24]  PTH -- (Ca 8.9)      [05-05-17 @ 07:21]   32  Vitamin D (25OH) 116.0      [07-30-17 @ 15:24]  HbA1c 5.2      [07-30-17 @ 15:24]  TSH 5.58      [08-11-17 @ 07:12]  Lipid: chol 149, TG 65, HDL 49, LDL 87      [07-30-17 @ 12:34]

## 2017-09-22 NOTE — PROGRESS NOTE ADULT - PROBLEM SELECTOR PLAN 1
-patient with fever, tachycardia, lactate 2.1, officially qualified as severe sepsis (did not meet by Creatinine standard)  -in setting of influenza A positive.  -Agree with NO antibiotics as has a viral infection with a normal CXR despite severe sepsis criteria.  -continue with tamiflu given admitted for influenza  -s/p 1L in ED, wll give maintenance fluid for dehydration and not eating, not for sepsis.  -maintaining MAP>65

## 2017-09-23 LAB
ANION GAP SERPL CALC-SCNC: 11 MMOL/L — SIGNIFICANT CHANGE UP (ref 5–17)
BUN SERPL-MCNC: 29 MG/DL — HIGH (ref 7–23)
CALCIUM SERPL-MCNC: 9.3 MG/DL — SIGNIFICANT CHANGE UP (ref 8.4–10.5)
CHLORIDE SERPL-SCNC: 106 MMOL/L — SIGNIFICANT CHANGE UP (ref 96–108)
CO2 SERPL-SCNC: 24 MMOL/L — SIGNIFICANT CHANGE UP (ref 22–31)
CREAT SERPL-MCNC: 2.89 MG/DL — HIGH (ref 0.5–1.3)
GLUCOSE SERPL-MCNC: 78 MG/DL — SIGNIFICANT CHANGE UP (ref 70–99)
HCT VFR BLD CALC: 33.3 % — LOW (ref 34.5–45)
HGB BLD-MCNC: 10 G/DL — LOW (ref 11.5–15.5)
MCHC RBC-ENTMCNC: 28.7 PG — SIGNIFICANT CHANGE UP (ref 27–34)
MCHC RBC-ENTMCNC: 30 GM/DL — LOW (ref 32–36)
MCV RBC AUTO: 95.4 FL — SIGNIFICANT CHANGE UP (ref 80–100)
PLATELET # BLD AUTO: 219 K/UL — SIGNIFICANT CHANGE UP (ref 150–400)
POTASSIUM SERPL-MCNC: 4.6 MMOL/L — SIGNIFICANT CHANGE UP (ref 3.5–5.3)
POTASSIUM SERPL-SCNC: 4.6 MMOL/L — SIGNIFICANT CHANGE UP (ref 3.5–5.3)
RBC # BLD: 3.49 M/UL — LOW (ref 3.8–5.2)
RBC # FLD: 16 % — HIGH (ref 10.3–14.5)
SODIUM SERPL-SCNC: 141 MMOL/L — SIGNIFICANT CHANGE UP (ref 135–145)
WBC # BLD: 5.17 K/UL — SIGNIFICANT CHANGE UP (ref 3.8–10.5)
WBC # FLD AUTO: 5.17 K/UL — SIGNIFICANT CHANGE UP (ref 3.8–10.5)

## 2017-09-23 PROCEDURE — 99233 SBSQ HOSP IP/OBS HIGH 50: CPT

## 2017-09-23 RX ORDER — HYDRALAZINE HCL 50 MG
25 TABLET ORAL EVERY 8 HOURS
Qty: 0 | Refills: 0 | Status: DISCONTINUED | OUTPATIENT
Start: 2017-09-23 | End: 2017-09-26

## 2017-09-23 RX ORDER — SODIUM CHLORIDE 9 MG/ML
1000 INJECTION INTRAMUSCULAR; INTRAVENOUS; SUBCUTANEOUS
Qty: 0 | Refills: 0 | Status: DISCONTINUED | OUTPATIENT
Start: 2017-09-23 | End: 2017-09-23

## 2017-09-23 RX ORDER — HYDRALAZINE HCL 50 MG
5 TABLET ORAL ONCE
Qty: 0 | Refills: 0 | Status: COMPLETED | OUTPATIENT
Start: 2017-09-23 | End: 2017-09-23

## 2017-09-23 RX ADMIN — Medication 100 MILLIGRAM(S): at 18:04

## 2017-09-23 RX ADMIN — PANTOPRAZOLE SODIUM 40 MILLIGRAM(S): 20 TABLET, DELAYED RELEASE ORAL at 05:58

## 2017-09-23 RX ADMIN — Medication 30 MILLIGRAM(S): at 14:26

## 2017-09-23 RX ADMIN — Medication 100 MILLIGRAM(S): at 21:11

## 2017-09-23 RX ADMIN — Medication 25 MILLIGRAM(S): at 21:11

## 2017-09-23 RX ADMIN — HEPARIN SODIUM 5000 UNIT(S): 5000 INJECTION INTRAVENOUS; SUBCUTANEOUS at 05:58

## 2017-09-23 RX ADMIN — Medication 3 MILLILITER(S): at 05:58

## 2017-09-23 RX ADMIN — Medication 3 MILLILITER(S): at 20:00

## 2017-09-23 RX ADMIN — Medication 5 MILLIGRAM(S): at 18:30

## 2017-09-23 RX ADMIN — HEPARIN SODIUM 5000 UNIT(S): 5000 INJECTION INTRAVENOUS; SUBCUTANEOUS at 18:05

## 2017-09-23 RX ADMIN — SIMVASTATIN 20 MILLIGRAM(S): 20 TABLET, FILM COATED ORAL at 21:11

## 2017-09-23 RX ADMIN — Medication 650 MILLIGRAM(S): at 05:58

## 2017-09-23 RX ADMIN — Medication 650 MILLIGRAM(S): at 14:27

## 2017-09-23 RX ADMIN — Medication 3 MILLILITER(S): at 11:56

## 2017-09-23 RX ADMIN — Medication 50 MILLIGRAM(S): at 18:05

## 2017-09-23 RX ADMIN — Medication 650 MILLIGRAM(S): at 21:11

## 2017-09-23 RX ADMIN — Medication 325 MILLIGRAM(S): at 18:04

## 2017-09-23 RX ADMIN — Medication 325 MILLIGRAM(S): at 08:43

## 2017-09-23 RX ADMIN — Medication 100 MILLIGRAM(S): at 05:58

## 2017-09-23 RX ADMIN — SODIUM CHLORIDE 70 MILLILITER(S): 9 INJECTION INTRAMUSCULAR; INTRAVENOUS; SUBCUTANEOUS at 11:00

## 2017-09-23 RX ADMIN — Medication 100 MILLIGRAM(S): at 11:56

## 2017-09-23 RX ADMIN — MIRTAZAPINE 15 MILLIGRAM(S): 45 TABLET, ORALLY DISINTEGRATING ORAL at 21:11

## 2017-09-23 RX ADMIN — Medication 50 MILLIGRAM(S): at 05:58

## 2017-09-23 NOTE — PROGRESS NOTE ADULT - SUBJECTIVE AND OBJECTIVE BOX
[facility]   [phone]  RENAL FOLLOW UP NOTE  --------------------------------------------------------------------------------  Chief Complaint:    24 hour events/subjective:    states feels better today no further loose bm no runny nose. no joint aches    PAST HISTORY  --------------------------------------------------------------------------------  No significant changes to PMH, PSH, FHx, SHx, unless otherwise noted    ALLERGIES & MEDICATIONS  --------------------------------------------------------------------------------  Allergies    No Known Allergies    Intolerances      Standing Inpatient Medications  sodium bicarbonate 650 milliGRAM(s) Oral three times a day  simvastatin 20 milliGRAM(s) Oral at bedtime  metoprolol 50 milliGRAM(s) Oral two times a day  ferrous    sulfate 325 milliGRAM(s) Oral two times a day with meals  pantoprazole    Tablet 40 milliGRAM(s) Oral before breakfast  heparin  Injectable 5000 Unit(s) SubCutaneous every 12 hours  ALBUTerol/ipratropium for Nebulization 3 milliLiter(s) Nebulizer every 6 hours  oseltamivir 30 milliGRAM(s) Oral daily  guaiFENesin   Syrup  (Sugar-Free) 100 milliGRAM(s) Oral every 6 hours  mirtazapine 15 milliGRAM(s) Oral at bedtime  sodium chloride 0.9%. 1000 milliLiter(s) IV Continuous <Continuous>    PRN Inpatient Medications  acetaminophen   Tablet 650 milliGRAM(s) Oral every 6 hours PRN      REVIEW OF SYSTEMS  --------------------------------------------------------------------------------    Gen: denies fevers/chills,  CVS: denies chest pain/palpitations  Resp: denies SOB/ + improved Cough  GI: Denies N/V/Abd pain  : Denies dysuria  eating better    All other systems were reviewed and are negative, except as noted.    VITALS/PHYSICAL EXAM  --------------------------------------------------------------------------------  T(C): 37.3 (09-23-17 @ 05:47), Max: 38.1 (09-22-17 @ 18:09)  HR: 73 (09-23-17 @ 07:05) (73 - 94)  BP: 167/83 (09-23-17 @ 07:05) (135/83 - 183/98)  RR: 18 (09-23-17 @ 05:47) (18 - 18)  SpO2: 94% (09-23-17 @ 05:47) (94% - 96%)  Wt(kg): --  Height (cm): 154.94 (09-21-17 @ 23:57)  Weight (kg): 57.9 (09-21-17 @ 23:57)  BMI (kg/m2): 24.1 (09-21-17 @ 23:57)  BSA (m2): 1.56 (09-21-17 @ 23:57)      09-22-17 @ 07:01  -  09-23-17 @ 07:00  --------------------------------------------------------  IN: 1105 mL / OUT: 1750 mL / NET: -645 mL      Gen: oriented to person. + dementia. communicative  	no jvd supple neck,   	Pulm: CTA B/L no rales or ronchi  	CV: RRR, S1S2; no rub  	Abd: +BS, soft, nontender/nondistended  	: No suprapubic tenderness  	LE: no clubbing, no cyanosis no edema  	Neuro: dementia oriented to person and place  	Skin: scaly skin . warm   LABS/STUDIES  --------------------------------------------------------------------------------              10.0   5.17  >-----------<  219      [09-23-17 @ 08:09]              33.3     141  |  106  |  29  ----------------------------<  78      [09-23-17 @ 08:32]  4.6   |  24  |  2.89        Ca     9.3     [09-23-17 @ 08:32]      Mg     2.3     [09-22-17 @ 08:02]      Phos  4.2     [09-22-17 @ 08:02]    TPro  7.1  /  Alb  2.9  /  TBili  0.2  /  DBili  x   /  AST  23  /  ALT  9   /  AlkPhos  67  [09-22-17 @ 08:02]          Creatinine Trend:  SCr 2.89 [09-23 @ 08:32]  SCr 2.93 [09-22 @ 16:14]  SCr 3.00 [09-22 @ 08:02]  SCr 3.15 [09-21 @ 21:52]  SCr 3.12 [09-21 @ 18:43]              Urinalysis - [09-21-17 @ 23:02]      Color  / Appearance  / SG 1.007 / pH 7.0      Gluc Negative / Ketone Negative  / Bili Negative / Urobili Negative       Blood Large / Protein 30 / Leuk Est Large / Nitrite Negative      RBC 10-25 / WBC >50 / Hyaline  / Gran  / Sq Epi  / Non Sq Epi OCC / Bacteria Few      Iron 18, TIBC 173, %sat 10      [07-30-17 @ 12:34]  Ferritin 842.0      [07-30-17 @ 15:24]  PTH -- (Ca 8.9)      [05-05-17 @ 07:21]   32  Vitamin D (25OH) 116.0      [07-30-17 @ 15:24]  HbA1c 5.2      [07-30-17 @ 15:24]  TSH 5.58      [08-11-17 @ 07:12]  Lipid: chol 149, TG 65, HDL 49, LDL 87      [07-30-17 @ 12:34]

## 2017-09-23 NOTE — PROGRESS NOTE ADULT - PROBLEM SELECTOR PLAN 6
continue with chronic roque - urine appears mostly clear. UA likely represents colonization and not infection.   - If the fevers do not resolve, consider treating with antibiotics for possible cathter associated UTI.

## 2017-09-23 NOTE — PROGRESS NOTE ADULT - PROBLEM SELECTOR PLAN 3
NELL on CKD IV likely due to dehydration (decreased PO intake)  -S/p IV hydration.   -Monitor Creatinine daily  - Nephrology following daily.

## 2017-09-23 NOTE — PROGRESS NOTE ADULT - PROBLEM SELECTOR PLAN 1
-patient with fever, tachycardia, lactate 2.1, officially qualified as severe sepsis rowan to influenza A infection  -CXR clear, but has diffuse expiratory wheeze on exam.   -continue with Tamiflu 30mg PO daily (renal dosing)  -Monitor ENMA - maintain MAP>65  - Continue Duonebs INH q6h standing - if wheezing worsens may need to initiate steroids.

## 2017-09-23 NOTE — PROGRESS NOTE ADULT - SUBJECTIVE AND OBJECTIVE BOX
Patient is a 82y old  Female who presents with a chief complaint of Fever and decreased PO (21 Sep 2017 21:31)      SUBJECTIVE / OVERNIGHT EVENTS: The patient continued to have low grade fevers. Complaining of chills.     MEDICATIONS  (STANDING):  sodium bicarbonate 650 milliGRAM(s) Oral three times a day  simvastatin 20 milliGRAM(s) Oral at bedtime  metoprolol 50 milliGRAM(s) Oral two times a day  ferrous    sulfate 325 milliGRAM(s) Oral two times a day with meals  pantoprazole    Tablet 40 milliGRAM(s) Oral before breakfast  heparin  Injectable 5000 Unit(s) SubCutaneous every 12 hours  ALBUTerol/ipratropium for Nebulization 3 milliLiter(s) Nebulizer every 6 hours  oseltamivir 30 milliGRAM(s) Oral daily  guaiFENesin   Syrup  (Sugar-Free) 100 milliGRAM(s) Oral every 6 hours  mirtazapine 15 milliGRAM(s) Oral at bedtime  sodium chloride 0.9%. 1000 milliLiter(s) (70 mL/Hr) IV Continuous <Continuous>    MEDICATIONS  (PRN):  acetaminophen   Tablet 650 milliGRAM(s) Oral every 6 hours PRN For Temp greater than 38 C (100.4 F)      Vital Signs Last 24 Hrs  T(C): 37.2 (23 Sep 2017 13:38), Max: 38.1 (22 Sep 2017 18:09)  T(F): 99 (23 Sep 2017 13:38), Max: 100.6 (22 Sep 2017 18:09)  HR: 91 (23 Sep 2017 13:38) (73 - 94)  BP: 152/82 (23 Sep 2017 13:38) (135/83 - 183/98)  BP(mean): --  RR: 18 (23 Sep 2017 13:38) (18 - 18)  SpO2: 98% (23 Sep 2017 13:38) (94% - 98%)  CAPILLARY BLOOD GLUCOSE        I&O's Summary    22 Sep 2017 07:01  -  23 Sep 2017 07:00  --------------------------------------------------------  IN: 1105 mL / OUT: 1750 mL / NET: -645 mL    23 Sep 2017 07:01  -  23 Sep 2017 13:43  --------------------------------------------------------  IN: 240 mL / OUT: 0 mL / NET: 240 mL        PHYSICAL EXAM:  GENERAL: NAD, well-developed  HEAD:  Atraumatic, Normocephalic  EYES: EOMI, PERRLA, conjunctiva and sclera clear  CHEST/LUNG: bilateral expiratory wheezing  HEART: Regular rate and rhythm; No murmurs, rubs, or gallops  ABDOMEN: Soft, Nontender, Nondistended; Bowel sounds present  EXTREMITIES:  No clubbing, cyanosis, or edema  PSYCH: AAOx3  NEUROLOGY: non-focal  SKIN: No rashes or lesions      LABS:                        10.0   5.17  )-----------( 219      ( 23 Sep 2017 08:09 )             33.3         141  |  106  |  29<H>  ----------------------------<  78  4.6   |  24  |  2.89<H>    Ca    9.3      23 Sep 2017 08:32  Phos  4.2       Mg     2.3         TPro  7.1  /  Alb  2.9<L>  /  TBili  0.2  /  DBili  x   /  AST  23  /  ALT  9<L>  /  AlkPhos  67            Urinalysis Basic - ( 21 Sep 2017 23:02 )    Color: x / Appearance: x / S.007 / pH: x  Gluc: x / Ketone: Negative  / Bili: Negative / Urobili: Negative   Blood: x / Protein: 30 mg/dL / Nitrite: Negative   Leuk Esterase: Large / RBC: 10-25 /HPF / WBC >50 /HPF   Sq Epi: x / Non Sq Epi: OCC /HPF / Bacteria: Few /HPF        RADIOLOGY & ADDITIONAL TESTS:    Imaging Personally Reviewed: CXR - clear lung fields bilaterally     Consultant(s) Notes Reviewed: Nephrology

## 2017-09-23 NOTE — PROGRESS NOTE ADULT - PROBLEM SELECTOR PLAN 2
-due to NELL on CKD stage 4  -mildly elevated T waves compared to prior admission.  -s/p D5, insulin, Kayexalate  - Potasium 4.6 today - improved.

## 2017-09-24 LAB
ALBUMIN SERPL ELPH-MCNC: 2.9 G/DL — LOW (ref 3.3–5)
ALP SERPL-CCNC: 64 U/L — SIGNIFICANT CHANGE UP (ref 40–120)
ALT FLD-CCNC: 14 U/L — SIGNIFICANT CHANGE UP (ref 10–45)
ANION GAP SERPL CALC-SCNC: 15 MMOL/L — SIGNIFICANT CHANGE UP (ref 5–17)
AST SERPL-CCNC: 28 U/L — SIGNIFICANT CHANGE UP (ref 10–40)
BASOPHILS # BLD AUTO: 0.05 K/UL — SIGNIFICANT CHANGE UP (ref 0–0.2)
BASOPHILS NFR BLD AUTO: 1 % — SIGNIFICANT CHANGE UP (ref 0–2)
BILIRUB SERPL-MCNC: 0.3 MG/DL — SIGNIFICANT CHANGE UP (ref 0.2–1.2)
BUN SERPL-MCNC: 31 MG/DL — HIGH (ref 7–23)
CALCIUM SERPL-MCNC: 9.5 MG/DL — SIGNIFICANT CHANGE UP (ref 8.4–10.5)
CHLORIDE SERPL-SCNC: 104 MMOL/L — SIGNIFICANT CHANGE UP (ref 96–108)
CO2 SERPL-SCNC: 20 MMOL/L — LOW (ref 22–31)
CREAT SERPL-MCNC: 2.86 MG/DL — HIGH (ref 0.5–1.3)
EOSINOPHIL # BLD AUTO: 0.25 K/UL — SIGNIFICANT CHANGE UP (ref 0–0.5)
EOSINOPHIL NFR BLD AUTO: 5 % — SIGNIFICANT CHANGE UP (ref 0–6)
GIANT PLATELETS BLD QL SMEAR: PRESENT — SIGNIFICANT CHANGE UP
GLUCOSE SERPL-MCNC: 66 MG/DL — LOW (ref 70–99)
HCT VFR BLD CALC: 35.2 % — SIGNIFICANT CHANGE UP (ref 34.5–45)
HGB BLD-MCNC: 10.8 G/DL — LOW (ref 11.5–15.5)
LG PLATELETS BLD QL AUTO: SLIGHT — SIGNIFICANT CHANGE UP
LYMPHOCYTES # BLD AUTO: 1.14 K/UL — SIGNIFICANT CHANGE UP (ref 1–3.3)
LYMPHOCYTES # BLD AUTO: 23 % — SIGNIFICANT CHANGE UP (ref 13–44)
MANUAL SMEAR VERIFICATION: SIGNIFICANT CHANGE UP
MCHC RBC-ENTMCNC: 28.7 PG — SIGNIFICANT CHANGE UP (ref 27–34)
MCHC RBC-ENTMCNC: 30.7 GM/DL — LOW (ref 32–36)
MCV RBC AUTO: 93.6 FL — SIGNIFICANT CHANGE UP (ref 80–100)
MONOCYTES # BLD AUTO: 1.34 K/UL — HIGH (ref 0–0.9)
MONOCYTES NFR BLD AUTO: 27 % — HIGH (ref 2–14)
NEUTROPHILS # BLD AUTO: 2.08 K/UL — SIGNIFICANT CHANGE UP (ref 1.8–7.4)
NEUTROPHILS NFR BLD AUTO: 38 % — LOW (ref 43–77)
NEUTS BAND # BLD: 4 % — SIGNIFICANT CHANGE UP (ref 0–8)
PLAT MORPH BLD: ABNORMAL
PLATELET # BLD AUTO: 220 K/UL — SIGNIFICANT CHANGE UP (ref 150–400)
POTASSIUM SERPL-MCNC: 4.4 MMOL/L — SIGNIFICANT CHANGE UP (ref 3.5–5.3)
POTASSIUM SERPL-SCNC: 4.4 MMOL/L — SIGNIFICANT CHANGE UP (ref 3.5–5.3)
PROT SERPL-MCNC: 7.3 G/DL — SIGNIFICANT CHANGE UP (ref 6–8.3)
RBC # BLD: 3.76 M/UL — LOW (ref 3.8–5.2)
RBC # FLD: 16 % — HIGH (ref 10.3–14.5)
RBC BLD AUTO: SIGNIFICANT CHANGE UP
SODIUM SERPL-SCNC: 139 MMOL/L — SIGNIFICANT CHANGE UP (ref 135–145)
VARIANT LYMPHS # BLD: 2 % — SIGNIFICANT CHANGE UP (ref 0–6)
WBC # BLD: 4.95 K/UL — SIGNIFICANT CHANGE UP (ref 3.8–10.5)
WBC # FLD AUTO: 4.95 K/UL — SIGNIFICANT CHANGE UP (ref 3.8–10.5)

## 2017-09-24 PROCEDURE — 99232 SBSQ HOSP IP/OBS MODERATE 35: CPT

## 2017-09-24 RX ORDER — FUROSEMIDE 40 MG
40 TABLET ORAL ONCE
Qty: 0 | Refills: 0 | Status: DISCONTINUED | OUTPATIENT
Start: 2017-09-24 | End: 2017-09-25

## 2017-09-24 RX ADMIN — Medication 3 MILLILITER(S): at 23:00

## 2017-09-24 RX ADMIN — Medication 650 MILLIGRAM(S): at 05:30

## 2017-09-24 RX ADMIN — HEPARIN SODIUM 5000 UNIT(S): 5000 INJECTION INTRAVENOUS; SUBCUTANEOUS at 05:30

## 2017-09-24 RX ADMIN — PANTOPRAZOLE SODIUM 40 MILLIGRAM(S): 20 TABLET, DELAYED RELEASE ORAL at 05:30

## 2017-09-24 RX ADMIN — Medication 325 MILLIGRAM(S): at 08:18

## 2017-09-24 RX ADMIN — Medication 25 MILLIGRAM(S): at 05:30

## 2017-09-24 RX ADMIN — Medication 100 MILLIGRAM(S): at 18:10

## 2017-09-24 RX ADMIN — Medication 25 MILLIGRAM(S): at 21:18

## 2017-09-24 RX ADMIN — Medication 100 MILLIGRAM(S): at 12:45

## 2017-09-24 RX ADMIN — SIMVASTATIN 20 MILLIGRAM(S): 20 TABLET, FILM COATED ORAL at 21:18

## 2017-09-24 RX ADMIN — Medication 50 MILLIGRAM(S): at 18:11

## 2017-09-24 RX ADMIN — Medication 30 MILLIGRAM(S): at 12:45

## 2017-09-24 RX ADMIN — Medication 100 MILLIGRAM(S): at 05:30

## 2017-09-24 RX ADMIN — MIRTAZAPINE 15 MILLIGRAM(S): 45 TABLET, ORALLY DISINTEGRATING ORAL at 21:18

## 2017-09-24 RX ADMIN — Medication 100 MILLIGRAM(S): at 23:00

## 2017-09-24 RX ADMIN — Medication 3 MILLILITER(S): at 18:10

## 2017-09-24 RX ADMIN — Medication 650 MILLIGRAM(S): at 13:05

## 2017-09-24 RX ADMIN — Medication 50 MILLIGRAM(S): at 05:30

## 2017-09-24 RX ADMIN — Medication 3 MILLILITER(S): at 12:45

## 2017-09-24 RX ADMIN — Medication 650 MILLIGRAM(S): at 21:18

## 2017-09-24 RX ADMIN — Medication 325 MILLIGRAM(S): at 18:11

## 2017-09-24 RX ADMIN — Medication 3 MILLILITER(S): at 05:30

## 2017-09-24 RX ADMIN — HEPARIN SODIUM 5000 UNIT(S): 5000 INJECTION INTRAVENOUS; SUBCUTANEOUS at 18:12

## 2017-09-24 NOTE — PROGRESS NOTE ADULT - PROBLEM SELECTOR PLAN 2
-due to NELL on CKD stage 4  -mildly elevated T waves compared to prior admission.  -s/p D5, insulin, Kayexalate  - Potasium 4.6 today - improved. hyperkalemia resolved - due to NELL on CKD stage 4  -s/p D5, insulin, Kayexalate

## 2017-09-24 NOTE — PROGRESS NOTE ADULT - SUBJECTIVE AND OBJECTIVE BOX
[facility]   [phone]  RENAL FOLLOW UP NOTE  --------------------------------------------------------------------------------  Chief Complaint:    24 hour events/subjective:  states feeling better and bit stronger. no HA       PAST HISTORY  --------------------------------------------------------------------------------  No significant changes to PMH, PSH, FHx, SHx, unless otherwise noted    ALLERGIES & MEDICATIONS  --------------------------------------------------------------------------------  Allergies    No Known Allergies    Intolerances      Standing Inpatient Medications  sodium bicarbonate 650 milliGRAM(s) Oral three times a day  simvastatin 20 milliGRAM(s) Oral at bedtime  metoprolol 50 milliGRAM(s) Oral two times a day  ferrous    sulfate 325 milliGRAM(s) Oral two times a day with meals  pantoprazole    Tablet 40 milliGRAM(s) Oral before breakfast  heparin  Injectable 5000 Unit(s) SubCutaneous every 12 hours  ALBUTerol/ipratropium for Nebulization 3 milliLiter(s) Nebulizer every 6 hours  oseltamivir 30 milliGRAM(s) Oral daily  guaiFENesin   Syrup  (Sugar-Free) 100 milliGRAM(s) Oral every 6 hours  mirtazapine 15 milliGRAM(s) Oral at bedtime  hydrALAZINE 25 milliGRAM(s) Oral every 8 hours  furosemide    Tablet 40 milliGRAM(s) Oral once    PRN Inpatient Medications  acetaminophen   Tablet 650 milliGRAM(s) Oral every 6 hours PRN      REVIEW OF SYSTEMS  --------------------------------------------------------------------------------    Gen: denies fevers/chills,  CVS: denies chest pain/palpitations  Resp: denies SOB/Cough  GI: Denies N/V/Abd pain or diarrhea  : Denies dysuria/  All other systems were reviewed and are negative, except as noted.    VITALS/PHYSICAL EXAM  --------------------------------------------------------------------------------  T(C): 36.8 (09-24-17 @ 04:20), Max: 37.2 (09-23-17 @ 13:38)  HR: 85 (09-24-17 @ 04:20) (79 - 94)  BP: 167/92 (09-24-17 @ 04:20) (152/82 - 208/110)  RR: 18 (09-24-17 @ 04:20) (16 - 18)  SpO2: 95% (09-24-17 @ 04:20) (95% - 98%)  Wt(kg): --        09-23-17 @ 07:01  -  09-24-17 @ 07:00  --------------------------------------------------------  IN: 340 mL / OUT: 2076 mL / NET: -1736 mL        Physical Exam:  	Gen: awake and responsive. comfortable appearing  	Pulm: Decreased breath sounds b/l bases. no rales or ronchi mild exp wheeze  	CV: RRR, S1/S2. no rub  	Abd: +BS, soft, nontender/nondistended  	: No suprapubic tenderness.               Extremity: No cyanosis, no edema no clubbing  	    LABS/STUDIES  --------------------------------------------------------------------------------              10.8   4.95  >-----------<  220      [09-24-17 @ 08:55]              35.2     139  |  104  |  31  ----------------------------<  66      [09-24-17 @ 08:50]  4.4   |  20  |  2.86        Ca     9.5     [09-24-17 @ 08:50]    TPro  7.3  /  Alb  2.9  /  TBili  0.3  /  DBili  x   /  AST  28  /  ALT  14  /  AlkPhos  64  [09-24-17 @ 08:50]          Creatinine Trend:  SCr 2.86 [09-24 @ 08:50]  SCr 2.89 [09-23 @ 08:32]  SCr 2.93 [09-22 @ 16:14]  SCr 3.00 [09-22 @ 08:02]  SCr 3.15 [09-21 @ 21:52]              Urinalysis - [09-21-17 @ 23:02]      Color  / Appearance  / SG 1.007 / pH 7.0      Gluc Negative / Ketone Negative  / Bili Negative / Urobili Negative       Blood Large / Protein 30 / Leuk Est Large / Nitrite Negative      RBC 10-25 / WBC >50 / Hyaline  / Gran  / Sq Epi  / Non Sq Epi OCC / Bacteria Few      Iron 18, TIBC 173, %sat 10      [07-30-17 @ 12:34]  Ferritin 842.0      [07-30-17 @ 15:24]  PTH -- (Ca 8.9)      [05-05-17 @ 07:21]   32  Vitamin D (25OH) 116.0      [07-30-17 @ 15:24]  HbA1c 5.2      [07-30-17 @ 15:24]  TSH 5.58      [08-11-17 @ 07:12]  Lipid: chol 149, TG 65, HDL 49, LDL 87      [07-30-17 @ 12:34]

## 2017-09-24 NOTE — PROGRESS NOTE ADULT - PROBLEM SELECTOR PLAN 6
continue with chronic roque - urine appears mostly clear. UA likely represents colonization and not infection.   - If the fevers do not resolve, consider treating with antibiotics for possible cathter associated UTI. continue with chronic roque - urine appears mostly clear. UA likely represents colonization and not infection.   - If the fevers recur, consider treating with antibiotics for possible cathter associated UTI.

## 2017-09-24 NOTE — PROGRESS NOTE ADULT - PROBLEM SELECTOR PLAN 4
continue metoprolol BID HTN uncontrolled yesterday - possibly due to salt/water retention 2/2 CKD and IV hydration earlier. IV fluids were stopped. Hydralazine 25mg PO TID was initiated yesterday.   Per discussion with Dr. Pinedo (renal) today - will give Lasix 40mg PO x1 dose.  - continue metoprolol BID  - If BP normalizes, may discontinue the hydralazine

## 2017-09-24 NOTE — PROGRESS NOTE ADULT - PROBLEM SELECTOR PLAN 3
NELL on CKD IV likely due to dehydration (decreased PO intake)  -S/p IV hydration.   -Monitor Creatinine daily  - Nephrology following daily. NELL on CKD IV likely due to dehydration (decreased PO intake)  -S/p IV hydration.   -Monitor Creatinine daily  -Nephrology following daily.

## 2017-09-24 NOTE — PROGRESS NOTE ADULT - SUBJECTIVE AND OBJECTIVE BOX
Patient is a 82y old  Female who presents with a chief complaint of Fever and decreased PO (21 Sep 2017 21:31)      SUBJECTIVE / OVERNIGHT EVENTS: No events overnight. Still having a mild cough. Afebrile now for >24 hours. Awaiting PT evaluation.     MEDICATIONS  (STANDING):  sodium bicarbonate 650 milliGRAM(s) Oral three times a day  simvastatin 20 milliGRAM(s) Oral at bedtime  metoprolol 50 milliGRAM(s) Oral two times a day  ferrous    sulfate 325 milliGRAM(s) Oral two times a day with meals  pantoprazole    Tablet 40 milliGRAM(s) Oral before breakfast  heparin  Injectable 5000 Unit(s) SubCutaneous every 12 hours  ALBUTerol/ipratropium for Nebulization 3 milliLiter(s) Nebulizer every 6 hours  oseltamivir 30 milliGRAM(s) Oral daily  guaiFENesin   Syrup  (Sugar-Free) 100 milliGRAM(s) Oral every 6 hours  mirtazapine 15 milliGRAM(s) Oral at bedtime  hydrALAZINE 25 milliGRAM(s) Oral every 8 hours  furosemide    Tablet 40 milliGRAM(s) Oral once    MEDICATIONS  (PRN):  acetaminophen   Tablet 650 milliGRAM(s) Oral every 6 hours PRN For Temp greater than 38 C (100.4 F)      Vital Signs Last 24 Hrs  T(C): 36.8 (24 Sep 2017 04:20), Max: 37.2 (23 Sep 2017 13:38)  T(F): 98.2 (24 Sep 2017 04:20), Max: 99 (23 Sep 2017 13:38)  HR: 85 (24 Sep 2017 04:20) (79 - 94)  BP: 167/92 (24 Sep 2017 04:20) (152/82 - 208/110)  BP(mean): --  RR: 18 (24 Sep 2017 04:20) (16 - 18)  SpO2: 95% (24 Sep 2017 04:20) (95% - 98%)  CAPILLARY BLOOD GLUCOSE        I&O's Summary    23 Sep 2017 07:01  -  24 Sep 2017 07:00  --------------------------------------------------------  IN: 340 mL / OUT: 2076 mL / NET: -1736 mL        PHYSICAL EXAM:  GENERAL: NAD, well-developed  HEAD:  Atraumatic, Normocephalic  EYES: EOMI, PERRLA, conjunctiva and sclera clear  CHEST/LUNG: bilateral expiratory wheezing still present today  HEART: Regular rate and rhythm; No murmurs, rubs, or gallops  ABDOMEN: Soft, Nontender, Nondistended; Bowel sounds present  EXTREMITIES:  No clubbing, cyanosis, or edema  PSYCH: AAOx3  NEUROLOGY: non-focal  SKIN: No rashes or lesions      LABS:                        10.8   4.95  )-----------( 220      ( 24 Sep 2017 08:55 )             35.2     09-24    139  |  104  |  31<H>  ----------------------------<  66<L>  4.4   |  20<L>  |  2.86<H>    Ca    9.5      24 Sep 2017 08:50    TPro  7.3  /  Alb  2.9<L>  /  TBili  0.3  /  DBili  x   /  AST  28  /  ALT  14  /  AlkPhos  64  09-24      RADIOLOGY & ADDITIONAL TESTS:    Consultant(s) Notes Reviewed: Renal    Care Discussed with Consultants/Other Providers:  Nephrology regarding HTN management Patient is a 82y old  Female who presents with a chief complaint of Fever and decreased PO (21 Sep 2017 21:31)      SUBJECTIVE / OVERNIGHT EVENTS: No events overnight. Still having a mild cough. Afebrile now for >24 hours. Awaiting PT evaluation.     MEDICATIONS  (STANDING):  sodium bicarbonate 650 milliGRAM(s) Oral three times a day  simvastatin 20 milliGRAM(s) Oral at bedtime  metoprolol 50 milliGRAM(s) Oral two times a day  ferrous    sulfate 325 milliGRAM(s) Oral two times a day with meals  pantoprazole    Tablet 40 milliGRAM(s) Oral before breakfast  heparin  Injectable 5000 Unit(s) SubCutaneous every 12 hours  ALBUTerol/ipratropium for Nebulization 3 milliLiter(s) Nebulizer every 6 hours  oseltamivir 30 milliGRAM(s) Oral daily  guaiFENesin   Syrup  (Sugar-Free) 100 milliGRAM(s) Oral every 6 hours  mirtazapine 15 milliGRAM(s) Oral at bedtime  hydrALAZINE 25 milliGRAM(s) Oral every 8 hours  furosemide    Tablet 40 milliGRAM(s) Oral once    MEDICATIONS  (PRN):  acetaminophen   Tablet 650 milliGRAM(s) Oral every 6 hours PRN For Temp greater than 38 C (100.4 F)      Vital Signs Last 24 Hrs  T(C): 36.8 (24 Sep 2017 04:20), Max: 37.2 (23 Sep 2017 13:38)  T(F): 98.2 (24 Sep 2017 04:20), Max: 99 (23 Sep 2017 13:38)  HR: 85 (24 Sep 2017 04:20) (79 - 94)  BP: 167/92 (24 Sep 2017 04:20) (152/82 - 208/110)  BP(mean): --  RR: 18 (24 Sep 2017 04:20) (16 - 18)  SpO2: 95% (24 Sep 2017 04:20) (95% - 98%)    I&O's Summary    23 Sep 2017 07:01  -  24 Sep 2017 07:00  --------------------------------------------------------  IN: 340 mL / OUT: 2076 mL / NET: -1736 mL    PHYSICAL EXAM:  GENERAL: NAD, well-developed  HEAD:  Atraumatic, Normocephalic  EYES: EOMI, PERRLA, conjunctiva and sclera clear  CHEST/LUNG: bilateral expiratory wheezing still present today  HEART: Regular rate and rhythm; No murmurs, rubs, or gallops  ABDOMEN: Soft, Nontender, Nondistended; Bowel sounds present  EXTREMITIES:  No clubbing, cyanosis, or edema  PSYCH: AAOx3  NEUROLOGY: non-focal  SKIN: No rashes or lesions      LABS:                        10.8   4.95  )-----------( 220      ( 24 Sep 2017 08:55 )             35.2     09-24    139  |  104  |  31<H>  ----------------------------<  66<L>  4.4   |  20<L>  |  2.86<H>    Ca    9.5      24 Sep 2017 08:50    TPro  7.3  /  Alb  2.9<L>  /  TBili  0.3  /  DBili  x   /  AST  28  /  ALT  14  /  AlkPhos  64  09-24      RADIOLOGY & ADDITIONAL TESTS:    Consultant(s) Notes Reviewed: Renal    Care Discussed with Consultants/Other Providers:  Nephrology regarding HTN management

## 2017-09-24 NOTE — PROGRESS NOTE ADULT - PROBLEM SELECTOR PLAN 1
-patient with fever, tachycardia, lactate 2.1, officially qualified as severe sepsis rowan to influenza A infection  -CXR clear, but has diffuse expiratory wheeze on exam.   -continue with Tamiflu 30mg PO daily (renal dosing)  -Monitor ENMA - maintain MAP>65  - Continue Duonebs INH q6h standing - if wheezing worsens may need to initiate steroids. -patient with fever, tachycardia, lactate 2.1, officially qualified as severe sepsis rowan to influenza A infection. Fevers finally resolved.   -CXR clear, but has diffuse expiratory wheeze on exam (maybe a little better today)  -continue with Tamiflu 30mg PO daily (renal dosing)  - Maintain MAP>65  - Continue Duonebs INH q6h standing - if wheezing worsens may need to initiate steroids.

## 2017-09-25 ENCOUNTER — TRANSCRIPTION ENCOUNTER (OUTPATIENT)
Age: 82
End: 2017-09-25

## 2017-09-25 LAB
ANION GAP SERPL CALC-SCNC: 16 MMOL/L — SIGNIFICANT CHANGE UP (ref 5–17)
BUN SERPL-MCNC: 35 MG/DL — HIGH (ref 7–23)
CALCIUM SERPL-MCNC: 9.7 MG/DL — SIGNIFICANT CHANGE UP (ref 8.4–10.5)
CHLORIDE SERPL-SCNC: 101 MMOL/L — SIGNIFICANT CHANGE UP (ref 96–108)
CO2 SERPL-SCNC: 22 MMOL/L — SIGNIFICANT CHANGE UP (ref 22–31)
CREAT SERPL-MCNC: 2.96 MG/DL — HIGH (ref 0.5–1.3)
GLUCOSE SERPL-MCNC: 83 MG/DL — SIGNIFICANT CHANGE UP (ref 70–99)
HCT VFR BLD CALC: 38.1 % — SIGNIFICANT CHANGE UP (ref 34.5–45)
HGB BLD-MCNC: 11.5 G/DL — SIGNIFICANT CHANGE UP (ref 11.5–15.5)
MCHC RBC-ENTMCNC: 28.1 PG — SIGNIFICANT CHANGE UP (ref 27–34)
MCHC RBC-ENTMCNC: 30.2 GM/DL — LOW (ref 32–36)
MCV RBC AUTO: 93.2 FL — SIGNIFICANT CHANGE UP (ref 80–100)
PLATELET # BLD AUTO: 240 K/UL — SIGNIFICANT CHANGE UP (ref 150–400)
POTASSIUM SERPL-MCNC: 4.5 MMOL/L — SIGNIFICANT CHANGE UP (ref 3.5–5.3)
POTASSIUM SERPL-SCNC: 4.5 MMOL/L — SIGNIFICANT CHANGE UP (ref 3.5–5.3)
RBC # BLD: 4.09 M/UL — SIGNIFICANT CHANGE UP (ref 3.8–5.2)
RBC # FLD: 16 % — HIGH (ref 10.3–14.5)
SODIUM SERPL-SCNC: 139 MMOL/L — SIGNIFICANT CHANGE UP (ref 135–145)
WBC # BLD: 5.63 K/UL — SIGNIFICANT CHANGE UP (ref 3.8–10.5)
WBC # FLD AUTO: 5.63 K/UL — SIGNIFICANT CHANGE UP (ref 3.8–10.5)

## 2017-09-25 PROCEDURE — 99233 SBSQ HOSP IP/OBS HIGH 50: CPT

## 2017-09-25 RX ORDER — PANTOPRAZOLE SODIUM 20 MG/1
0 TABLET, DELAYED RELEASE ORAL
Qty: 0 | Refills: 0 | COMMUNITY

## 2017-09-25 RX ORDER — MIRTAZAPINE 45 MG/1
1 TABLET, ORALLY DISINTEGRATING ORAL
Qty: 0 | Refills: 0 | COMMUNITY
Start: 2017-09-25

## 2017-09-25 RX ORDER — HYDRALAZINE HCL 50 MG
1 TABLET ORAL
Qty: 90 | Refills: 0 | OUTPATIENT
Start: 2017-09-25 | End: 2017-10-25

## 2017-09-25 RX ORDER — PANTOPRAZOLE SODIUM 20 MG/1
1 TABLET, DELAYED RELEASE ORAL
Qty: 0 | Refills: 0 | COMMUNITY

## 2017-09-25 RX ADMIN — Medication 650 MILLIGRAM(S): at 05:02

## 2017-09-25 RX ADMIN — HEPARIN SODIUM 5000 UNIT(S): 5000 INJECTION INTRAVENOUS; SUBCUTANEOUS at 17:43

## 2017-09-25 RX ADMIN — Medication 3 MILLILITER(S): at 17:43

## 2017-09-25 RX ADMIN — Medication 25 MILLIGRAM(S): at 05:03

## 2017-09-25 RX ADMIN — MIRTAZAPINE 15 MILLIGRAM(S): 45 TABLET, ORALLY DISINTEGRATING ORAL at 21:14

## 2017-09-25 RX ADMIN — PANTOPRAZOLE SODIUM 40 MILLIGRAM(S): 20 TABLET, DELAYED RELEASE ORAL at 05:02

## 2017-09-25 RX ADMIN — Medication 30 MILLIGRAM(S): at 12:19

## 2017-09-25 RX ADMIN — Medication 25 MILLIGRAM(S): at 21:14

## 2017-09-25 RX ADMIN — Medication 650 MILLIGRAM(S): at 21:14

## 2017-09-25 RX ADMIN — Medication 50 MILLIGRAM(S): at 05:02

## 2017-09-25 RX ADMIN — Medication 3 MILLILITER(S): at 05:03

## 2017-09-25 RX ADMIN — Medication 100 MILLIGRAM(S): at 05:02

## 2017-09-25 RX ADMIN — HEPARIN SODIUM 5000 UNIT(S): 5000 INJECTION INTRAVENOUS; SUBCUTANEOUS at 05:02

## 2017-09-25 RX ADMIN — SIMVASTATIN 20 MILLIGRAM(S): 20 TABLET, FILM COATED ORAL at 21:14

## 2017-09-25 RX ADMIN — Medication 325 MILLIGRAM(S): at 17:43

## 2017-09-25 RX ADMIN — Medication 650 MILLIGRAM(S): at 12:19

## 2017-09-25 RX ADMIN — Medication 50 MILLIGRAM(S): at 17:43

## 2017-09-25 RX ADMIN — Medication 25 MILLIGRAM(S): at 12:45

## 2017-09-25 RX ADMIN — Medication 325 MILLIGRAM(S): at 12:19

## 2017-09-25 NOTE — PROVIDER CONTACT NOTE (OTHER) - ASSESSMENT
Pt asymptomatic  GT=078/110 manual  HR=94/ NSR  R=18
VSS, no s/s distress. Pt states she always has high BP
stable: no current fever

## 2017-09-25 NOTE — PROGRESS NOTE ADULT - PROBLEM SELECTOR PLAN 3
NELL on CKD IV likely due to dehydration (decreased PO intake)  -S/p IV hydration.   -Monitor Creatinine daily  -Nephrology following daily.

## 2017-09-25 NOTE — DISCHARGE NOTE ADULT - MEDICATION SUMMARY - MEDICATIONS TO STOP TAKING
I will STOP taking the medications listed below when I get home from the hospital:    Vitamin D3 2000 intl units oral capsule  -- 1 cap(s) by mouth once a day    traMADol 50 mg oral tablet  -- 0.5 tab(s) by mouth every 8 hours, As needed, Moderate Pain (4 - 6)    haloperidol  --    risperiDONE 0.25 mg oral tablet  -- 1 tab(s) by mouth every 6 hours    Cipro 250 mg oral tablet  -- 1 tab(s) by mouth 2 times a day  -- Avoid prolonged or excessive exposure to direct and/or artificial sunlight while taking this medication.  Check with your doctor before becoming pregnant.  Do not take dairy products, antacids, or iron preparations within one hour of this medication.  Finish all this medication unless otherwise directed by prescriber.  Medication should be taken with plenty of water.    Vitamin D3 2000 intl units oral tablet  -- 1 tab(s) by mouth once a day I will STOP taking the medications listed below when I get home from the hospital:    Vitamin D3 2000 intl units oral capsule  -- 1 cap(s) by mouth once a day    traMADol 50 mg oral tablet  -- 0.5 tab(s) by mouth every 8 hours, As needed, Moderate Pain (4 - 6)    haloperidol  --    risperiDONE 0.25 mg oral tablet  -- 1 tab(s) by mouth every 6 hours    Cipro 250 mg oral tablet  -- 1 tab(s) by mouth 2 times a day  -- Avoid prolonged or excessive exposure to direct and/or artificial sunlight while taking this medication.  Check with your doctor before becoming pregnant.  Do not take dairy products, antacids, or iron preparations within one hour of this medication.  Finish all this medication unless otherwise directed by prescriber.  Medication should be taken with plenty of water.    Vitamin D3 2000 intl units oral tablet  -- 1 tab(s) by mouth once a day    Myrbetriq  -- 20 milligram(s) by mouth once a day

## 2017-09-25 NOTE — PROGRESS NOTE ADULT - PROBLEM SELECTOR PLAN 6
continue with chronic roque - urine appears mostly clear. UA likely represents colonization and not infection.   - If the fevers recur, consider treating with antibiotics for possible cathter associated UTI.

## 2017-09-25 NOTE — DISCHARGE NOTE ADULT - MEDICATION SUMMARY - MEDICATIONS TO CHANGE
I will SWITCH the dose or number of times a day I take the medications listed below when I get home from the hospital:    mirtazapine 30 mg oral tablet  -- 1 tab(s) by mouth once a day (at bedtime)    Tylenol 325 mg oral tablet  -- 2 tab(s) by mouth every 4 hours

## 2017-09-25 NOTE — PHYSICAL THERAPY INITIAL EVALUATION ADULT - ADDITIONAL COMMENTS
Pt lives with son and , receives HA for 6 hours M-F,  receives 4 hours due to peripheral neuropathy amb w/ rollator/cane, HA from 8:30-5:30 on Saturdays. Per pt, she amb independently w/o assistive device, per son, pt needs supervision for safety, uses RW at home, h/o falls, h/o Rehab but felt cognitive status deteriorated after rehab.  Pt has 7 steps to enter, has back access 3 steps, has chair lift to 2nd floor

## 2017-09-25 NOTE — DISCHARGE NOTE ADULT - CARE PROVIDER_API CALL
Solitario Cesar (DO), Internal Medicine  [address] [place] [address]  Phone: [phone]  Fax: [phone]

## 2017-09-25 NOTE — DISCHARGE NOTE ADULT - HOSPITAL COURSE
· Assessment		  83 yo female PMHx CKD IV, neurogenic bladder with chronic rouqe, dementia admitted with sepsis 2/2 influenza infection AH3, c/b NELL on CKD stage 4.     Problem/Plan - 1:  ·  Problem: Sepsis, due to unspecified organism.  Plan: -resolved, afebrile for 48 horus.  -today day 5/5 of tamiflu  -no wheezing today on exam  -ambulate off O2 and monitor saturations. If does not desaturate to 88%, can D/C  -continue with duonebs prn for wheeze.      Problem/Plan - 2:  ·  Problem: Hyperkalemia.  Plan: hyperkalemia resolved - due to NELL on CKD stage 4  -s/p D5, insulin, Kayexalate.      Problem/Plan - 3:  ·  Problem: CKD (chronic kidney disease) stage 4, GFR 15-29 ml/min.  Plan: NELL on CKD IV likely due to dehydration (decreased PO intake)  -S/p IV hydration.   -Monitor Creatinine daily  -Nephrology following daily.      Problem/Plan - 4:  ·  Problem: Essential hypertension.  Plan: -still above goal of SBP<150  -continue with hydralazine, continue to monitor.      Problem/Plan - 5:  ·  Problem: Dementia with behavioral disturbance, unspecified dementia type.  Plan: off Risperdal from prior admission.  -Restarted Remeron 15mg.      Problem/Plan - 6:  Problem: Neurogenic bladder disorder. Plan: continue with chronic roque - urine appears mostly clear. UA likely represents colonization and not infection.   - If the fevers recur, consider treating with antibiotics for possible cathter associated UTI.     Problem/Plan - 7:  ·  Problem: Need for prophylactic measure.  Plan: HSQ.  -awaiting  to arrive to discuss further GOC · Assessment		  83 yo female PMHx CKD IV, neurogenic bladder with chronic roque, dementia admitted with sepsis 2/2 influenza infection AH3, c/b NELL on CKD stage 4.  Patient admitted to hospital and monitored closely over the weekend.  Patient remained afebrile over weekend and for over 72 hrs prior to discharge.  Patient receieved 5 days of renally-dosed tamiflu.  Creatinine returned to basleine.  Patient with HTN during hospital course, added hydralazine which improved blood pressure.  Patient ambulated off of oxygen at 95%.  Physical therapy recommended rehab for debility, family declined, will go [facility] current home care.  Patient stable for discharge with close follow up with Dr. Pinedo in 1 week.  Patient to yeimy [facility]sales for post-viral bronchospasms.

## 2017-09-25 NOTE — PROGRESS NOTE ADULT - SUBJECTIVE AND OBJECTIVE BOX
[facility]   [phone]  RENAL FOLLOW UP NOTE  --------------------------------------------------------------------------------  Chief Complaint:    24 hour events/subjective:    states coughing is better and feels better.     PAST HISTORY  --------------------------------------------------------------------------------  No significant changes to PMH, PSH, FHx, SHx, unless otherwise noted    ALLERGIES & MEDICATIONS  --------------------------------------------------------------------------------  Allergies    No Known Allergies    Intolerances      Standing Inpatient Medications  sodium bicarbonate 650 milliGRAM(s) Oral three times a day  simvastatin 20 milliGRAM(s) Oral at bedtime  metoprolol 50 milliGRAM(s) Oral two times a day  ferrous    sulfate 325 milliGRAM(s) Oral two times a day with meals  pantoprazole    Tablet 40 milliGRAM(s) Oral before breakfast  heparin  Injectable 5000 Unit(s) SubCutaneous every 12 hours  ALBUTerol/ipratropium for Nebulization 3 milliLiter(s) Nebulizer every 6 hours  oseltamivir 30 milliGRAM(s) Oral daily  guaiFENesin   Syrup  (Sugar-Free) 100 milliGRAM(s) Oral every 6 hours  mirtazapine 15 milliGRAM(s) Oral at bedtime  hydrALAZINE 25 milliGRAM(s) Oral every 8 hours  furosemide    Tablet 40 milliGRAM(s) Oral once    PRN Inpatient Medications  acetaminophen   Tablet 650 milliGRAM(s) Oral every 6 hours PRN      REVIEW OF SYSTEMS  --------------------------------------------------------------------------------    Gen: denies fatigue, fevers/chills,  CVS: denies chest pain/palpitations  Resp: denies SOB/Cough ocassional   GI: Denies N/V/Abd pain  : Denies dysuria/oliguria/hematuria    All other systems were reviewed and are negative, except as noted.    VITALS/PHYSICAL EXAM  --------------------------------------------------------------------------------  T(C): 36.6 (09-25-17 @ 15:03), Max: 36.8 (09-24-17 @ 21:04)  HR: 97 (09-25-17 @ 17:35) (69 - 97)  BP: 151/88 (09-25-17 @ 17:35) (118/72 - 181/93)  RR: 16 (09-25-17 @ 15:03) (16 - 18)  SpO2: 95% (09-25-17 @ 15:03) (95% - 97%)  Wt(kg): --        09-24-17 @ 07:01  -  09-25-17 @ 07:00  --------------------------------------------------------  IN: 1130 mL / OUT: 750 mL / NET: 380 mL    09-25-17 @ 07:01  -  09-25-17 @ 20:08  --------------------------------------------------------  IN: 720 mL / OUT: 900 mL / NET: -180 mL          Physical Exam:  	Gen: communicative but has dementia  	Pulm: Decreased breath sounds b/l bases. no rales or ronchi ocassional  wheezing  	CV: RRR, S1/S2. no rub  	Back: No CVA tenderness; no sacral edema  	Abd: +BS, soft, nontender/nondistended  	: No suprapubic tenderness.               Extremity: No cyanosis, no edema no clubbing  	LABS/STUDIES  --------------------------------------------------------------------------------              11.5   5.63  >-----------<  240      [09-25-17 @ 07:27]              38.1     139  |  101  |  35  ----------------------------<  83      [09-25-17 @ 07:31]  4.5   |  22  |  2.96        Ca     9.7     [09-25-17 @ 07:31]    TPro  7.3  /  Alb  2.9  /  TBili  0.3  /  DBili  x   /  AST  28  /  ALT  14  /  AlkPhos  64  [09-24-17 @ 08:50]          Creatinine Trend:  SCr 2.96 [09-25 @ 07:31]  SCr 2.86 [09-24 @ 08:50]  SCr 2.89 [09-23 @ 08:32]  SCr 2.93 [09-22 @ 16:14]  SCr 3.00 [09-22 @ 08:02]              Urinalysis - [09-21-17 @ 23:02]      Color  / Appearance  / SG 1.007 / pH 7.0      Gluc Negative / Ketone Negative  / Bili Negative / Urobili Negative       Blood Large / Protein 30 / Leuk Est Large / Nitrite Negative      RBC 10-25 / WBC >50 / Hyaline  / Gran  / Sq Epi  / Non Sq Epi OCC / Bacteria Few      Iron 18, TIBC 173, %sat 10      [07-30-17 @ 12:34]  Ferritin 842.0      [07-30-17 @ 15:24]  PTH -- (Ca 8.9)      [05-05-17 @ 07:21]   32  Vitamin D (25OH) 116.0      [07-30-17 @ 15:24]  HbA1c 5.2      [07-30-17 @ 15:24]  TSH 5.58      [08-11-17 @ 07:12]  Lipid: chol 149, TG 65, HDL 49, LDL 87      [07-30-17 @ 12:34]

## 2017-09-25 NOTE — PHYSICAL THERAPY INITIAL EVALUATION ADULT - DIAGNOSIS, PT EVAL
decreased funcion decreased functional mobility related to generalized weakness, decreased balance and endurance

## 2017-09-25 NOTE — DISCHARGE NOTE ADULT - NSFTFSERV1RD_GEN_ALL_CORE
catheter insertion/medication teaching and assessment/observation and assessment/rehabilitation services

## 2017-09-25 NOTE — DISCHARGE NOTE ADULT - MEDICATION SUMMARY - MEDICATIONS TO TAKE
I will START or STAY ON the medications listed below when I get home from the hospital:    Tylenol 325 mg oral tablet  -- 2 tab(s) by mouth every 6 hours, As Needed  pain  -- Indication: For Pain management    sodium bicarbonate 650 mg oral tablet  -- 1 tab(s) by mouth 3 times a day  -- Indication: For Chronic kidney disease    mirtazapine 15 mg oral tablet  -- 1 tab(s) by mouth once a day (at bedtime)  -- Indication: For Dementia with behavioral disturbance, unspecified dementia type    simvastatin 20 mg oral tablet  -- 1 tab(s) by mouth once a day (at bedtime)  -- Indication: For Hyperlipidemia    metoprolol tartrate 50 mg oral tablet  -- 1 tab(s) by mouth 2 times a day  -- Indication: For HTN (hypertension)    epoetin marco  -- 47337 unit(s) subcutaneous once a week  -- Indication: For Chronic kidney disease & anemia    ferrous sulfate 325 mg (65 mg elemental iron) oral tablet  -- 1 tab(s) by mouth 2 times a day  -- Indication: For Iron supplement    pantoprazole 40 mg oral delayed release tablet  -- 1 tab(s) by mouth once a day 1/2 hour before breakfast  -- Indication: For Stomach protection    Myrbetriq  -- 20 milligram(s) by mouth once a day  -- Indication: For bladder muscle dysfunction    hydrALAZINE 25 mg oral tablet  -- 1 tab(s) by mouth every 8 hours  -- Indication: For HTN (hypertension) I will START or STAY ON the medications listed below when I get home from the hospital:    Tylenol 325 mg oral tablet  -- 2 tab(s) by mouth every 6 hours, As Needed  pain  -- Indication: For Pain management    sodium bicarbonate 650 mg oral tablet  -- 1 tab(s) by mouth 3 times a day  -- Indication: For Chronic kidney disease    mirtazapine 15 mg oral tablet  -- 1 tab(s) by mouth once a day (at bedtime)  -- Indication: For Dementia with behavioral disturbance, unspecified dementia type    simvastatin 20 mg oral tablet  -- 1 tab(s) by mouth once a day (at bedtime)  -- Indication: For Hyperlipidemia    metoprolol tartrate 50 mg oral tablet  -- 1 tab(s) by mouth 2 times a day  -- Indication: For HTN (hypertension)    ProAir HFA 90 mcg/inh inhalation aerosol  -- 2 puff(s) inhaled 4 times a day, As Needed -for shortness of breath and/or wheezing   -- For inhalation only.  It is very important that you take or use this exactly as directed.  Do not skip doses or discontinue unless directed by your doctor.  Obtain medical advice before taking any non-prescription drugs as some may affect the action of this medication.  Shake well before use.    -- Indication: For S/p flu - for wheezing if needed    epoetin marco  -- 40705 unit(s) subcutaneous once a week  -- Indication: For Chronic kidney disease & anemia    ferrous sulfate 325 mg (65 mg elemental iron) oral tablet  -- 1 tab(s) by mouth 2 times a day  -- Indication: For Iron supplement    pantoprazole 40 mg oral delayed release tablet  -- 1 tab(s) by mouth once a day 1/2 hour before breakfast  -- Indication: For Stomach protection    hydrALAZINE 25 mg oral tablet  -- 1 tab(s) by mouth every 8 hours  -- Indication: For HTN (hypertension)

## 2017-09-25 NOTE — DISCHARGE NOTE ADULT - PLAN OF CARE
finished flu treatment follow up with primary care physician within one week after discharge take Remeron as directed  call primary care physician about restarting Risperdal Avoid taking (NSAIDs) - (ex: Ibuprofen, Advil, Celebrex, Naprosyn)  Avoid taking any nephrotoxic agents (can harm kidneys) - Intravenous contrast for diagnostic testing, combination cold medications.  Have all medications adjusted for your renal function by your Health Care Provider.  Blood pressure control is important.  Take all medication as prescribed  adequate hydration is essential. Roque catheter needs to be changed monthly - check last roque change  routine roque care for infection control Follow up with your medical doctor to establish long term blood pressure treatment goals.  Hydralazine is new for hypertension control follow up with primary care physician within one week after discharge  Proventil inhaler four times daily as directed for wheezing &/or shortness of breath Avoid taking (NSAIDs) - (ex: Ibuprofen, Advil, Celebrex, Naprosyn)  Avoid taking any nephrotoxic agents (can harm kidneys) - Intravenous contrast for diagnostic testing, combination cold medications.  -please continue to maintain good hydration.  If you start not to eat well, please go to your physician to ensure your kidney function is not worsening.   Have all medications adjusted for your renal function by your Health Care Provider.  Blood pressure control is important.  Take all medication as prescribed  adequate hydration is essential.

## 2017-09-25 NOTE — DISCHARGE NOTE ADULT - PATIENT PORTAL LINK FT
“You can access the Follow[facility] Patient Portal, offered by [facility], by registering with the following website: [email or web address]”

## 2017-09-25 NOTE — PROGRESS NOTE ADULT - SUBJECTIVE AND OBJECTIVE BOX
Patient is a 82y old  Female who presents with a chief complaint of Fever and decreased PO (21 Sep 2017 21:31)      SUBJECTIVE / OVERNIGHT EVENTS: No acute events overnight.  Patient states has no complaints. Denies fever, chills, chest pain, SOB, abdominal pain, n/v/d/c, dysuria.    MEDICATIONS  (STANDING):  sodium bicarbonate 650 milliGRAM(s) Oral three times a day  simvastatin 20 milliGRAM(s) Oral at bedtime  metoprolol 50 milliGRAM(s) Oral two times a day  ferrous    sulfate 325 milliGRAM(s) Oral two times a day with meals  pantoprazole    Tablet 40 milliGRAM(s) Oral before breakfast  heparin  Injectable 5000 Unit(s) SubCutaneous every 12 hours  ALBUTerol/ipratropium for Nebulization 3 milliLiter(s) Nebulizer every 6 hours  oseltamivir 30 milliGRAM(s) Oral daily  guaiFENesin   Syrup  (Sugar-Free) 100 milliGRAM(s) Oral every 6 hours  mirtazapine 15 milliGRAM(s) Oral at bedtime  hydrALAZINE 25 milliGRAM(s) Oral every 8 hours  furosemide    Tablet 40 milliGRAM(s) Oral once    MEDICATIONS  (PRN):  acetaminophen   Tablet 650 milliGRAM(s) Oral every 6 hours PRN For Temp greater than 38 C (100.4 F)      Vital Signs Last 24 Hrs  T(C): 36.8 (25 Sep 2017 04:30), Max: 36.9 (24 Sep 2017 13:20)  T(F): 98.2 (25 Sep 2017 04:30), Max: 98.4 (24 Sep 2017 13:20)  HR: 89 (25 Sep 2017 12:16) (69 - 95)  BP: 167/91 (25 Sep 2017 12:16) (133/82 - 181/93)  BP(mean): --  RR: 16 (25 Sep 2017 12:16) (16 - 18)  SpO2: 95% (25 Sep 2017 12:16) (95% - 97%)  I&O's Summary  I&O's Summary    24 Sep 2017 07:01  -  25 Sep 2017 07:00  --------------------------------------------------------  IN: 1130 mL / OUT: 750 mL / NET: 380 mL    25 Sep 2017 07:01  -  25 Sep 2017 12:37  --------------------------------------------------------  IN: 240 mL / OUT: 0 mL / NET: 240 mL        PHYSICAL EXAM:  GENERAL: NAD, well-developed  HEAD:  Atraumatic, Normocephalic  EYES: EOMI, PERRLA, conjunctiva and sclera clear  CHEST/LUNG: bilateral no wheeze today.    HEART: Regular rate and rhythm; No murmurs, rubs, or gallops  ABDOMEN: Soft, Nontender, Nondistended; Bowel sounds present  EXTREMITIES:  No clubbing, cyanosis, or edema  PSYCH: AAOx3  NEUROLOGY: non-focal  SKIN: No rashes or lesions      LABS:                                   11.5   5.63  )-----------( 240      ( 25 Sep 2017 07:27 )             38.1   09-25    139  |  101  |  35<H>  ----------------------------<  83  4.5   |  22  |  2.96<H>    Ca    9.7      25 Sep 2017 07:31    TPro  7.3  /  Alb  2.9<L>  /  TBili  0.3  /  DBili  x   /  AST  28  /  ALT  14  /  AlkPhos  64  09-24        RADIOLOGY & ADDITIONAL TESTS:    Consultant(s) Notes Reviewed: Renal    Care Discussed with Consultants/Other Providers:  Nephrology regarding HTN management

## 2017-09-25 NOTE — DISCHARGE NOTE ADULT - CARE PLAN
Principal Discharge DX:	Influenza  Goal:	finished flu treatment  Instructions for follow-up, activity and diet:	follow up with primary care physician within one week after discharge  Secondary Diagnosis:	Dementia  Instructions for follow-up, activity and diet:	take Remeron as directed  call primary care physician about restarting Risperdal  Secondary Diagnosis:	Acute on chronic kidney failure  Instructions for follow-up, activity and diet:	Avoid taking (NSAIDs) - (ex: Ibuprofen, Advil, Celebrex, Naprosyn)  Avoid taking any nephrotoxic agents (can harm kidneys) - Intravenous contrast for diagnostic testing, combination cold medications.  Have all medications adjusted for your renal function by your Health Care Provider.  Blood pressure control is important.  Take all medication as prescribed  adequate hydration is essential.  Secondary Diagnosis:	Chronic indwelling Roque catheter  Instructions for follow-up, activity and diet:	Roque catheter needs to be changed monthly - check last roque change  routine roque care for infection control  Secondary Diagnosis:	Essential hypertension  Instructions for follow-up, activity and diet:	Follow up with your medical doctor to establish long term blood pressure treatment goals.  Hydralazine is new for hypertension control Principal Discharge DX:	Influenza  Goal:	finished flu treatment  Instructions for follow-up, activity and diet:	follow up with primary care physician within one week after discharge  Proventil inhaler four times daily as directed for wheezing &/or shortness of breath  Secondary Diagnosis:	Dementia  Instructions for follow-up, activity and diet:	take Remeron as directed  call primary care physician about restarting Risperdal  Secondary Diagnosis:	Acute on chronic kidney failure  Instructions for follow-up, activity and diet:	Avoid taking (NSAIDs) - (ex: Ibuprofen, Advil, Celebrex, Naprosyn)  Avoid taking any nephrotoxic agents (can harm kidneys) - Intravenous contrast for diagnostic testing, combination cold medications.  Have all medications adjusted for your renal function by your Health Care Provider.  Blood pressure control is important.  Take all medication as prescribed  adequate hydration is essential.  Secondary Diagnosis:	Chronic indwelling Roque catheter  Instructions for follow-up, activity and diet:	Roque catheter needs to be changed monthly - check last roque change  routine roque care for infection control  Secondary Diagnosis:	Essential hypertension  Instructions for follow-up, activity and diet:	Follow up with your medical doctor to establish long term blood pressure treatment goals.  Hydralazine is new for hypertension control Principal Discharge DX:	Influenza  Goal:	finished flu treatment  Instructions for follow-up, activity and diet:	follow up with primary care physician within one week after discharge  Proventil inhaler four times daily as directed for wheezing &/or shortness of breath  Secondary Diagnosis:	Dementia  Instructions for follow-up, activity and diet:	take Remeron as directed  call primary care physician about restarting Risperdal  Secondary Diagnosis:	Acute on chronic kidney failure  Instructions for follow-up, activity and diet:	Avoid taking (NSAIDs) - (ex: Ibuprofen, Advil, Celebrex, Naprosyn)  Avoid taking any nephrotoxic agents (can harm kidneys) - Intravenous contrast for diagnostic testing, combination cold medications.  -please continue to maintain good hydration.  If you start not to eat well, please go to your physician to ensure your kidney function is not worsening.   Have all medications adjusted for your renal function by your Health Care Provider.  Blood pressure control is important.  Take all medication as prescribed  adequate hydration is essential.  Secondary Diagnosis:	Chronic indwelling Roque catheter  Instructions for follow-up, activity and diet:	Roque catheter needs to be changed monthly - check last roque change  routine roque care for infection control  Secondary Diagnosis:	Essential hypertension  Instructions for follow-up, activity and diet:	Follow up with your medical doctor to establish long term blood pressure treatment goals.  Hydralazine is new for hypertension control

## 2017-09-25 NOTE — PHYSICAL THERAPY INITIAL EVALUATION ADULT - PERTINENT HX OF CURRENT PROBLEM, REHAB EVAL
81 yo F,Patient states she has not been feeling well last few days. However, on further interview with family members, patient has been weak and not herself last few days.  She Has been having cough and headache last two days. Patient has a chronic indwelling catheter. 81 yo F,Patient states she has not been feeling well last few days. patient has been weak and not herself last few days.  She Has been having cough and headache last two days. Patient has a chronic indwelling catheter.

## 2017-09-25 NOTE — PROVIDER CONTACT NOTE (OTHER) - ACTION/TREATMENT ORDERED:
Lopressor 50mg PO given  Hydralazine 5mg IVP given  Will cont to monitor.
OK to give PO 25mg hydralazine now
Will followup with ID for order

## 2017-09-25 NOTE — PROGRESS NOTE ADULT - PROBLEM SELECTOR PLAN 1
-resolved, afebrile for 48 horus.  -today day 5/5 of tamiflu  -no wheezing today on exam  -ambulate off O2 and monitor saturations. If does not desaturate to 88%, can D/C  -continue with duonebs prn for wheeze.

## 2017-09-25 NOTE — DISCHARGE NOTE ADULT - HOME CARE AGENCY
Home Care referral forwarded to [facility] [phone] care for visiting nurse and home physical therapy. A nurse will call you the day after discharge to schedule your appointment.

## 2017-09-26 VITALS
HEART RATE: 82 BPM | DIASTOLIC BLOOD PRESSURE: 79 MMHG | TEMPERATURE: 98 F | OXYGEN SATURATION: 95 % | RESPIRATION RATE: 18 BRPM | SYSTOLIC BLOOD PRESSURE: 149 MMHG

## 2017-09-26 PROCEDURE — 99239 HOSP IP/OBS DSCHRG MGMT >30: CPT

## 2017-09-26 RX ORDER — ALBUTEROL 90 UG/1
2 AEROSOL, METERED ORAL
Qty: 1 | Refills: 0 | OUTPATIENT
Start: 2017-09-26 | End: 2017-10-26

## 2017-09-26 RX ORDER — MIRABEGRON 50 MG/1
20 TABLET, EXTENDED RELEASE ORAL
Qty: 0 | Refills: 0 | COMMUNITY

## 2017-09-26 RX ADMIN — Medication 50 MILLIGRAM(S): at 05:41

## 2017-09-26 RX ADMIN — Medication 3 MILLILITER(S): at 05:40

## 2017-09-26 RX ADMIN — Medication 25 MILLIGRAM(S): at 05:41

## 2017-09-26 RX ADMIN — HEPARIN SODIUM 5000 UNIT(S): 5000 INJECTION INTRAVENOUS; SUBCUTANEOUS at 05:41

## 2017-09-26 RX ADMIN — PANTOPRAZOLE SODIUM 40 MILLIGRAM(S): 20 TABLET, DELAYED RELEASE ORAL at 05:41

## 2017-09-26 RX ADMIN — Medication 325 MILLIGRAM(S): at 08:47

## 2017-09-26 RX ADMIN — Medication 650 MILLIGRAM(S): at 05:41

## 2017-09-26 NOTE — PROGRESS NOTE ADULT - PROBLEM SELECTOR PROBLEM 1
Sepsis, due to unspecified organism

## 2017-09-26 NOTE — PROGRESS NOTE ADULT - PROBLEM SELECTOR PLAN 6
continue with chronic roque - urine appears mostly clear. UA likely represents colonization and not infection.

## 2017-09-26 NOTE — PROGRESS NOTE ADULT - PROBLEM SELECTOR PROBLEM 5
Dementia with behavioral disturbance, unspecified dementia type

## 2017-09-26 NOTE — PROGRESS NOTE ADULT - PROBLEM SELECTOR PROBLEM 3
CKD (chronic kidney disease) stage 4, GFR 15-29 ml/min

## 2017-09-26 NOTE — PROGRESS NOTE ADULT - ASSESSMENT
3 yo F, with PMH of CKD 4, dementia, neurogenic bladder w/chronic roque, HTN, right knee periprosthetic fracture hx with influenza +    1- NELL on ckd IV baseline cr stage IV with cr 2.2-2.5  2- hyperkalemia  3- htn  4- anemia  5- influenza + infection   6- acidosis    cr istill elevated. hopefully slowly improve over days  bp quite high  agree with hydralazine 25mg tid  cont with roque for neurogenic bladder  resolving infection   hold epogen
81 yo female PMHx CKD IV, neurogenic bladder with chronic roque, dementia admitted with sepsis 2/2 influenza infection AH3, c/b NELL on CKD stage 4.
81 yo female PMHx CKD IV, neurogenic bladder with chronic roque, dementia admitted with sepsis 2/2 influenza infection AH3, c/b NELL on CKD stage 4.
83 yo F, with PMH of CKD 4, dementia, neurogenic bladder w/chronic roque, HTN, right knee periprosthetic fracture hx with influenza +    1- NELL on ckd IV baseline cr stage IV with cr 2.2-2.5  2- hyperkalemia  3- htn  4- anemia  5- influenza + infection     cr improving slowly  bp quite high  agree with hydralazine 25mg tid however, also to receive lasix 40mg po x1  in addition cont with tamiflu course  roque to cont  check orthostatics. monitor hb  d/w primary teanm
83 yo F, with PMH of CKD 4, dementia, neurogenic bladder w/chronic roque, HTN, right knee periprosthetic fracture hx with influenza +    1- NELL on ckd IV baseline cr stage IV with cr 2.2-2.5  2- hyperkalemia  3- htn  4- anemia  5- influenza + infection   6- acidosis    check creatinine and lytes this am    hydralazine 25mg tid  cont with roque for neurogenic bladder  hold epogen   sodium bicarbonate tab one tid  dc plan per primary team
83 yo F, with PMH of CKD 4, dementia, neurogenic bladder w/chronic roque, HTN, right knee periprosthetic fracture hx with influenza +    1- NELL on ckd IV baseline cr stage IV with cr 2.2-2.5  2- hyperkalemia  3- htn  4- anemia  5- influenza + infection   cr improving cont with ivf today   low k diet, k improved after kayexalate    cont with metoprolol  neurogenic bladder cont with roque catheter   hb in range hold procrit today   cont with tamiflu 30 mg daily
83 yo female PMHx CKD IV, neurogenic bladder with chronic roque, dementia admitted with sepsis 2/2 influenza c/b NELL
83 yo female PMHx CKD IV, neurogenic bladder with chronic roque, dementia admitted with sepsis 2/2 influenza infection AH3, c/b NELL on CKD stage 4.
83 yo female PMHx CKD IV, neurogenic bladder with chronic roque, dementia admitted with sepsis 2/2 influenza infection AH3, c/b NELL on CKD stage 4.

## 2017-09-26 NOTE — PROGRESS NOTE ADULT - PROBLEM SELECTOR PROBLEM 6
Neurogenic bladder disorder

## 2017-09-26 NOTE — PROGRESS NOTE ADULT - PROBLEM SELECTOR PLAN 5
off Risperdal from prior admission.  -Restarted Remeron 15mg
off Risperdal from prior admission per last discharge paperwork.  -Restarted Remeron 15mg
off Risperdal from prior admission.  -Restarted Remeron 15mg
off Risperdal from prior admission.  -Restarted Remeron 15mg
off risperdal from prior admission.  -would restart remeron 15mg

## 2017-09-26 NOTE — PROGRESS NOTE ADULT - PROBLEM SELECTOR PLAN 3
NELL on CKD IV likely due to dehydration (decreased PO intake), now at basliene  -S/p IV hydration.   -Monitor Creatinine daily  -Nephrology following daily.

## 2017-09-26 NOTE — PROGRESS NOTE ADULT - PROBLEM SELECTOR PLAN 7
HSQ.  -awaiting  to arrive to discuss further GOC
HSQ.
HSQ.  -awaiting  to arrive to discuss further GOC

## 2017-09-26 NOTE — PROGRESS NOTE ADULT - PROBLEM SELECTOR PLAN 1
-resolved, afebrile for 72 hrs  -finished 5 days of tamiflu  -today with + wheezing ,but still saturating well.   -  -no wheezing today on exam, 95% RA after ambulation yesterday  -continue with duonebs for discharge  -no indication for steriods, patient with history of fracture would rather not place on steroisd.  -follow up with PCP 1 week for check-up

## 2017-09-26 NOTE — PROGRESS NOTE ADULT - SUBJECTIVE AND OBJECTIVE BOX
Patient is a 82y old  Female who presents with a chief complaint of Fever and decreased PO (21 Sep 2017 21:31)      SUBJECTIVE / OVERNIGHT EVENTS: No acute events overnight.  Patient states has no complaints. Denies fever, chills, chest pain, SOB, abdominal pain, n/v/d/c, dysuria.    MEDICATIONS  (STANDING):  sodium bicarbonate 650 milliGRAM(s) Oral three times a day  simvastatin 20 milliGRAM(s) Oral at bedtime  metoprolol 50 milliGRAM(s) Oral two times a day  ferrous    sulfate 325 milliGRAM(s) Oral two times a day with meals  pantoprazole    Tablet 40 milliGRAM(s) Oral before breakfast  heparin  Injectable 5000 Unit(s) SubCutaneous every 12 hours  ALBUTerol/ipratropium for Nebulization 3 milliLiter(s) Nebulizer every 6 hours  guaiFENesin   Syrup  (Sugar-Free) 100 milliGRAM(s) Oral every 6 hours  mirtazapine 15 milliGRAM(s) Oral at bedtime  hydrALAZINE 25 milliGRAM(s) Oral every 8 hours  furosemide    Tablet 40 milliGRAM(s) Oral once    MEDICATIONS  (PRN):  acetaminophen   Tablet 650 milliGRAM(s) Oral every 6 hours PRN For Temp greater than 38 C (100.4 F)    Vital Signs Last 24 Hrs  T(C): 36.7 (26 Sep 2017 04:29), Max: 36.8 (25 Sep 2017 21:12)  T(F): 98 (26 Sep 2017 04:29), Max: 98.2 (25 Sep 2017 21:12)  HR: 82 (26 Sep 2017 04:29) (81 - 97)  BP: 149/79 (26 Sep 2017 04:29) (118/72 - 173/100)  BP(mean): --  RR: 18 (26 Sep 2017 04:29) (16 - 18)  SpO2: 95% (26 Sep 2017 04:29) (95% - 96%)    I&O's Summary    24 Sep 2017 07:01  -  25 Sep 2017 07:00  --------------------------------------------------------  IN: 1130 mL / OUT: 750 mL / NET: 380 mL    25 Sep 2017 07:01  -  25 Sep 2017 12:37  --------------------------------------------------------  IN: 240 mL / OUT: 0 mL / NET: 240 mL        PHYSICAL EXAM:  GENERAL: NAD, well-developed  HEAD:  Atraumatic, Normocephalic  EYES: EOMI, PERRLA, conjunctiva and sclera clear  CHEST/LUNG: +expiratory wheeze b/l today.  No rhonchi, wheezing.  HEART: Regular rate and rhythm; No murmurs, rubs, or gallops  ABDOMEN: Soft, Nontender, Nondistended; Bowel sounds present  EXTREMITIES:  No clubbing, cyanosis, or edema  PSYCH: AAOx3  NEUROLOGY: non-focal  SKIN: No rashes or lesions      LABS:                                   11.5   5.63  )-----------( 240      ( 25 Sep 2017 07:27 )             38.1   09-25    139  |  101  |  35<H>  ----------------------------<  83  4.5   |  22  |  2.96<H>    Ca    9.7      25 Sep 2017 07:31            RADIOLOGY & ADDITIONAL TESTS:    Consultant(s) Notes Reviewed: Renal    Care Discussed with Consultants/Other Providers:

## 2017-09-26 NOTE — PROGRESS NOTE ADULT - SUBJECTIVE AND OBJECTIVE BOX
[facility]   [phone]  RENAL FOLLOW UP NOTE  --------------------------------------------------------------------------------  Chief Complaint:    24 hour events/subjective:    states feeling better today than yesterday. no cough  has dementia reliability poor    PAST HISTORY  --------------------------------------------------------------------------------  No significant changes to PMH, PSH, FHx, SHx, unless otherwise noted    ALLERGIES & MEDICATIONS  --------------------------------------------------------------------------------  Allergies    No Known Allergies    Intolerances      Standing Inpatient Medications  sodium bicarbonate 650 milliGRAM(s) Oral three times a day  simvastatin 20 milliGRAM(s) Oral at bedtime  metoprolol 50 milliGRAM(s) Oral two times a day  ferrous    sulfate 325 milliGRAM(s) Oral two times a day with meals  pantoprazole    Tablet 40 milliGRAM(s) Oral before breakfast  heparin  Injectable 5000 Unit(s) SubCutaneous every 12 hours  ALBUTerol/ipratropium for Nebulization 3 milliLiter(s) Nebulizer every 6 hours  oseltamivir 30 milliGRAM(s) Oral daily  guaiFENesin   Syrup  (Sugar-Free) 100 milliGRAM(s) Oral every 6 hours  mirtazapine 15 milliGRAM(s) Oral at bedtime  hydrALAZINE 25 milliGRAM(s) Oral every 8 hours    PRN Inpatient Medications  acetaminophen   Tablet 650 milliGRAM(s) Oral every 6 hours PRN      REVIEW OF SYSTEMS  --------------------------------------------------------------------------------    Gen: denies fatigue, fevers/chills,  CVS: denies chest pain/palpitations  Resp: denies SOB/Cough  GI: Denies N/V/Abd pain  : Denies dysuria/ has roque   All other systems were reviewed and are negative, except as noted.    VITALS/PHYSICAL EXAM  --------------------------------------------------------------------------------  T(C): 36.7 (09-26-17 @ 04:29), Max: 36.8 (09-25-17 @ 21:12)  HR: 82 (09-26-17 @ 04:29) (81 - 97)  BP: 149/79 (09-26-17 @ 04:29) (118/72 - 173/100)  RR: 18 (09-26-17 @ 04:29) (16 - 18)  SpO2: 95% (09-26-17 @ 04:29) (95% - 96%)  Wt(kg): --        09-25-17 @ 07:01  -  09-26-17 @ 07:00  --------------------------------------------------------  IN: 1080 mL / OUT: 1350 mL / NET: -270 mL      Physical Exam:  	  Gen: communicative but has dementia  	Pulm: Decreased breath sounds b/l bases. no rales or ronchi  exp fine mid to late  wheezing  	CV: RRR, S1/S2. no rub  	Back: No CVA tenderness; no sacral edema  	Abd: +BS, soft, nontender/nondistended  	: No suprapubic tenderness.               Extremity: No cyanosis, no edema no clubbing  LABS/STUDIES  --------------------------------------------------------------------------------              11.5   5.63  >-----------<  240      [09-25-17 @ 07:27]              38.1     139  |  101  |  35  ----------------------------<  83      [09-25-17 @ 07:31]  4.5   |  22  |  2.96        Ca     9.7     [09-25-17 @ 07:31]            Creatinine Trend:  SCr 2.96 [09-25 @ 07:31]  SCr 2.86 [09-24 @ 08:50]  SCr 2.89 [09-23 @ 08:32]  SCr 2.93 [09-22 @ 16:14]  SCr 3.00 [09-22 @ 08:02]              Urinalysis - [09-21-17 @ 23:02]      Color  / Appearance  / SG 1.007 / pH 7.0      Gluc Negative / Ketone Negative  / Bili Negative / Urobili Negative       Blood Large / Protein 30 / Leuk Est Large / Nitrite Negative      RBC 10-25 / WBC >50 / Hyaline  / Gran  / Sq Epi  / Non Sq Epi OCC / Bacteria Few      Iron 18, TIBC 173, %sat 10      [07-30-17 @ 12:34]  Ferritin 842.0      [07-30-17 @ 15:24]  PTH -- (Ca 8.9)      [05-05-17 @ 07:21]   32  Vitamin D (25OH) 116.0      [07-30-17 @ 15:24]  HbA1c 5.2      [07-30-17 @ 15:24]  TSH 5.58      [08-11-17 @ 07:12]  Lipid: chol 149, TG 65, HDL 49, LDL 87      [07-30-17 @ 12:34]

## 2017-09-28 PROBLEM — E78.5 HYPERLIPIDEMIA, UNSPECIFIED: Chronic | Status: ACTIVE | Noted: 2017-09-21

## 2017-09-29 ENCOUNTER — APPOINTMENT (OUTPATIENT)
Dept: GERIATRICS | Facility: CLINIC | Age: 82
End: 2017-09-29
Payer: MEDICARE

## 2017-09-29 VITALS
RESPIRATION RATE: 15 BRPM | TEMPERATURE: 98.3 F | HEIGHT: 57 IN | HEART RATE: 91 BPM | DIASTOLIC BLOOD PRESSURE: 82 MMHG | SYSTOLIC BLOOD PRESSURE: 142 MMHG | OXYGEN SATURATION: 94 % | BODY MASS INDEX: 26.97 KG/M2 | WEIGHT: 125 LBS

## 2017-09-29 DIAGNOSIS — Z23 ENCOUNTER FOR IMMUNIZATION: ICD-10-CM

## 2017-09-29 DIAGNOSIS — K26.9 DUODENAL ULCER, UNSPECIFIED AS ACUTE OR CHRONIC, W/OUT HEMORRHAGE OR PERFORATION: ICD-10-CM

## 2017-09-29 DIAGNOSIS — Z71.89 OTHER SPECIFIED COUNSELING: ICD-10-CM

## 2017-09-29 DIAGNOSIS — I10 ESSENTIAL (PRIMARY) HYPERTENSION: ICD-10-CM

## 2017-09-29 DIAGNOSIS — K12.1 OTHER FORMS OF STOMATITIS: ICD-10-CM

## 2017-09-29 PROCEDURE — 99497 ADVNCD CARE PLAN 30 MIN: CPT

## 2017-09-29 PROCEDURE — 99214 OFFICE O/P EST MOD 30 MIN: CPT

## 2017-09-29 RX ORDER — SODIUM BICARBONATE 650 MG/1
650 TABLET ORAL 3 TIMES DAILY
Refills: 0 | Status: ACTIVE | COMMUNITY
Start: 2017-09-29

## 2017-10-18 ENCOUNTER — TRANSCRIPTION ENCOUNTER (OUTPATIENT)
Age: 82
End: 2017-10-18

## 2017-10-19 PROCEDURE — 81001 URINALYSIS AUTO W/SCOPE: CPT

## 2017-10-19 PROCEDURE — 82435 ASSAY OF BLOOD CHLORIDE: CPT

## 2017-10-19 PROCEDURE — 87086 URINE CULTURE/COLONY COUNT: CPT

## 2017-10-19 PROCEDURE — 87205 SMEAR GRAM STAIN: CPT

## 2017-10-19 PROCEDURE — 80048 BASIC METABOLIC PNL TOTAL CA: CPT

## 2017-10-19 PROCEDURE — 85027 COMPLETE CBC AUTOMATED: CPT

## 2017-10-19 PROCEDURE — 82570 ASSAY OF URINE CREATININE: CPT

## 2017-10-19 PROCEDURE — 85610 PROTHROMBIN TIME: CPT

## 2017-10-19 PROCEDURE — 84132 ASSAY OF SERUM POTASSIUM: CPT

## 2017-10-19 PROCEDURE — 84300 ASSAY OF URINE SODIUM: CPT

## 2017-10-19 PROCEDURE — 85730 THROMBOPLASTIN TIME PARTIAL: CPT

## 2017-10-19 PROCEDURE — 76775 US EXAM ABDO BACK WALL LIM: CPT

## 2017-10-19 PROCEDURE — 51702 INSERT TEMP BLADDER CATH: CPT

## 2017-10-19 PROCEDURE — 84443 ASSAY THYROID STIM HORMONE: CPT

## 2017-10-19 PROCEDURE — 80053 COMPREHEN METABOLIC PANEL: CPT

## 2017-10-19 PROCEDURE — 83605 ASSAY OF LACTIC ACID: CPT

## 2017-10-19 PROCEDURE — 93005 ELECTROCARDIOGRAM TRACING: CPT | Mod: XU

## 2017-10-19 PROCEDURE — 94640 AIRWAY INHALATION TREATMENT: CPT

## 2017-10-19 PROCEDURE — 97161 PT EVAL LOW COMPLEX 20 MIN: CPT

## 2017-10-19 PROCEDURE — 84295 ASSAY OF SERUM SODIUM: CPT

## 2017-10-19 PROCEDURE — 82330 ASSAY OF CALCIUM: CPT

## 2017-10-19 PROCEDURE — 83735 ASSAY OF MAGNESIUM: CPT

## 2017-10-19 PROCEDURE — 99285 EMERGENCY DEPT VISIT HI MDM: CPT | Mod: 25

## 2017-10-19 PROCEDURE — 71045 X-RAY EXAM CHEST 1 VIEW: CPT

## 2017-10-19 PROCEDURE — 85014 HEMATOCRIT: CPT

## 2017-10-19 PROCEDURE — 82947 ASSAY GLUCOSE BLOOD QUANT: CPT

## 2017-10-19 PROCEDURE — 87486 CHLMYD PNEUM DNA AMP PROBE: CPT

## 2017-10-19 PROCEDURE — 87633 RESP VIRUS 12-25 TARGETS: CPT

## 2017-10-19 PROCEDURE — 96374 THER/PROPH/DIAG INJ IV PUSH: CPT | Mod: XU

## 2017-10-19 PROCEDURE — 84100 ASSAY OF PHOSPHORUS: CPT

## 2017-10-19 PROCEDURE — 97162 PT EVAL MOD COMPLEX 30 MIN: CPT

## 2017-10-19 PROCEDURE — 87581 M.PNEUMON DNA AMP PROBE: CPT

## 2017-10-19 PROCEDURE — 82803 BLOOD GASES ANY COMBINATION: CPT

## 2017-10-19 PROCEDURE — 99285 EMERGENCY DEPT VISIT HI MDM: CPT

## 2017-10-19 PROCEDURE — 87040 BLOOD CULTURE FOR BACTERIA: CPT

## 2017-10-19 PROCEDURE — 87798 DETECT AGENT NOS DNA AMP: CPT

## 2017-11-21 ENCOUNTER — APPOINTMENT (OUTPATIENT)
Dept: GERIATRICS | Facility: CLINIC | Age: 82
End: 2017-11-21

## 2017-11-30 ENCOUNTER — TRANSCRIPTION ENCOUNTER (OUTPATIENT)
Age: 82
End: 2017-11-30

## 2017-12-01 ENCOUNTER — TRANSCRIPTION ENCOUNTER (OUTPATIENT)
Age: 82
End: 2017-12-01

## 2017-12-12 ENCOUNTER — TRANSCRIPTION ENCOUNTER (OUTPATIENT)
Age: 82
End: 2017-12-12

## 2017-12-13 ENCOUNTER — TRANSCRIPTION ENCOUNTER (OUTPATIENT)
Age: 82
End: 2017-12-13

## 2017-12-14 ENCOUNTER — TRANSCRIPTION ENCOUNTER (OUTPATIENT)
Age: 82
End: 2017-12-14

## 2017-12-14 ENCOUNTER — APPOINTMENT (OUTPATIENT)
Dept: UROLOGY | Facility: CLINIC | Age: 82
End: 2017-12-14

## 2017-12-20 ENCOUNTER — TRANSCRIPTION ENCOUNTER (OUTPATIENT)
Age: 82
End: 2017-12-20

## 2018-01-23 ENCOUNTER — TRANSCRIPTION ENCOUNTER (OUTPATIENT)
Age: 83
End: 2018-01-23

## 2018-01-30 ENCOUNTER — TRANSCRIPTION ENCOUNTER (OUTPATIENT)
Age: 83
End: 2018-01-30

## 2018-04-09 ENCOUNTER — RX RENEWAL (OUTPATIENT)
Age: 83
End: 2018-04-09

## 2018-04-30 ENCOUNTER — RX RENEWAL (OUTPATIENT)
Age: 83
End: 2018-04-30

## 2018-04-30 ENCOUNTER — TRANSCRIPTION ENCOUNTER (OUTPATIENT)
Age: 83
End: 2018-04-30

## 2018-06-01 ENCOUNTER — APPOINTMENT (OUTPATIENT)
Dept: ULTRASOUND IMAGING | Facility: IMAGING CENTER | Age: 83
End: 2018-06-01
Payer: MEDICARE

## 2018-06-01 ENCOUNTER — OUTPATIENT (OUTPATIENT)
Dept: OUTPATIENT SERVICES | Facility: HOSPITAL | Age: 83
LOS: 1 days | End: 2018-06-01
Payer: MEDICARE

## 2018-06-01 DIAGNOSIS — Z98.890 OTHER SPECIFIED POSTPROCEDURAL STATES: Chronic | ICD-10-CM

## 2018-06-01 DIAGNOSIS — Z96.653 PRESENCE OF ARTIFICIAL KNEE JOINT, BILATERAL: Chronic | ICD-10-CM

## 2018-06-01 DIAGNOSIS — Z00.8 ENCOUNTER FOR OTHER GENERAL EXAMINATION: ICD-10-CM

## 2018-06-01 DIAGNOSIS — Z98.1 ARTHRODESIS STATUS: Chronic | ICD-10-CM

## 2018-06-01 PROCEDURE — 76770 US EXAM ABDO BACK WALL COMP: CPT

## 2018-06-01 PROCEDURE — 76770 US EXAM ABDO BACK WALL COMP: CPT | Mod: 26

## 2018-06-06 ENCOUNTER — APPOINTMENT (OUTPATIENT)
Dept: GERIATRICS | Facility: CLINIC | Age: 83
End: 2018-06-06
Payer: MEDICARE

## 2018-06-06 VITALS
BODY MASS INDEX: 28.39 KG/M2 | SYSTOLIC BLOOD PRESSURE: 160 MMHG | TEMPERATURE: 98.2 F | DIASTOLIC BLOOD PRESSURE: 80 MMHG | WEIGHT: 131.2 LBS | HEART RATE: 88 BPM | OXYGEN SATURATION: 99 %

## 2018-06-06 DIAGNOSIS — D64.9 ANEMIA, UNSPECIFIED: ICD-10-CM

## 2018-06-06 DIAGNOSIS — M81.0 AGE-RELATED OSTEOPOROSIS W/OUT CURRENT PATHOLOGICAL FRACTURE: ICD-10-CM

## 2018-06-06 DIAGNOSIS — N31.9 NEUROMUSCULAR DYSFUNCTION OF BLADDER, UNSPECIFIED: ICD-10-CM

## 2018-06-06 DIAGNOSIS — F32.9 MAJOR DEPRESSIVE DISORDER, SINGLE EPISODE, UNSPECIFIED: ICD-10-CM

## 2018-06-06 DIAGNOSIS — M48.00 SPINAL STENOSIS, SITE UNSPECIFIED: ICD-10-CM

## 2018-06-06 DIAGNOSIS — N18.9 CHRONIC KIDNEY DISEASE, UNSPECIFIED: ICD-10-CM

## 2018-06-06 DIAGNOSIS — E78.5 HYPERLIPIDEMIA, UNSPECIFIED: ICD-10-CM

## 2018-06-06 DIAGNOSIS — F02.80 ALZHEIMER'S DISEASE, UNSPECIFIED: ICD-10-CM

## 2018-06-06 DIAGNOSIS — G30.9 ALZHEIMER'S DISEASE, UNSPECIFIED: ICD-10-CM

## 2018-06-06 PROCEDURE — 99215 OFFICE O/P EST HI 40 MIN: CPT

## 2018-06-06 RX ORDER — LIDOCAINE HYDROCHLORIDE 20 MG/ML
2 SOLUTION OROPHARYNGEAL
Qty: 20 | Refills: 0 | Status: DISCONTINUED | COMMUNITY
Start: 2017-09-29 | End: 2018-06-06

## 2018-06-06 RX ORDER — AMOXICILLIN 500 MG/1
500 CAPSULE ORAL
Qty: 1 | Refills: 0 | Status: DISCONTINUED | COMMUNITY
Start: 2017-07-27 | End: 2018-06-06

## 2018-06-07 LAB
ALBUMIN SERPL ELPH-MCNC: 3.5 G/DL
ALP BLD-CCNC: 105 U/L
ALT SERPL-CCNC: 10 U/L
ANION GAP SERPL CALC-SCNC: 16 MMOL/L
AST SERPL-CCNC: 12 U/L
BASOPHILS # BLD AUTO: 0.02 K/UL
BASOPHILS NFR BLD AUTO: 0.3 %
BILIRUB SERPL-MCNC: 0.2 MG/DL
BUN SERPL-MCNC: 45 MG/DL
CALCIUM SERPL-MCNC: 9.9 MG/DL
CHLORIDE SERPL-SCNC: 103 MMOL/L
CK SERPL-CCNC: 25 U/L
CO2 SERPL-SCNC: 23 MMOL/L
CREAT SERPL-MCNC: 3.73 MG/DL
EOSINOPHIL # BLD AUTO: 0.26 K/UL
EOSINOPHIL NFR BLD AUTO: 3.3 %
FERRITIN SERPL-MCNC: 809 NG/ML
GLUCOSE SERPL-MCNC: 105 MG/DL
HCT VFR BLD CALC: 31.5 %
HGB BLD-MCNC: 9.3 G/DL
IMM GRANULOCYTES NFR BLD AUTO: 1.1 %
IRON SATN MFR SERPL: 17 %
IRON SERPL-MCNC: 32 UG/DL
LYMPHOCYTES # BLD AUTO: 1.91 K/UL
LYMPHOCYTES NFR BLD AUTO: 24 %
MAN DIFF?: NORMAL
MCHC RBC-ENTMCNC: 26.4 PG
MCHC RBC-ENTMCNC: 29.5 GM/DL
MCV RBC AUTO: 89.5 FL
MONOCYTES # BLD AUTO: 0.88 K/UL
MONOCYTES NFR BLD AUTO: 11.1 %
NEUTROPHILS # BLD AUTO: 4.79 K/UL
NEUTROPHILS NFR BLD AUTO: 60.2 %
PLATELET # BLD AUTO: 365 K/UL
POTASSIUM SERPL-SCNC: 5.5 MMOL/L
PROT SERPL-MCNC: 7.9 G/DL
RBC # BLD: 3.52 M/UL
RBC # FLD: 15.8 %
SODIUM SERPL-SCNC: 142 MMOL/L
TIBC SERPL-MCNC: 191 UG/DL
TSH SERPL-ACNC: 6.26 UIU/ML
UIBC SERPL-MCNC: 159 UG/DL
URATE SERPL-MCNC: 7 MG/DL
WBC # FLD AUTO: 7.95 K/UL

## 2018-06-11 ENCOUNTER — TRANSCRIPTION ENCOUNTER (OUTPATIENT)
Age: 83
End: 2018-06-11

## 2018-06-11 LAB — PTH RELATED PROT SERPL-MCNC: <2 PMOL/L

## 2018-07-02 ENCOUNTER — TRANSCRIPTION ENCOUNTER (OUTPATIENT)
Age: 83
End: 2018-07-02

## 2018-07-03 ENCOUNTER — TRANSCRIPTION ENCOUNTER (OUTPATIENT)
Age: 83
End: 2018-07-03

## 2018-07-03 ENCOUNTER — INPATIENT (INPATIENT)
Facility: HOSPITAL | Age: 83
LOS: 2 days | Discharge: NURSING, REHAB OR LONG-TERM CARE | DRG: 563 | End: 2018-07-06
Attending: HOSPITALIST | Admitting: HOSPITALIST
Payer: MEDICARE

## 2018-07-03 ENCOUNTER — CLINICAL ADVICE (OUTPATIENT)
Age: 83
End: 2018-07-03

## 2018-07-03 VITALS
SYSTOLIC BLOOD PRESSURE: 124 MMHG | WEIGHT: 149.91 LBS | HEART RATE: 88 BPM | TEMPERATURE: 98 F | DIASTOLIC BLOOD PRESSURE: 76 MMHG | HEIGHT: 63 IN | RESPIRATION RATE: 16 BRPM

## 2018-07-03 DIAGNOSIS — S82.892A OTHER FRACTURE OF LEFT LOWER LEG, INITIAL ENCOUNTER FOR CLOSED FRACTURE: ICD-10-CM

## 2018-07-03 DIAGNOSIS — N31.9 NEUROMUSCULAR DYSFUNCTION OF BLADDER, UNSPECIFIED: ICD-10-CM

## 2018-07-03 DIAGNOSIS — N18.9 CHRONIC KIDNEY DISEASE, UNSPECIFIED: ICD-10-CM

## 2018-07-03 DIAGNOSIS — R82.90 UNSPECIFIED ABNORMAL FINDINGS IN URINE: ICD-10-CM

## 2018-07-03 DIAGNOSIS — Z98.1 ARTHRODESIS STATUS: Chronic | ICD-10-CM

## 2018-07-03 DIAGNOSIS — E78.5 HYPERLIPIDEMIA, UNSPECIFIED: ICD-10-CM

## 2018-07-03 DIAGNOSIS — Z98.890 OTHER SPECIFIED POSTPROCEDURAL STATES: Chronic | ICD-10-CM

## 2018-07-03 DIAGNOSIS — I10 ESSENTIAL (PRIMARY) HYPERTENSION: ICD-10-CM

## 2018-07-03 DIAGNOSIS — Z29.9 ENCOUNTER FOR PROPHYLACTIC MEASURES, UNSPECIFIED: ICD-10-CM

## 2018-07-03 DIAGNOSIS — Z96.653 PRESENCE OF ARTIFICIAL KNEE JOINT, BILATERAL: Chronic | ICD-10-CM

## 2018-07-03 LAB
ALBUMIN SERPL ELPH-MCNC: 3.4 G/DL — SIGNIFICANT CHANGE UP (ref 3.3–5)
ALP SERPL-CCNC: 94 U/L — SIGNIFICANT CHANGE UP (ref 40–120)
ALT FLD-CCNC: 11 U/L — SIGNIFICANT CHANGE UP (ref 10–45)
ANION GAP SERPL CALC-SCNC: 12 MMOL/L — SIGNIFICANT CHANGE UP (ref 5–17)
APPEARANCE UR: ABNORMAL
APTT BLD: 31.8 SEC — SIGNIFICANT CHANGE UP (ref 27.5–37.4)
AST SERPL-CCNC: 16 U/L — SIGNIFICANT CHANGE UP (ref 10–40)
BASOPHILS # BLD AUTO: 0 K/UL — SIGNIFICANT CHANGE UP (ref 0–0.2)
BASOPHILS NFR BLD AUTO: 0.2 % — SIGNIFICANT CHANGE UP (ref 0–2)
BILIRUB SERPL-MCNC: 0.2 MG/DL — SIGNIFICANT CHANGE UP (ref 0.2–1.2)
BILIRUB UR-MCNC: NEGATIVE — SIGNIFICANT CHANGE UP
BLD GP AB SCN SERPL QL: NEGATIVE — SIGNIFICANT CHANGE UP
BUN SERPL-MCNC: 48 MG/DL — HIGH (ref 7–23)
CALCIUM SERPL-MCNC: 9 MG/DL — SIGNIFICANT CHANGE UP (ref 8.4–10.5)
CHLORIDE SERPL-SCNC: 101 MMOL/L — SIGNIFICANT CHANGE UP (ref 96–108)
CO2 SERPL-SCNC: 23 MMOL/L — SIGNIFICANT CHANGE UP (ref 22–31)
COLOR SPEC: SIGNIFICANT CHANGE UP
CREAT SERPL-MCNC: 4 MG/DL — HIGH (ref 0.5–1.3)
DIFF PNL FLD: ABNORMAL
EOSINOPHIL # BLD AUTO: 0.3 K/UL — SIGNIFICANT CHANGE UP (ref 0–0.5)
EOSINOPHIL NFR BLD AUTO: 2.8 % — SIGNIFICANT CHANGE UP (ref 0–6)
GLUCOSE SERPL-MCNC: 115 MG/DL — HIGH (ref 70–99)
GLUCOSE UR QL: NEGATIVE — SIGNIFICANT CHANGE UP
HCT VFR BLD CALC: 28.5 % — LOW (ref 34.5–45)
HGB BLD-MCNC: 8.9 G/DL — LOW (ref 11.5–15.5)
INR BLD: 1.05 RATIO — SIGNIFICANT CHANGE UP (ref 0.88–1.16)
KETONES UR-MCNC: NEGATIVE — SIGNIFICANT CHANGE UP
LEUKOCYTE ESTERASE UR-ACNC: ABNORMAL
LYMPHOCYTES # BLD AUTO: 2.1 K/UL — SIGNIFICANT CHANGE UP (ref 1–3.3)
LYMPHOCYTES # BLD AUTO: 23.3 % — SIGNIFICANT CHANGE UP (ref 13–44)
MCHC RBC-ENTMCNC: 27.2 PG — SIGNIFICANT CHANGE UP (ref 27–34)
MCHC RBC-ENTMCNC: 31.3 GM/DL — LOW (ref 32–36)
MCV RBC AUTO: 86.8 FL — SIGNIFICANT CHANGE UP (ref 80–100)
MONOCYTES # BLD AUTO: 1 K/UL — HIGH (ref 0–0.9)
MONOCYTES NFR BLD AUTO: 11 % — SIGNIFICANT CHANGE UP (ref 2–14)
NEUTROPHILS # BLD AUTO: 5.7 K/UL — SIGNIFICANT CHANGE UP (ref 1.8–7.4)
NEUTROPHILS NFR BLD AUTO: 62.6 % — SIGNIFICANT CHANGE UP (ref 43–77)
NITRITE UR-MCNC: NEGATIVE — SIGNIFICANT CHANGE UP
PH UR: 7.5 — SIGNIFICANT CHANGE UP (ref 5–8)
PLATELET # BLD AUTO: 354 K/UL — SIGNIFICANT CHANGE UP (ref 150–400)
POTASSIUM SERPL-MCNC: 5.1 MMOL/L — SIGNIFICANT CHANGE UP (ref 3.5–5.3)
POTASSIUM SERPL-SCNC: 5.1 MMOL/L — SIGNIFICANT CHANGE UP (ref 3.5–5.3)
PROT SERPL-MCNC: 8 G/DL — SIGNIFICANT CHANGE UP (ref 6–8.3)
PROT UR-MCNC: 100 MG/DL
PROTHROM AB SERPL-ACNC: 11.3 SEC — SIGNIFICANT CHANGE UP (ref 9.8–12.7)
RBC # BLD: 3.29 M/UL — LOW (ref 3.8–5.2)
RBC # FLD: 15 % — HIGH (ref 10.3–14.5)
RBC CASTS # UR COMP ASSIST: SIGNIFICANT CHANGE UP /HPF (ref 0–2)
RH IG SCN BLD-IMP: POSITIVE — SIGNIFICANT CHANGE UP
SODIUM SERPL-SCNC: 136 MMOL/L — SIGNIFICANT CHANGE UP (ref 135–145)
SP GR SPEC: 1.01 — SIGNIFICANT CHANGE UP (ref 1.01–1.02)
UROBILINOGEN FLD QL: NEGATIVE — SIGNIFICANT CHANGE UP
WBC # BLD: 9.2 K/UL — SIGNIFICANT CHANGE UP (ref 3.8–10.5)
WBC # FLD AUTO: 9.2 K/UL — SIGNIFICANT CHANGE UP (ref 3.8–10.5)
WBC UR QL: SIGNIFICANT CHANGE UP /HPF (ref 0–5)

## 2018-07-03 PROCEDURE — 73700 CT LOWER EXTREMITY W/O DYE: CPT | Mod: 26,LT

## 2018-07-03 PROCEDURE — 73630 X-RAY EXAM OF FOOT: CPT | Mod: 26,LT

## 2018-07-03 PROCEDURE — 99285 EMERGENCY DEPT VISIT HI MDM: CPT | Mod: 25,GC

## 2018-07-03 PROCEDURE — 73610 X-RAY EXAM OF ANKLE: CPT | Mod: 26,LT

## 2018-07-03 PROCEDURE — 93010 ELECTROCARDIOGRAM REPORT: CPT

## 2018-07-03 PROCEDURE — 99223 1ST HOSP IP/OBS HIGH 75: CPT

## 2018-07-03 PROCEDURE — 76377 3D RENDER W/INTRP POSTPROCES: CPT | Mod: 26

## 2018-07-03 RX ORDER — FAMOTIDINE 10 MG/ML
10 INJECTION INTRAVENOUS ONCE
Qty: 0 | Refills: 0 | Status: DISCONTINUED | OUTPATIENT
Start: 2018-07-03 | End: 2018-07-03

## 2018-07-03 RX ORDER — FERROUS SULFATE 325(65) MG
325 TABLET ORAL
Qty: 0 | Refills: 0 | Status: DISCONTINUED | OUTPATIENT
Start: 2018-07-03 | End: 2018-07-06

## 2018-07-03 RX ORDER — SIMVASTATIN 20 MG/1
20 TABLET, FILM COATED ORAL AT BEDTIME
Qty: 0 | Refills: 0 | Status: DISCONTINUED | OUTPATIENT
Start: 2018-07-03 | End: 2018-07-06

## 2018-07-03 RX ORDER — ALBUTEROL 90 UG/1
2 AEROSOL, METERED ORAL EVERY 6 HOURS
Qty: 0 | Refills: 0 | Status: DISCONTINUED | OUTPATIENT
Start: 2018-07-03 | End: 2018-07-06

## 2018-07-03 RX ORDER — HEPARIN SODIUM 5000 [USP'U]/ML
5000 INJECTION INTRAVENOUS; SUBCUTANEOUS EVERY 8 HOURS
Qty: 0 | Refills: 0 | Status: DISCONTINUED | OUTPATIENT
Start: 2018-07-03 | End: 2018-07-06

## 2018-07-03 RX ORDER — SODIUM BICARBONATE 1 MEQ/ML
650 SYRINGE (ML) INTRAVENOUS
Qty: 0 | Refills: 0 | Status: DISCONTINUED | OUTPATIENT
Start: 2018-07-03 | End: 2018-07-06

## 2018-07-03 RX ORDER — MIRABEGRON 50 MG/1
25 TABLET, EXTENDED RELEASE ORAL DAILY
Qty: 0 | Refills: 0 | Status: DISCONTINUED | OUTPATIENT
Start: 2018-07-03 | End: 2018-07-06

## 2018-07-03 RX ORDER — HYDROMORPHONE HYDROCHLORIDE 2 MG/ML
0.5 INJECTION INTRAMUSCULAR; INTRAVENOUS; SUBCUTANEOUS ONCE
Qty: 0 | Refills: 0 | Status: DISCONTINUED | OUTPATIENT
Start: 2018-07-03 | End: 2018-07-03

## 2018-07-03 RX ORDER — PANTOPRAZOLE SODIUM 20 MG/1
40 TABLET, DELAYED RELEASE ORAL
Qty: 0 | Refills: 0 | Status: DISCONTINUED | OUTPATIENT
Start: 2018-07-03 | End: 2018-07-06

## 2018-07-03 RX ORDER — ACETAMINOPHEN 500 MG
1000 TABLET ORAL ONCE
Qty: 0 | Refills: 0 | Status: COMPLETED | OUTPATIENT
Start: 2018-07-03 | End: 2018-07-03

## 2018-07-03 RX ORDER — METOPROLOL TARTRATE 50 MG
50 TABLET ORAL
Qty: 0 | Refills: 0 | Status: DISCONTINUED | OUTPATIENT
Start: 2018-07-03 | End: 2018-07-06

## 2018-07-03 RX ORDER — MIRTAZAPINE 45 MG/1
15 TABLET, ORALLY DISINTEGRATING ORAL AT BEDTIME
Qty: 0 | Refills: 0 | Status: DISCONTINUED | OUTPATIENT
Start: 2018-07-03 | End: 2018-07-06

## 2018-07-03 RX ORDER — MIRABEGRON 50 MG/1
1 TABLET, EXTENDED RELEASE ORAL
Qty: 0 | Refills: 0 | COMMUNITY

## 2018-07-03 RX ORDER — ACETAMINOPHEN 500 MG
650 TABLET ORAL EVERY 6 HOURS
Qty: 0 | Refills: 0 | Status: DISCONTINUED | OUTPATIENT
Start: 2018-07-03 | End: 2018-07-06

## 2018-07-03 RX ADMIN — Medication 400 MILLIGRAM(S): at 19:45

## 2018-07-03 RX ADMIN — HYDROMORPHONE HYDROCHLORIDE 0.5 MILLIGRAM(S): 2 INJECTION INTRAMUSCULAR; INTRAVENOUS; SUBCUTANEOUS at 20:11

## 2018-07-03 RX ADMIN — HYDROMORPHONE HYDROCHLORIDE 0.5 MILLIGRAM(S): 2 INJECTION INTRAMUSCULAR; INTRAVENOUS; SUBCUTANEOUS at 21:00

## 2018-07-03 RX ADMIN — Medication 1000 MILLIGRAM(S): at 21:00

## 2018-07-03 NOTE — ED PROVIDER NOTE - SHIFT CHANGE DETAILS
I have received sign out on this patient, briefly: 84yo F with h/o CKD Dementia HTN right chronic knee effusion presenting with pain in the left ankle s/p twisting left ankle 2 days ago, no reported fall; Prior XRay at OSH initially negative, patient then called back today and directed to come here for CT for possible fracture.  Neurovascularly intact; +swelling, no gross deformity on exam.  Currently awaiting repeat Xrays and CT of left ankle.  -Timothy

## 2018-07-03 NOTE — H&P ADULT - FAMILY HISTORY
Sibling  Still living? Unknown  Family history of acute myocardial infarction, Age at diagnosis: Age Unknown  Family history of bladder cancer, Age at diagnosis: Age Unknown

## 2018-07-03 NOTE — ED PROVIDER NOTE - PROGRESS NOTE DETAILS
CT prelim results note multiple fractures of the left ankle: Acute nondisplaced fractures of the medial and lateral malleoli and intra-articular fractures through the tibial plafond, as well as a moderate joint effusion.  Will consult ortho.  Results d/w family; family is concerned that they are having trouble caring for her at home; has home aid to do cooking/cleaning but no health aid for assistance with movement/transferring or other healthcare needs.  Likely to need admission for rehab placement, will d/w ortho if surgery needed or if patient is candidate for surgery. Miranda Patient seen by ortho and ankle casted. Repeat XR pending. Patient is not operative. Will admit for placement given unable to ambulate and patient has limited services at home, will likely need placement in [facility]; will admit to medicine service, labs obtained, cbc cmp non-actionable, Cr 4 (approx baseline).  UA and culture sent however, known to be chronically colonized from indwelling roque, will not initiate treatment for any UTI at present pending cultures or other evidence of active infection. -Timothy given unable to ambulate and patient has limited services at home, will likely need placement in [facility]; will admit to medicine service, labs obtained, cbc cmp non-actionable, Cr 4 (approx baseline), Hgb 8.9 likely chronic (variable baseline Hgb in EMR, 8.0-10 over last few years, no current symptoms or report of melana/dark stools/brbpr).  UA and culture sent however, known to be chronically colonized from indwelling roque, will not initiate treatment for any UTI at present pending cultures or other evidence of active infection. Miranda

## 2018-07-03 NOTE — ED PROVIDER NOTE - OBJECTIVE STATEMENT
83 F h/o dementia, CKD, htn, neurogenic bladder with indwelling roque, R chronic knee infection, p/w possible fracture of L ankle. 2 days ago patient twisted the ankle while coming out of her car. No fall but had pain of the ankle. Went to OSH, had Xrays and were told they were neg for fracture. But today received a call that there may be a fracture and needs to come to ED for CT. Patient does have pain worse with ambulation, and difficulty ambulating 2/2 pain. Taking tylenol with some relief. +swelling. No numbness.

## 2018-07-03 NOTE — H&P ADULT - PROBLEM SELECTOR PLAN 1
nonweight bearing for now  s/p splint  appreciate ortho evaluation: no surgical intervention per ortho  pain control  PT evaluation

## 2018-07-03 NOTE — ED PROVIDER NOTE - MUSCULOSKELETAL MINIMAL EXAM
L foot +swelling and ecchymosis diffusely. Tenderness to bilat malleolus. Diff with active ROM and pain with passive ROM. +doppler pulse

## 2018-07-03 NOTE — H&P ADULT - PROBLEM SELECTOR PLAN 2
unclear baseline, creatinine ranges from 3.7 to 4.2 as per   last creatinine 3.74 on 6/6/18  will continue to monitor closely  currently does not appear to be dehydrated  avoid nephrotoxic agents unclear baseline, creatinine ranges from 3.7 to 4.2 as per   last creatinine 3.74 on 6/6/18  US kidney 6/1/18 show echogenic cortex b/l kidney  will continue to monitor closely  currently does not appear to be dehydrated  avoid nephrotoxic agents  check urine lytes unclear baseline, creatinine ranges from 3.7 to 4.2 as per   last creatinine 3.74 on 6/6/18  US kidney 6/1/18 show echogenic cortex b/l kidney  will continue to monitor closely  currently does not appear to be dehydrated  avoid nephrotoxic agents  check urine lytes  US kidney if creatinine does not improve  gentle hydration  consider nephrology evaluation

## 2018-07-03 NOTE — ED ADULT NURSE NOTE - CHIEF COMPLAINT QUOTE
R ankle fx s/p trip and fall 2days ago - seen and treated at [place], confirmed via XRay @ [place]

## 2018-07-03 NOTE — H&P ADULT - NSHPREVIEWOFSYSTEMS_GEN_ALL_CORE
ROS limited due to clinical condition    CONSTITUTIONAL: No weakness, fevers or chills  EYES/ENT: No visual changes;    NECK: No pain or stiffness  RESPIRATORY: No cough, No shortness of breath  CARDIOVASCULAR: No chest pain   EXTREMITIES: +swelling of left ankle; chronic swellling of right knee; +chronic wound of right leg  MUSCULOSKELETAL: left ankle joint pain  GASTROINTESTINAL: No abdominal or epigastric pain. No nausea, vomiting   BACK: No back pain  GENITOURINARY: chronic roque in place  NEUROLOGICAL: No numbness or weakness  SKIN: No itching, burning, rashes, or lesions   PSYCH: no agitation  All other review of systems is negative unless indicated above.

## 2018-07-03 NOTE — H&P ADULT - ASSESSMENT
84 yo F, with PMH of CKD, Dementia, with behavioral disturbances, vertigo, neurogenic bladder w/chronic roque, HTN, right knee periprosthetic fracture, chronic right leg wound, BIBEMS for left knee pain, found to have multiple acute nondisplaced fractures.

## 2018-07-03 NOTE — H&P ADULT - HISTORY OF PRESENT ILLNESS
84 yo F, with PMH of CKD, Dementia, with behavioral disturbances, vertigo, neurogenic bladder w/chronic roque, HTN, right knee periprosthetic fracture, chronic right leg wound, BIBEMS for left knee pain and fracture.  Patient is a poor historian, therefore history obtained from son, Eusebio.  Per son, on Saturday patient tried to get out of the car to get on her wheelchair and she twisted her ankle.  She did not fall, but continued to have pain.  She was taken to [facility], where she had XR done and was told it was a sprain and there was no fracture.  The next two days she continued to have pain and swelling.  She was unable to bear any weight.  At baseline, she uses a walker at home and is only able to stand for transfer purposes.  She was only managed with tylenol for her pain.  She otherwise had no complaints of dizziness, blurry vision, chest pain, palpitation, fever, cough, SOB, abdominal pain.  She had no numbness or tingling of lower extremities.  Today son received a call from [place] stating that after further review, XR does suggest a fracture, therefore son brought patient to [facility].  Currently on my evaluation, patient denies any pain, though she received tylenol and dilaudid in ED.  She had CT scan done which showed acute nondisplaced fracture of medial and lateral malleoli.

## 2018-07-03 NOTE — ED PROVIDER NOTE - ATTENDING CONTRIBUTION TO CARE
Patient is an 84 yo F with hx of dementia, CKD, HTN, neurogenic bladder here for evaluation of possible left ankle fracture. Patient's  states patient twisted her ankle. They went to [facility] 2 days ago, had negative XR, called back today to state might be a fracture. Patient is an 82 yo F with hx of dementia, CKD, HTN, neurogenic bladder here for evaluation of possible left ankle fracture. Patient's  states patient twisted her ankle. They went to [facility] 2 days ago, had negative XR, called back today to state might be a fracture. Patient unable to provide reliable history. Per son, patient does not walk at baseline but does transition with assistance.   VS noted  Gen. no acute distress, chronically ill  HEENT: EOMI, mmm,   Lungs: CTAB/L no C/ W /R   CVS: RRR   Abd; Soft non tender, non distended   Ext: chronic ulcer below right knee, left ankle, swollen, ttp in lateral and medial mallelus, + DP pulse.   Neuro awake non focal clear speech  a/p: left ankle fracture by history and exam - XR/ CT, labs, likely ortho consult.   - Coby PAPPAS

## 2018-07-03 NOTE — H&P ADULT - NSHPLABSRESULTS_GEN_ALL_CORE
Labs personally reviewed:                          8.9    9.2   )-----------( 354      ( 2018 19:07 )             28.5     07-03    136  |  101  |  48<H>  ----------------------------<  115<H>  5.1   |  23  |  4.00<H>    Ca    9.0      2018 19:07    TPro  8.0  /  Alb  3.4  /  TBili  0.2  /  DBili  x   /  AST  16  /  ALT  11  /  AlkPhos  94  -        LIVER FUNCTIONS - ( 2018 19:07 )  Alb: 3.4 g/dL / Pro: 8.0 g/dL / ALK PHOS: 94 U/L / ALT: 11 U/L / AST: 16 U/L / GGT: x           PT/INR - ( 2018 19:07 )   PT: 11.3 sec;   INR: 1.05 ratio         PTT - ( 2018 19:07 )  PTT:31.8 sec  Urinalysis Basic - ( 2018 21:09 )    Color: PL Yellow / Appearance: SL Turbid / S.012 / pH: x  Gluc: x / Ketone: Negative  / Bili: Negative / Urobili: Negative   Blood: x / Protein: 100 mg/dL / Nitrite: Negative   Leuk Esterase: Large / RBC: 0-2 /HPF / WBC 6-10 /HPF   Sq Epi: x / Non Sq Epi: x / Bacteria: x      CAPILLARY BLOOD GLUCOSE          Imaging:  XR ankle and CT foot reviewed: minimally displaced acute fracure of the medial and lateral malleoli; nondisplaced intrarticular fracture through tibial plafond; ? irregularity of post malleolus may represent acute fracture; moderate joint effusion.     EKG personally reviewed: nsr, no acute st changes

## 2018-07-03 NOTE — ED ADULT NURSE REASSESSMENT NOTE - NS ED NURSE REASSESS COMMENT FT1
pt has 16 FR, 30cc balloon Chris indwelling cath in place from home; bag changed in sterile fashion; will collect urine specs after bag changed

## 2018-07-03 NOTE — H&P ADULT - NSHPPHYSICALEXAM_GEN_ALL_CORE
PHYSICAL EXAM:  Vital Signs Last 24 Hrs  T(C): 36.9 (07-03-18 @ 21:52)  T(F): 98.4 (07-03-18 @ 21:52), Max: 98.4 (07-03-18 @ 21:52)  HR: 84 (07-03-18 @ 21:52) (79 - 88)  BP: 155/95 (07-03-18 @ 21:52)  BP(mean): --  RR: 16 (07-03-18 @ 21:52) (16 - 16)  SpO2: 96% (07-03-18 @ 21:52) (95% - 96%)  Wt(kg): --    07-03 @ 07:01  -  07-03 @ 22:07  --------------------------------------------------------  IN: 0 mL / OUT: 300 mL / NET: -300 mL      Constitutional: NAD, awake and alert  EYES: EOMI  ENT:  Normal Hearing, no tonsillar exudates   Neck: Soft and supple, No JVD  Lungs: Breath sounds are clear bilaterally, No wheezing, rales or rhonchi  Heart: S1 and S2, regular rate and rhythm, no Murmurs, gallops or rubs  Abdomen: Bowel Sounds present, soft, nontender, nondistended, no guarding, no rebound  Extremities: No cyanosis or clubbing; warm to touch  Vascular: doppler detected peripheral pulses lower ex in ED  Neurological: A/O x 2 (baseline per son); no focal deficits  Musculoskeletal: 5/5 strength b/l upper; left foot edema; s/p splint  Skin: right leg open ulcer (chronic and no change per family)  Psych: +dementia  HEME: no bruises, no nose bleeds

## 2018-07-03 NOTE — CONSULT NOTE ADULT - SUBJECTIVE AND OBJECTIVE BOX
83yFemale c/o L ankle pain s/p a twisting injury after getting out of a car. Patient denies head hit or LOC. Patient denies numbness or tingling in the DOMINIC. Patient denies any other injuries.     Per Son, patient is minimal ambulator, and only stands for transfers    PMH:  Hyperlipidemia  Ventral hernia without obstruction or gangrene  Dementia with behavioral disturbance, unspecified dementia type  Knee effusion, right  Dementia  HTN (hypertension)  Neurogenic bladder disorder  CKD (chronic kidney disease)    PSH:  History of bladder surgery  History of lumbar fusion  Status post total bilateral knee replacement  No significant past surgical history  No significant past surgical history    AH:    Meds: See med rec    T(C): 36.8 (07-03-18 @ 19:13)  HR: 79 (07-03-18 @ 20:09)  BP: 174/82 (07-03-18 @ 20:09)  RR: 16 (07-03-18 @ 19:13)  SpO2: 95% (07-03-18 @ 19:13)  Wt(kg): --      Gen: NAD, alert and oriented  Resp: Unlabored breathing  LLE: Skin intact, ecchymosis, over lateral mal       SILT DP/SP/ Jm/Saph,        +EHL/FHL/TA/Gastroc,        DP+,        soft compartments, no calf ttp,

## 2018-07-03 NOTE — ED PROVIDER NOTE - MEDICAL DECISION MAKING DETAILS
83 F with possible fracture to L foot.  +swelling. Doppler pulse. Tenderness bilat ankle. Will get XR/CT

## 2018-07-03 NOTE — CONSULT NOTE ADULT - ASSESSMENT
83F sp twisting injury to left ankle. Placed in splint    ·	pain control, elevation, ice  ·	NWB LLE  ·	PT/OT  ·	No acute ortho intervention  ·	Please page back as needed if you have questions    Ortho [id number]

## 2018-07-03 NOTE — ED PROVIDER NOTE - CARE PLAN
Principal Discharge DX:	Ankle fracture, left, closed, initial encounter Principal Discharge DX:	Ankle fracture, left, closed, initial encounter  Goal:	Comminuted distal tibial fracture with a transverse nondisplaced distal - left

## 2018-07-03 NOTE — ED ADULT NURSE NOTE - OBJECTIVE STATEMENT
pt biba with family at bedside for f/u to left ankle fx that she sustained 2 days ago.  per ems, pt twisted her ankle and sought tx at [facility].  they sent her home, but then radiologist found a fx to the left ankle.  pt is awake and responsive to all stimuli but demented.  no sob or respiratory distress noted at this time.  pt currently being worked up and is awaiting md reeval.  will continue to monitor. pt biba with family at bedside for f/u to left ankle fx that she sustained 2 days ago.  per ems, pt twisted her ankle and sought tx at [facility].  they sent her home, but then radiologist found a fx to the left ankle.  pt is awake and responsive to all stimuli but demented.  no sob or respiratory distress noted at this time.  f/c in place from home for neurogenic bladder, per family.  pt currently being worked up and is awaiting md reeval.  will continue to monitor.

## 2018-07-03 NOTE — ED ADULT NURSE REASSESSMENT NOTE - NS ED NURSE REASSESS COMMENT FT1
pt briefly c/o itching ; pt received dilaudid earlier; dr kumari aware and evaluated pt; itching subsided; also, stat lock placed for roque cath

## 2018-07-04 LAB
ALBUMIN SERPL ELPH-MCNC: 3.1 G/DL — LOW (ref 3.3–5)
ALP SERPL-CCNC: 87 U/L — SIGNIFICANT CHANGE UP (ref 40–120)
ALT FLD-CCNC: 10 U/L — SIGNIFICANT CHANGE UP (ref 10–45)
ANION GAP SERPL CALC-SCNC: 15 MMOL/L — SIGNIFICANT CHANGE UP (ref 5–17)
AST SERPL-CCNC: 14 U/L — SIGNIFICANT CHANGE UP (ref 10–40)
BASOPHILS # BLD AUTO: 0.02 K/UL — SIGNIFICANT CHANGE UP (ref 0–0.2)
BASOPHILS NFR BLD AUTO: 0.3 % — SIGNIFICANT CHANGE UP (ref 0–2)
BILIRUB SERPL-MCNC: 0.2 MG/DL — SIGNIFICANT CHANGE UP (ref 0.2–1.2)
BUN SERPL-MCNC: 49 MG/DL — HIGH (ref 7–23)
CALCIUM SERPL-MCNC: 9.2 MG/DL — SIGNIFICANT CHANGE UP (ref 8.4–10.5)
CHLORIDE SERPL-SCNC: 101 MMOL/L — SIGNIFICANT CHANGE UP (ref 96–108)
CO2 SERPL-SCNC: 23 MMOL/L — SIGNIFICANT CHANGE UP (ref 22–31)
CREAT ?TM UR-MCNC: 67 MG/DL — SIGNIFICANT CHANGE UP
CREAT SERPL-MCNC: 4.32 MG/DL — HIGH (ref 0.5–1.3)
CULTURE RESULTS: SIGNIFICANT CHANGE UP
EOSINOPHIL # BLD AUTO: 0.36 K/UL — SIGNIFICANT CHANGE UP (ref 0–0.5)
EOSINOPHIL NFR BLD AUTO: 4.7 % — SIGNIFICANT CHANGE UP (ref 0–6)
GLUCOSE SERPL-MCNC: 96 MG/DL — SIGNIFICANT CHANGE UP (ref 70–99)
HCT VFR BLD CALC: 27.9 % — LOW (ref 34.5–45)
HGB BLD-MCNC: 8.1 G/DL — LOW (ref 11.5–15.5)
IMM GRANULOCYTES NFR BLD AUTO: 1.6 % — HIGH (ref 0–1.5)
LYMPHOCYTES # BLD AUTO: 2.23 K/UL — SIGNIFICANT CHANGE UP (ref 1–3.3)
LYMPHOCYTES # BLD AUTO: 28.9 % — SIGNIFICANT CHANGE UP (ref 13–44)
MAGNESIUM SERPL-MCNC: 2.5 MG/DL — SIGNIFICANT CHANGE UP (ref 1.6–2.6)
MCHC RBC-ENTMCNC: 25.5 PG — LOW (ref 27–34)
MCHC RBC-ENTMCNC: 29 GM/DL — LOW (ref 32–36)
MCV RBC AUTO: 87.7 FL — SIGNIFICANT CHANGE UP (ref 80–100)
MONOCYTES # BLD AUTO: 0.7 K/UL — SIGNIFICANT CHANGE UP (ref 0–0.9)
MONOCYTES NFR BLD AUTO: 9.1 % — SIGNIFICANT CHANGE UP (ref 2–14)
NEUTROPHILS # BLD AUTO: 4.29 K/UL — SIGNIFICANT CHANGE UP (ref 1.8–7.4)
NEUTROPHILS NFR BLD AUTO: 55.4 % — SIGNIFICANT CHANGE UP (ref 43–77)
OSMOLALITY UR: 388 MOS/KG — SIGNIFICANT CHANGE UP (ref 300–900)
PHOSPHATE SERPL-MCNC: 4.7 MG/DL — HIGH (ref 2.5–4.5)
PLATELET # BLD AUTO: 361 K/UL — SIGNIFICANT CHANGE UP (ref 150–400)
POTASSIUM SERPL-MCNC: 5.1 MMOL/L — SIGNIFICANT CHANGE UP (ref 3.5–5.3)
POTASSIUM SERPL-SCNC: 5.1 MMOL/L — SIGNIFICANT CHANGE UP (ref 3.5–5.3)
POTASSIUM UR-SCNC: 30 MMOL/L — SIGNIFICANT CHANGE UP
PROT SERPL-MCNC: 7.9 G/DL — SIGNIFICANT CHANGE UP (ref 6–8.3)
RBC # BLD: 3.18 M/UL — LOW (ref 3.8–5.2)
RBC # FLD: 16.4 % — HIGH (ref 10.3–14.5)
SODIUM SERPL-SCNC: 139 MMOL/L — SIGNIFICANT CHANGE UP (ref 135–145)
SODIUM UR-SCNC: 84 MMOL/L — SIGNIFICANT CHANGE UP
SPECIMEN SOURCE: SIGNIFICANT CHANGE UP
WBC # BLD: 7.72 K/UL — SIGNIFICANT CHANGE UP (ref 3.8–10.5)
WBC # FLD AUTO: 7.72 K/UL — SIGNIFICANT CHANGE UP (ref 3.8–10.5)

## 2018-07-04 PROCEDURE — 99233 SBSQ HOSP IP/OBS HIGH 50: CPT

## 2018-07-04 RX ORDER — SODIUM CHLORIDE 9 MG/ML
1000 INJECTION INTRAMUSCULAR; INTRAVENOUS; SUBCUTANEOUS
Qty: 0 | Refills: 0 | Status: DISCONTINUED | OUTPATIENT
Start: 2018-07-04 | End: 2018-07-05

## 2018-07-04 RX ADMIN — Medication 650 MILLIGRAM(S): at 06:11

## 2018-07-04 RX ADMIN — Medication 50 MILLIGRAM(S): at 06:11

## 2018-07-04 RX ADMIN — MIRTAZAPINE 15 MILLIGRAM(S): 45 TABLET, ORALLY DISINTEGRATING ORAL at 21:16

## 2018-07-04 RX ADMIN — HEPARIN SODIUM 5000 UNIT(S): 5000 INJECTION INTRAVENOUS; SUBCUTANEOUS at 13:28

## 2018-07-04 RX ADMIN — HEPARIN SODIUM 5000 UNIT(S): 5000 INJECTION INTRAVENOUS; SUBCUTANEOUS at 21:16

## 2018-07-04 RX ADMIN — PANTOPRAZOLE SODIUM 40 MILLIGRAM(S): 20 TABLET, DELAYED RELEASE ORAL at 06:11

## 2018-07-04 RX ADMIN — SODIUM CHLORIDE 75 MILLILITER(S): 9 INJECTION INTRAMUSCULAR; INTRAVENOUS; SUBCUTANEOUS at 08:21

## 2018-07-04 RX ADMIN — Medication 325 MILLIGRAM(S): at 17:34

## 2018-07-04 RX ADMIN — Medication 50 MILLIGRAM(S): at 17:34

## 2018-07-04 RX ADMIN — SIMVASTATIN 20 MILLIGRAM(S): 20 TABLET, FILM COATED ORAL at 21:16

## 2018-07-04 RX ADMIN — Medication 325 MILLIGRAM(S): at 06:11

## 2018-07-04 RX ADMIN — Medication 650 MILLIGRAM(S): at 17:34

## 2018-07-04 RX ADMIN — HEPARIN SODIUM 5000 UNIT(S): 5000 INJECTION INTRAVENOUS; SUBCUTANEOUS at 06:11

## 2018-07-04 RX ADMIN — MIRABEGRON 25 MILLIGRAM(S): 50 TABLET, EXTENDED RELEASE ORAL at 11:23

## 2018-07-05 ENCOUNTER — TRANSCRIPTION ENCOUNTER (OUTPATIENT)
Age: 83
End: 2018-07-05

## 2018-07-05 LAB
ANION GAP SERPL CALC-SCNC: 15 MMOL/L — SIGNIFICANT CHANGE UP (ref 5–17)
BUN SERPL-MCNC: 50 MG/DL — HIGH (ref 7–23)
CALCIUM SERPL-MCNC: 9.2 MG/DL — SIGNIFICANT CHANGE UP (ref 8.4–10.5)
CHLORIDE SERPL-SCNC: 102 MMOL/L — SIGNIFICANT CHANGE UP (ref 96–108)
CO2 SERPL-SCNC: 21 MMOL/L — LOW (ref 22–31)
CREAT SERPL-MCNC: 3.82 MG/DL — HIGH (ref 0.5–1.3)
GLUCOSE SERPL-MCNC: 87 MG/DL — SIGNIFICANT CHANGE UP (ref 70–99)
HCT VFR BLD CALC: 26.9 % — LOW (ref 34.5–45)
HGB BLD-MCNC: 8.3 G/DL — LOW (ref 11.5–15.5)
MCHC RBC-ENTMCNC: 26.4 PG — LOW (ref 27–34)
MCHC RBC-ENTMCNC: 30.9 GM/DL — LOW (ref 32–36)
MCV RBC AUTO: 85.7 FL — SIGNIFICANT CHANGE UP (ref 80–100)
PLATELET # BLD AUTO: 374 K/UL — SIGNIFICANT CHANGE UP (ref 150–400)
POTASSIUM SERPL-MCNC: 4.8 MMOL/L — SIGNIFICANT CHANGE UP (ref 3.5–5.3)
POTASSIUM SERPL-SCNC: 4.8 MMOL/L — SIGNIFICANT CHANGE UP (ref 3.5–5.3)
RBC # BLD: 3.14 M/UL — LOW (ref 3.8–5.2)
RBC # FLD: 16.9 % — HIGH (ref 10.3–14.5)
SODIUM SERPL-SCNC: 138 MMOL/L — SIGNIFICANT CHANGE UP (ref 135–145)
WBC # BLD: 7.41 K/UL — SIGNIFICANT CHANGE UP (ref 3.8–10.5)
WBC # FLD AUTO: 7.41 K/UL — SIGNIFICANT CHANGE UP (ref 3.8–10.5)

## 2018-07-05 PROCEDURE — 99233 SBSQ HOSP IP/OBS HIGH 50: CPT

## 2018-07-05 RX ORDER — DOCUSATE SODIUM 100 MG
100 CAPSULE ORAL THREE TIMES A DAY
Qty: 0 | Refills: 0 | Status: DISCONTINUED | OUTPATIENT
Start: 2018-07-05 | End: 2018-07-06

## 2018-07-05 RX ORDER — SENNA PLUS 8.6 MG/1
2 TABLET ORAL AT BEDTIME
Qty: 0 | Refills: 0 | Status: DISCONTINUED | OUTPATIENT
Start: 2018-07-05 | End: 2018-07-06

## 2018-07-05 RX ADMIN — HEPARIN SODIUM 5000 UNIT(S): 5000 INJECTION INTRAVENOUS; SUBCUTANEOUS at 21:02

## 2018-07-05 RX ADMIN — Medication 325 MILLIGRAM(S): at 05:15

## 2018-07-05 RX ADMIN — Medication 650 MILLIGRAM(S): at 17:34

## 2018-07-05 RX ADMIN — Medication 650 MILLIGRAM(S): at 05:14

## 2018-07-05 RX ADMIN — SIMVASTATIN 20 MILLIGRAM(S): 20 TABLET, FILM COATED ORAL at 21:02

## 2018-07-05 RX ADMIN — MIRTAZAPINE 15 MILLIGRAM(S): 45 TABLET, ORALLY DISINTEGRATING ORAL at 19:57

## 2018-07-05 RX ADMIN — SENNA PLUS 2 TABLET(S): 8.6 TABLET ORAL at 21:02

## 2018-07-05 RX ADMIN — PANTOPRAZOLE SODIUM 40 MILLIGRAM(S): 20 TABLET, DELAYED RELEASE ORAL at 05:15

## 2018-07-05 RX ADMIN — Medication 50 MILLIGRAM(S): at 05:15

## 2018-07-05 RX ADMIN — HEPARIN SODIUM 5000 UNIT(S): 5000 INJECTION INTRAVENOUS; SUBCUTANEOUS at 05:15

## 2018-07-05 RX ADMIN — Medication 50 MILLIGRAM(S): at 17:34

## 2018-07-05 RX ADMIN — Medication 325 MILLIGRAM(S): at 17:34

## 2018-07-05 RX ADMIN — HEPARIN SODIUM 5000 UNIT(S): 5000 INJECTION INTRAVENOUS; SUBCUTANEOUS at 13:07

## 2018-07-05 RX ADMIN — MIRABEGRON 25 MILLIGRAM(S): 50 TABLET, EXTENDED RELEASE ORAL at 13:07

## 2018-07-05 RX ADMIN — Medication 100 MILLIGRAM(S): at 21:04

## 2018-07-05 RX ADMIN — Medication 100 MILLIGRAM(S): at 13:07

## 2018-07-05 NOTE — DISCHARGE NOTE ADULT - PATIENT PORTAL LINK FT
You can access the [facility] Portal, offered by [facility], by registering with the following website: [email or web address]

## 2018-07-05 NOTE — PHYSICAL THERAPY INITIAL EVALUATION ADULT - ADDITIONAL COMMENTS
History provided by . Pt lives with  in son in private home, pt is able to amb short distances in the home, chair lift to second floor. Per , there is a 24/7 HHA who does not assist with functional mobility, pt is usually able to stand and transfer herself and get out of bed herself.

## 2018-07-05 NOTE — PHYSICAL THERAPY INITIAL EVALUATION ADULT - PERTINENT HX OF CURRENT PROBLEM, REHAB EVAL
82 yo F with dementia presents after twisting ankle coming out of chair. No fall but had pain of the ankle. Went to OSH, had Xrays and were told they were neg for fx.  But today received a call that there may be a fracture and needs to come to ED for CT. CT Left foot 7/3: Comminuted fracture the distal tibia. Nondisplaced transverse fracture of

## 2018-07-05 NOTE — DISCHARGE NOTE ADULT - HOSPITAL COURSE
83 yoF, with PMH of CKD4, Dementia, with behavioral disturbances, vertigo, neurogenic bladder w/chronic roque, HTN, right knee periprosthetic fracture, chronic right leg wound, BIBEMS for left ankle pain, found to have acute comminuted fracture of the distal tibia and a nondisplaced transverse fracture of the distal fibula s/p splint by ortho. Patient is NWB. PT recommended rehab. Outpatient follow up with Orthopedic. 83 yoF, with PMH of CKD4, Dementia, with behavioral disturbances, vertigo, neurogenic bladder w/chronic roque, HTN, chronic right leg wound, BIBEMS for left ankle pain, found to have acute comminuted fracture of the distal tibia and a nondisplaced transverse fracture of the distal fibula s/p splint by ortho. Patient is NWB. PT recommended rehab. Outpatient follow up with Orthopedic.

## 2018-07-05 NOTE — PHYSICAL THERAPY INITIAL EVALUATION ADULT - TRANSFER SAFETY CONCERNS NOTED: SIT/STAND, REHAB EVAL
decreased weight-shifting ability/decreased safety awareness/losing balance/inability to maintain weight-bearing restrictions w/o assist

## 2018-07-05 NOTE — DISCHARGE NOTE ADULT - CARE PLAN
Principal Discharge DX:	Ankle fracture, left, closed, initial encounter  Assessment and plan of treatment:	Physical therapy in rehab  Follow up with Orthopedics in 1 week-Dr. Roman  No weight bearing on Left lower extremity  Secondary Diagnosis:	CKD (chronic kidney disease)  Assessment and plan of treatment:	Avoid taking (NSAIDs) - (ex: Ibuprofen, Advil, Celebrex, Naprosyn)  Avoid taking any nephrotoxic agents (can harm kidneys) - Intravenous contrast for diagnostic testing, combination cold medications.  Have all medications adjusted for your renal function by your Health Care Provider.  Blood pressure control is important.  Take all medication as prescribed.  Secondary Diagnosis:	Dementia  Assessment and plan of treatment:	SEEK IMMEDIATE MEDICAL CARE IF:  A fever develops.  New or worsened confusion develops.  New or worsened sleepiness develops.  Staying awake becomes hard to do.  Secondary Diagnosis:	HTN (hypertension)  Assessment and plan of treatment:	Low salt diet  Activity as tolerated.  Take all medication as prescribed.  Follow up with your medical doctor for routine blood pressure monitoring at your next visit.  Notify your doctor if you have any of the following symptoms:   Dizziness, Lightheadedness, Blurry vision, Headache, Chest pain, Shortness of breath Principal Discharge DX:	Ankle fracture, left, closed, initial encounter  Goal:	fracture heals/general function improves in rehab  Assessment and plan of treatment:	Physical therapy in rehab  Follow up with Orthopedics in 1 week-Dr. Roman  No weight bearing on Left lower extremity  Secondary Diagnosis:	CKD (chronic kidney disease)  Assessment and plan of treatment:	Avoid taking (NSAIDs) - (ex: Ibuprofen, Advil, Celebrex, Naprosyn)  Avoid taking any nephrotoxic agents (can harm kidneys) - Intravenous contrast for diagnostic testing, combination cold medications.  Have all medications adjusted for your renal function by your Health Care Provider.  Blood pressure control is important.  Take all medication as prescribed.  Secondary Diagnosis:	Dementia  Assessment and plan of treatment:	SEEK IMMEDIATE MEDICAL CARE IF:  A fever develops.  New or worsened confusion develops.  New or worsened sleepiness develops.  Staying awake becomes hard to do.  Secondary Diagnosis:	HTN (hypertension)  Assessment and plan of treatment:	Low salt diet  Activity as tolerated.  Take all medication as prescribed.  Follow up with your medical doctor for routine blood pressure monitoring at your next visit.  Notify your doctor if you have any of the following symptoms:   Dizziness, Lightheadedness, Blurry vision, Headache, Chest pain, Shortness of breath

## 2018-07-05 NOTE — DISCHARGE NOTE ADULT - PLAN OF CARE
Physical therapy in rehab  Follow up with Orthopedics in 1 week-Dr. Roman  No weight bearing on Left lower extremity Avoid taking (NSAIDs) - (ex: Ibuprofen, Advil, Celebrex, Naprosyn)  Avoid taking any nephrotoxic agents (can harm kidneys) - Intravenous contrast for diagnostic testing, combination cold medications.  Have all medications adjusted for your renal function by your Health Care Provider.  Blood pressure control is important.  Take all medication as prescribed. SEEK IMMEDIATE MEDICAL CARE IF:  A fever develops.  New or worsened confusion develops.  New or worsened sleepiness develops.  Staying awake becomes hard to do. Low salt diet  Activity as tolerated.  Take all medication as prescribed.  Follow up with your medical doctor for routine blood pressure monitoring at your next visit.  Notify your doctor if you have any of the following symptoms:   Dizziness, Lightheadedness, Blurry vision, Headache, Chest pain, Shortness of breath fracture heals/general function improves in rehab

## 2018-07-05 NOTE — DISCHARGE NOTE ADULT - MEDICATION SUMMARY - MEDICATIONS TO TAKE
I will START or STAY ON the medications listed below when I get home from the hospital:    Tylenol 325 mg oral tablet  -- 2 tab(s) by mouth every 6 hours, As Needed  pain  -- Indication: For Pain    sodium bicarbonate 650 mg oral tablet  -- 1 tab(s) by mouth 2 times a day  -- Indication: For Dyspepsia    mirtazapine 15 mg oral tablet  -- 1 tab(s) by mouth once a day (at bedtime)  -- Indication: For Depression    simvastatin 20 mg oral tablet  -- 1 tab(s) by mouth once a day (at bedtime)  -- Indication: For High cholesterol    metoprolol tartrate 50 mg oral tablet  -- 1 tab(s) by mouth 2 times a day  -- Indication: For HTN (hypertension)    ProAir HFA 90 mcg/inh inhalation aerosol  -- 2 puff(s) inhaled 4 times a day, As Needed -for shortness of breath and/or wheezing   -- For inhalation only.  It is very important that you take or use this exactly as directed.  Do not skip doses or discontinue unless directed by your doctor.  Obtain medical advice before taking any non-prescription drugs as some may affect the action of this medication.  Shake well before use.    -- Indication: For shortness of breath    epoetin marco  -- 13339 unit(s) subcutaneous once a month  -- Indication: For Anemia    ferrous sulfate 325 mg (65 mg elemental iron) oral tablet  -- 1 tab(s) by mouth 2 times a day  -- Indication: For Anemia    docusate sodium 100 mg oral capsule  -- 1 cap(s) by mouth 3 times a day  -- Indication: For Constipation    senna oral tablet  -- 2 tab(s) by mouth once a day (at bedtime)  -- Indication: For Constipation    pantoprazole 40 mg oral delayed release tablet  -- 1 tab(s) by mouth once a day 1/2 hour before breakfast  -- Indication: For Acid reflux    Myrbetriq 25 mg oral tablet, extended release  -- 1 tab(s) by mouth once a day  -- Indication: For Neurogenic bladder disorder I will START or STAY ON the medications listed below when I get home from the hospital:    Tylenol 325 mg oral tablet  -- 2 tab(s) by mouth every 6 hours, As Needed  pain  -- Indication: For Pain    sodium bicarbonate 650 mg oral tablet  -- 1 tab(s) by mouth 2 times a day  -- Indication: For Dyspepsia    mirtazapine 15 mg oral tablet  -- 1 tab(s) by mouth once a day (at bedtime)  -- Indication: For Depression    simvastatin 20 mg oral tablet  -- 1 tab(s) by mouth once a day (at bedtime)  -- Indication: For High cholesterol    metoprolol tartrate 50 mg oral tablet  -- 1 tab(s) by mouth 2 times a day  -- Indication: For HTN (hypertension)    ProAir HFA 90 mcg/inh inhalation aerosol  -- 2 puff(s) inhaled 4 times a day, As Needed -for shortness of breath and/or wheezing   -- For inhalation only.  It is very important that you take or use this exactly as directed.  Do not skip doses or discontinue unless directed by your doctor.  Obtain medical advice before taking any non-prescription drugs as some may affect the action of this medication.  Shake well before use.    -- Indication: For shortness of breath    amLODIPine 5 mg oral tablet  -- 1 tab(s) by mouth once a day  -- Indication: For HTN (hypertension)    epoetin marco  -- 16238 unit(s) subcutaneous once a month  -- Indication: For Anemia    ferrous sulfate 325 mg (65 mg elemental iron) oral tablet  -- 1 tab(s) by mouth 2 times a day  -- Indication: For Anemia    docusate sodium 100 mg oral capsule  -- 1 cap(s) by mouth 3 times a day  -- Indication: For Constipation    senna oral tablet  -- 2 tab(s) by mouth once a day (at bedtime)  -- Indication: For Constipation    pantoprazole 40 mg oral delayed release tablet  -- 1 tab(s) by mouth once a day 1/2 hour before breakfast  -- Indication: For Acid reflux    Myrbetriq 25 mg oral tablet, extended release  -- 1 tab(s) by mouth once a day  -- Indication: For Neurogenic bladder disorder

## 2018-07-06 VITALS — SYSTOLIC BLOOD PRESSURE: 168 MMHG | DIASTOLIC BLOOD PRESSURE: 94 MMHG

## 2018-07-06 PROCEDURE — 86901 BLOOD TYPING SEROLOGIC RH(D): CPT

## 2018-07-06 PROCEDURE — 85610 PROTHROMBIN TIME: CPT

## 2018-07-06 PROCEDURE — 99239 HOSP IP/OBS DSCHRG MGMT >30: CPT

## 2018-07-06 PROCEDURE — 80048 BASIC METABOLIC PNL TOTAL CA: CPT

## 2018-07-06 PROCEDURE — 73610 X-RAY EXAM OF ANKLE: CPT

## 2018-07-06 PROCEDURE — 86850 RBC ANTIBODY SCREEN: CPT

## 2018-07-06 PROCEDURE — 76377 3D RENDER W/INTRP POSTPROCES: CPT

## 2018-07-06 PROCEDURE — 96375 TX/PRO/DX INJ NEW DRUG ADDON: CPT

## 2018-07-06 PROCEDURE — 84300 ASSAY OF URINE SODIUM: CPT

## 2018-07-06 PROCEDURE — 97162 PT EVAL MOD COMPLEX 30 MIN: CPT

## 2018-07-06 PROCEDURE — 73630 X-RAY EXAM OF FOOT: CPT

## 2018-07-06 PROCEDURE — 86900 BLOOD TYPING SEROLOGIC ABO: CPT

## 2018-07-06 PROCEDURE — 87086 URINE CULTURE/COLONY COUNT: CPT

## 2018-07-06 PROCEDURE — 83935 ASSAY OF URINE OSMOLALITY: CPT

## 2018-07-06 PROCEDURE — 76770 US EXAM ABDO BACK WALL COMP: CPT | Mod: 26

## 2018-07-06 PROCEDURE — 85027 COMPLETE CBC AUTOMATED: CPT

## 2018-07-06 PROCEDURE — 99285 EMERGENCY DEPT VISIT HI MDM: CPT | Mod: 25

## 2018-07-06 PROCEDURE — 84133 ASSAY OF URINE POTASSIUM: CPT

## 2018-07-06 PROCEDURE — 80053 COMPREHEN METABOLIC PANEL: CPT

## 2018-07-06 PROCEDURE — 83735 ASSAY OF MAGNESIUM: CPT

## 2018-07-06 PROCEDURE — 96374 THER/PROPH/DIAG INJ IV PUSH: CPT

## 2018-07-06 PROCEDURE — 81001 URINALYSIS AUTO W/SCOPE: CPT

## 2018-07-06 PROCEDURE — 73700 CT LOWER EXTREMITY W/O DYE: CPT

## 2018-07-06 PROCEDURE — 93005 ELECTROCARDIOGRAM TRACING: CPT

## 2018-07-06 PROCEDURE — 76770 US EXAM ABDO BACK WALL COMP: CPT

## 2018-07-06 PROCEDURE — 85730 THROMBOPLASTIN TIME PARTIAL: CPT

## 2018-07-06 PROCEDURE — 84100 ASSAY OF PHOSPHORUS: CPT

## 2018-07-06 PROCEDURE — 82570 ASSAY OF URINE CREATININE: CPT

## 2018-07-06 RX ORDER — SENNA PLUS 8.6 MG/1
2 TABLET ORAL
Qty: 0 | Refills: 0 | COMMUNITY
Start: 2018-07-06

## 2018-07-06 RX ORDER — AMLODIPINE BESYLATE 2.5 MG/1
1 TABLET ORAL
Qty: 0 | Refills: 0 | COMMUNITY
Start: 2018-07-06

## 2018-07-06 RX ORDER — DOCUSATE SODIUM 100 MG
1 CAPSULE ORAL
Qty: 0 | Refills: 0 | COMMUNITY
Start: 2018-07-06

## 2018-07-06 RX ORDER — AMLODIPINE BESYLATE 2.5 MG/1
5 TABLET ORAL DAILY
Qty: 0 | Refills: 0 | Status: DISCONTINUED | OUTPATIENT
Start: 2018-07-06 | End: 2018-07-06

## 2018-07-06 RX ORDER — LABETALOL HCL 100 MG
5 TABLET ORAL ONCE
Qty: 0 | Refills: 0 | Status: COMPLETED | OUTPATIENT
Start: 2018-07-06 | End: 2018-07-06

## 2018-07-06 RX ADMIN — Medication 5 MILLIGRAM(S): at 11:40

## 2018-07-06 RX ADMIN — Medication 325 MILLIGRAM(S): at 05:22

## 2018-07-06 RX ADMIN — Medication 100 MILLIGRAM(S): at 05:22

## 2018-07-06 RX ADMIN — MIRABEGRON 25 MILLIGRAM(S): 50 TABLET, EXTENDED RELEASE ORAL at 11:42

## 2018-07-06 RX ADMIN — HEPARIN SODIUM 5000 UNIT(S): 5000 INJECTION INTRAVENOUS; SUBCUTANEOUS at 05:22

## 2018-07-06 RX ADMIN — Medication 650 MILLIGRAM(S): at 05:22

## 2018-07-06 RX ADMIN — Medication 50 MILLIGRAM(S): at 05:22

## 2018-07-06 RX ADMIN — PANTOPRAZOLE SODIUM 40 MILLIGRAM(S): 20 TABLET, DELAYED RELEASE ORAL at 05:26

## 2018-07-06 RX ADMIN — AMLODIPINE BESYLATE 5 MILLIGRAM(S): 2.5 TABLET ORAL at 11:41

## 2018-07-06 NOTE — PROVIDER CONTACT NOTE (OTHER) - ASSESSMENT
pt alert and oriented x 1-2, roque removed, balloon intact, no bleeding noted, pt denies pain. pt alert and oriented x 1-2, pt pulled out roque,  balloon intact, no bleeding noted, pt denies pain.

## 2018-07-06 NOTE — PROVIDER CONTACT NOTE (OTHER) - BACKGROUND
84 y/o female admitted for fracture of left lower leg, hx of neurogenic bladder with chronic roque for retention.

## 2018-07-06 NOTE — PROGRESS NOTE ADULT - PROBLEM SELECTOR PLAN 5
BP elevated - patient is asymptomatic. Will give labetolol 5mg IVx1 now, if BP comes down will discharge to [facility]. Will c/w metoprolol and start norvasc 5mg PO daily for better BP control
c/w metoprolol
c/w metoprolol

## 2018-07-06 NOTE — PROGRESS NOTE ADULT - PROBLEM SELECTOR PLAN 2
unclear baseline, creatinine ranges from 3.7 to 4.2 as per   creatinine at baseline  US kidney 6/1/18 show echogenic cortex b/l kidney  currently does not appear to be dehydrated  avoid nephrotoxic agents  Pt has seen Dr. Urena in the past
unclear baseline, creatinine ranges from 3.7 to 4.2 as per   creatinine at baseline  US kidney 6/1/18 show echogenic cortex b/l kidney  currently does not appear to be dehydrated  avoid nephrotoxic agents  Pt has seen Dr. Urena in the past
unclear baseline, creatinine ranges from 3.7 to 4.2 as per   last creatinine 3.74 on 6/6/18  US kidney 6/1/18 show echogenic cortex b/l kidney  will continue to monitor closely  currently does not appear to be dehydrated  avoid nephrotoxic agents  US kidney if creatinine does not improve  gentle hydration  Pt has seen Dr. Urena in the past

## 2018-07-06 NOTE — PROGRESS NOTE ADULT - SUBJECTIVE AND OBJECTIVE BOX
Patient is a 83y old  Female who presents with a chief complaint of left knee fracture (2018 10:21)      SUBJECTIVE / OVERNIGHT EVENTS: Patient seen and examined at bedside - patient feels well, ankle painful only on movement, urine is normal, BM good.    ROS:  unable to obtain due to dementia    Allergies    No Known Allergies    Intolerances        MEDICATIONS  (STANDING):  ferrous    sulfate 325 milliGRAM(s) Oral two times a day  heparin  Injectable 5000 Unit(s) SubCutaneous every 8 hours  metoprolol tartrate 50 milliGRAM(s) Oral two times a day  mirabegron ER 25 milliGRAM(s) Oral daily  mirtazapine 15 milliGRAM(s) Oral at bedtime  pantoprazole    Tablet 40 milliGRAM(s) Oral before breakfast  simvastatin 20 milliGRAM(s) Oral at bedtime  sodium bicarbonate 650 milliGRAM(s) Oral two times a day    MEDICATIONS  (PRN):  acetaminophen   Tablet 650 milliGRAM(s) Oral every 6 hours PRN pain  ALBUTerol    90 MICROgram(s) HFA Inhaler 2 Puff(s) Inhalation every 6 hours PRN Wheezing      Vital Signs Last 24 Hrs  T(C): 37.1 (2018 10:08), Max: 37.1 (2018 10:08)  T(F): 98.7 (2018 10:08), Max: 98.7 (2018 10:08)  HR: 82 (2018 10:08) (80 - 85)  BP: 161/81 (2018 10:09) (122/72 - 184/77)  BP(mean): --  RR: 18 (2018 10:08) (17 - 18)  SpO2: 98% (2018 10:08) (94% - 98%)  CAPILLARY BLOOD GLUCOSE        I&O's Summary    2018 07:01  -  2018 07:00  --------------------------------------------------------  IN: 2300 mL / OUT: 1950 mL / NET: 350 mL        PHYSICAL EXAM:  GENERAL: NAD  HEAD:  Atraumatic, Normocephalic  EYES: EOMI, PERRLA, conjunctiva and sclera clear  NECK: Supple, No JVD  CHEST/LUNG: Clear to auscultation bilaterally; No wheeze  HEART: Regular rate and rhythm; No murmurs, rubs, or gallops  ABDOMEN: Soft, Nontender, Nondistended; Bowel sounds present  EXTREMITIES:  2+ Peripheral Pulses, No clubbing, cyanosis, or edema. L ankle in splint  NEUROLOGY: AAOx2, non-focal  PSYCH: calm  SKIN: No rashes or lesions    LABS:                        8.3    7.41  )-----------( 374      ( 2018 07:55 )             26.9     07-05    138  |  102  |  50<H>  ----------------------------<  87  4.8   |  21<L>  |  3.82<H>    Ca    9.2      2018 06:37  Phos  4.7     07-04  Mg     2.5     07-04    TPro  7.9  /  Alb  3.1<L>  /  TBili  0.2  /  DBili  x   /  AST  14  /  ALT  10  /  AlkPhos  87  07-04    PT/INR - ( 2018 19:07 )   PT: 11.3 sec;   INR: 1.05 ratio         PTT - ( 2018 19:07 )  PTT:31.8 sec      Urinalysis Basic - ( 2018 21:09 )    Color: PL Yellow / Appearance: SL Turbid / S.012 / pH: x  Gluc: x / Ketone: Negative  / Bili: Negative / Urobili: Negative   Blood: x / Protein: 100 mg/dL / Nitrite: Negative   Leuk Esterase: Large / RBC: 0-2 /HPF / WBC 6-10 /HPF   Sq Epi: x / Non Sq Epi: x / Bacteria: x          Case Discussed with Family: d/w  - plan for JONATHAN on discharge
Patient is a 83y old  Female who presents with a chief complaint of left knee fracture (05 Jul 2018 10:21)      SUBJECTIVE / OVERNIGHT EVENTS: Patient seen and examined at bedside - patient doing well, went to get a renal ultrasound, patient is confused but able to answer questions. Transport services were at the bedside to transport patient to [facility] when her BP was found to be 195/95, the patient denies pain, HA, blurry vision, or dizziness.    ROS:  All other review of systems negative    Allergies    No Known Allergies    Intolerances        MEDICATIONS  (STANDING):  amLODIPine   Tablet 5 milliGRAM(s) Oral daily  docusate sodium 100 milliGRAM(s) Oral three times a day  ferrous    sulfate 325 milliGRAM(s) Oral two times a day  heparin  Injectable 5000 Unit(s) SubCutaneous every 8 hours  metoprolol tartrate 50 milliGRAM(s) Oral two times a day  mirabegron ER 25 milliGRAM(s) Oral daily  mirtazapine 15 milliGRAM(s) Oral at bedtime  pantoprazole    Tablet 40 milliGRAM(s) Oral before breakfast  senna 2 Tablet(s) Oral at bedtime  simvastatin 20 milliGRAM(s) Oral at bedtime  sodium bicarbonate 650 milliGRAM(s) Oral two times a day    MEDICATIONS  (PRN):  acetaminophen   Tablet 650 milliGRAM(s) Oral every 6 hours PRN pain  ALBUTerol    90 MICROgram(s) HFA Inhaler 2 Puff(s) Inhalation every 6 hours PRN Wheezing      Vital Signs Last 24 Hrs  T(C): 36.4 (06 Jul 2018 11:16), Max: 36.8 (05 Jul 2018 19:39)  T(F): 97.5 (06 Jul 2018 11:16), Max: 98.2 (05 Jul 2018 19:39)  HR: 104 (06 Jul 2018 11:16) (86 - 104)  BP: 195/95 (06 Jul 2018 11:16) (150/88 - 195/95)  BP(mean): --  RR: 18 (06 Jul 2018 11:16) (18 - 20)  SpO2: 98% (06 Jul 2018 11:16) (97% - 98%)  CAPILLARY BLOOD GLUCOSE        I&O's Summary    05 Jul 2018 07:01  -  06 Jul 2018 07:00  --------------------------------------------------------  IN: 580 mL / OUT: 1400 mL / NET: -820 mL    06 Jul 2018 07:01  -  06 Jul 2018 12:07  --------------------------------------------------------  IN: 0 mL / OUT: 550 mL / NET: -550 mL        PHYSICAL EXAM:  GENERAL: NAD  HEAD:  Atraumatic, Normocephalic  EYES: EOMI, PERRLA, conjunctiva and sclera clear  NECK: Supple, No JVD  CHEST/LUNG: Clear to auscultation bilaterally; No wheeze  HEART: Regular rate and rhythm; No murmurs, rubs, or gallops  ABDOMEN: Soft, Nontender, Nondistended; Bowel sounds present  EXTREMITIES:  L ankle in splint  NEUROLOGY: AAOx2, non-focal  PSYCH: calm  SKIN: No rashes or lesions    LABS:                        8.3    7.41  )-----------( 374      ( 05 Jul 2018 07:55 )             26.9     07-05    138  |  102  |  50<H>  ----------------------------<  87  4.8   |  21<L>  |  3.82<H>    Ca    9.2      05 Jul 2018 06:37              Case Discussed with Family: d/w  at bedside    Goals of Care:
Patient is a 83y old  Female who presents with a chief complaint of left knee fracture (2018 21:48)      SUBJECTIVE / OVERNIGHT EVENTS: Denies pain, no cp, sob, chills, no n/v    MEDICATIONS  (STANDING):  ferrous    sulfate 325 milliGRAM(s) Oral two times a day  heparin  Injectable 5000 Unit(s) SubCutaneous every 8 hours  metoprolol tartrate 50 milliGRAM(s) Oral two times a day  mirabegron ER 25 milliGRAM(s) Oral daily  mirtazapine 15 milliGRAM(s) Oral at bedtime  pantoprazole    Tablet 40 milliGRAM(s) Oral before breakfast  simvastatin 20 milliGRAM(s) Oral at bedtime  sodium bicarbonate 650 milliGRAM(s) Oral two times a day  sodium chloride 0.9%. 1000 milliLiter(s) (75 mL/Hr) IV Continuous <Continuous>    MEDICATIONS  (PRN):  acetaminophen   Tablet 650 milliGRAM(s) Oral every 6 hours PRN pain  ALBUTerol    90 MICROgram(s) HFA Inhaler 2 Puff(s) Inhalation every 6 hours PRN Wheezing        CAPILLARY BLOOD GLUCOSE        I&O's Summary    2018 07:01  -  2018 07:00  --------------------------------------------------------  IN: 0 mL / OUT: 400 mL / NET: -400 mL    2018 07:  -  2018 15:49  --------------------------------------------------------  IN: 840 mL / OUT: 600 mL / NET: 240 mL        PHYSICAL EXAM:  GENERAL: NAD, frail   HEAD:  Atraumatic, Normocephalic  EYES: EOMI, PERRLA, conjunctiva and sclera clear  NECK: Supple, No JVD  CHEST/LUNG: Clear to auscultation bilaterally; No wheeze  HEART: Regular rate and rhythm; S1S2  ABDOMEN: Soft, Nontender, Nondistended; Bowel sounds present  EXTREMITIES:  left soft cast  PSYCH: AAOx3  NEUROLOGY: non-focal      LABS:                        8.1    7.72  )-----------( 361      ( 2018 08:22 )             27.9     07-04    139  |  101  |  49<H>  ----------------------------<  96  5.1   |  23  |  4.32<H>    Ca    9.2      2018 06:18  Phos  4.7     07-04  Mg     2.5     07-04    TPro  7.9  /  Alb  3.1<L>  /  TBili  0.2  /  DBili  x   /  AST  14  /  ALT  10  /  AlkPhos  87  07-04    PT/INR - ( 2018 19:07 )   PT: 11.3 sec;   INR: 1.05 ratio         PTT - ( 2018 19:07 )  PTT:31.8 sec      Urinalysis Basic - ( 2018 21:09 )    Color: PL Yellow / Appearance: SL Turbid / S.012 / pH: x  Gluc: x / Ketone: Negative  / Bili: Negative / Urobili: Negative   Blood: x / Protein: 100 mg/dL / Nitrite: Negative   Leuk Esterase: Large / RBC: 0-2 /HPF / WBC 6-10 /HPF   Sq Epi: x / Non Sq Epi: x / Bacteria: x        RADIOLOGY & ADDITIONAL TESTS:    Imaging Personally Reviewed:    Consultant(s) Notes Reviewed:      Care Discussed with Consultants/Other Providers:

## 2018-07-06 NOTE — PROGRESS NOTE ADULT - PROBLEM SELECTOR PLAN 3
likely colonization  c/w roque catheter  no need to treat with abx at this time
likely colonization  c/w roque catheter - chronic roque catheter  no need to treat with abx at this time  Unclear why patient is on myrbetric - will d/w Dr. Wilson
likely colonization  c/w roque catheter - chronic roque catheter  no need to treat with abx at this time  Unclear why patient is on myrbetric - will d/w Dr. Wilson

## 2018-07-06 NOTE — PROGRESS NOTE ADULT - PROBLEM SELECTOR PROBLEM 1
Ankle fracture, left, closed, initial encounter

## 2018-07-06 NOTE — PROGRESS NOTE ADULT - PROBLEM SELECTOR PLAN 1
nonweight bearing to LLE  s/p splint by ortho  pain control with tylenol   PT evaluation - will need JONATHAN
nonweight bearing to LLE  s/p splint by ortho - follow up with Dr. Roman  pain control with tylenol   PT evaluation - will need JONATHAN
nonweight bearing for now  s/p splint  appreciate ortho evaluation: no surgical intervention per ortho  pain control with tylenol   PT evaluation

## 2018-07-16 ENCOUNTER — APPOINTMENT (OUTPATIENT)
Dept: ORTHOPEDIC SURGERY | Facility: CLINIC | Age: 83
End: 2018-07-16

## 2018-09-23 ENCOUNTER — RX RENEWAL (OUTPATIENT)
Age: 83
End: 2018-09-23

## 2018-09-23 RX ORDER — MIRABEGRON 25 MG/1
25 TABLET, FILM COATED, EXTENDED RELEASE ORAL
Qty: 90 | Refills: 3 | Status: ACTIVE | COMMUNITY
Start: 2017-06-15 | End: 1900-01-01

## 2019-01-14 ENCOUNTER — RX RENEWAL (OUTPATIENT)
Age: 84
End: 2019-01-14

## 2019-04-03 ENCOUNTER — RX RENEWAL (OUTPATIENT)
Age: 84
End: 2019-04-03

## 2021-06-05 NOTE — DIETITIAN INITIAL EVALUATION ADULT. - PATIENT PROFILE REVIEWED
yes
Pt lives c spouse & children; is independent c ADL. Denies any V/C/D or chew/swallowing difficulty; nausea present PTA now resolved.  Pt reports decreased PO x 1 week PTA due to epigastric pain & fatigue; usually c good appetite. He has been dieting over last month (intermittent fasting; no eating after 8pm, watching portions; avoiding CHO); anxious to talk to RD once he's placed on therapeutic diet s/p procedure.

## 2022-01-11 NOTE — H&P ADULT - NEGATIVE GASTROINTESTINAL SYMPTOMS
no vomiting/no flatulence/no diarrhea/no change in bowel habits/no jaundice/no constipation/no melena/no hematochezia/no steatorrhea/no hiccoughs/no abdominal pain/no nausea Banner Transposition Flap Text: The defect edges were debeveled with a #15 scalpel blade.  Given the location of the defect and the proximity to free margins a Banner transposition flap was deemed most appropriate.  Using a sterile surgical marker, an appropriate flap drawn around the defect. The area thus outlined was incised deep to adipose tissue with a #15 scalpel blade.  The skin margins were undermined to an appropriate distance in all directions utilizing iris scissors.

## 2022-03-21 NOTE — ED ADULT NURSE NOTE - CAS TRG GENERAL AIRWAY, MLM
I spoke w/[facility] who states that will contact the pt today or tomorrow to schedule shipment.  I made the patient aware of status.   Patent

## 2022-08-22 NOTE — PROGRESS NOTE ADULT - PROBLEM/PLAN-4
Daily Note     Today's date: 2022  Patient name: Rakel Hodgson  : 1958  MRN: [id number]  Referring provider: DANIELA Liu  Dx:   Encounter Diagnosis     ICD-10-CM    1  Sciatic pain, right  M54 31                   Subjective: pt reports started taking "aciaticease" supplement which she was cleared by cardiac to take, pt reports only taking few days so doesn't notice any changes yet      Objective: See treatment diary below      Assessment:  Pt reports feeling a little better after today's session      Plan: Continue per plan of care  Progress treatment as tolerated    pt to contact MD office re: MRI results     Precautions: hx DVT, cardiac       Manuals 7/19/22 7/26  8/9/22 8/15/22 8/22/22        Gentle piriformis stretch  VK  CW VK VK VK                                               Neuro Re-Ed             Sciatic nerve glide  2x10 seated 30x seated  10x ea B/L 10x ea B/L 2x10 ea B/L        GS 3x10x5" 3x10x5"  30x5" 30x5" 30x5"        Mini bridge  2x10 no hold 2x10  3x10 no hold 3x10 3x10        Hip 3-way standing  np 15x ea B/L 2x10 ea B/L 2x10 ea B/L 2x10 ea B/L        TrA activation     20x5" 2x15x5"        BKFO    2x10 2x15        TrA march    2x10 2x15        Ther Ex             Nustep Gentle 5' no UE 8' gentle  5' 5' 10'        PB flexion 3-way   5x10" ea 5x10" 5x10" 10x10" ea                                                                                       Ther Activity                                       Gait Training                                       Modalities
DISPLAY PLAN FREE TEXT

## 2022-10-03 NOTE — ED PROVIDER NOTE - GASTROINTESTINAL, MLM
Abdomen soft, non-tender, no guarding. Abdomen soft, non-tender, no guarding. +hernia with no tenderness / discoloration 12-May-2022

## 2022-11-08 NOTE — ED PROVIDER NOTE - PSH
What Type Of Note Output Would You Prefer (Optional)?: Bullet Format Hpi Title: Evaluation of Skin Lesions How Severe Are Your Spot(S)?: mild Have Your Spot(S) Been Treated In The Past?: has not been treated No significant past surgical history

## 2023-01-23 NOTE — H&P ADULT. - PROBLEM/PLAN-6
verbalizes understanding/demonstrates understanding of teaching/good return demonstration/needs met DISPLAY PLAN FREE TEXT

## 2023-10-16 NOTE — PHYSICAL THERAPY INITIAL EVALUATION ADULT - FOLLOWS COMMANDS/ANSWERS QUESTIONS, REHAB EVAL
100% of the time Doxepin Pregnancy And Lactation Text: This medication is Pregnancy Category C and it isn't known if it is safe during pregnancy. It is also excreted in breast milk and breast feeding isn't recommended.

## 2024-04-02 NOTE — ED PROVIDER NOTE - SIGN-OUT TIME
[de-identified] : 42 female still with pain discomfort in the right wrist area.  She did have surgery for carpal tunnel release.  She has struggled with pain and discomfort.  She did get a cortisone injection to the carpal canal relatively recently.  Minimal changes.  She comes in for the evaluation today.  Still not working.  Still totally disabled.
03-Jul-2018 17:46

## 2024-10-02 NOTE — PATIENT PROFILE ADULT. - CAREGIVER NAME
Discussed 3 pillars of health/decreasing inflammation.  Diet, exercise, stress management.  Discussed avoiding processed foods/sugar.  Change in diet.  Decreasing alcohol intake.  Resume regular exercise.  Call with laboratory studies.  Call with questions or problems.  
Salbador Ramos
